# Patient Record
Sex: FEMALE | Race: BLACK OR AFRICAN AMERICAN | NOT HISPANIC OR LATINO | Employment: FULL TIME | URBAN - METROPOLITAN AREA
[De-identification: names, ages, dates, MRNs, and addresses within clinical notes are randomized per-mention and may not be internally consistent; named-entity substitution may affect disease eponyms.]

---

## 2017-02-17 ENCOUNTER — OFFICE VISIT (OUTPATIENT)
Dept: INTERNAL MEDICINE | Facility: CLINIC | Age: 57
End: 2017-02-17
Payer: COMMERCIAL

## 2017-02-17 ENCOUNTER — TELEPHONE (OUTPATIENT)
Dept: INTERNAL MEDICINE | Facility: CLINIC | Age: 57
End: 2017-02-17

## 2017-02-17 VITALS
BODY MASS INDEX: 28.87 KG/M2 | DIASTOLIC BLOOD PRESSURE: 86 MMHG | WEIGHT: 173.31 LBS | HEART RATE: 82 BPM | SYSTOLIC BLOOD PRESSURE: 132 MMHG | HEIGHT: 65 IN

## 2017-02-17 DIAGNOSIS — I10 ESSENTIAL HYPERTENSION: Primary | ICD-10-CM

## 2017-02-17 DIAGNOSIS — N95.1 MENOPAUSE SYNDROME: ICD-10-CM

## 2017-02-17 DIAGNOSIS — Z00.00 ANNUAL PHYSICAL EXAM: Primary | ICD-10-CM

## 2017-02-17 PROCEDURE — 99213 OFFICE O/P EST LOW 20 MIN: CPT | Mod: S$GLB,,, | Performed by: INTERNAL MEDICINE

## 2017-02-17 PROCEDURE — 99999 PR PBB SHADOW E&M-EST. PATIENT-LVL III: CPT | Mod: PBBFAC,,, | Performed by: INTERNAL MEDICINE

## 2017-02-17 NOTE — PROGRESS NOTES
Subjective:       Patient ID: Buffy Morin is a 56 y.o. female.    Chief Complaint: Follow-up    HPI Comments: Follow up    Blood pressure doing very well on 2.5 mg of amlodipine.  No side effects.  Denies edema.  Try to work on exercise, lifestyle modification and healthy eating habits.  Has lost a couple of pounds.    Status post hysterectomy.  Not on ERT.  Some hot flashes and night sweats.  Sister with breast cancer, concerned about this.  Discussed a GYN assessment.    Patient Active Problem List:     Anxiety     Carpal tunnel syndrome     Mild vitamin D deficiency     Mixed hyperlipidemia     Family history of breast cancer in sister     Essential hypertension     Non morbid obesity due to excess calories      Review of Systems   Constitutional: Negative for chills, fatigue and fever.   HENT: Positive for postnasal drip. Negative for congestion and ear pain.    Eyes: Negative.    Respiratory: Negative for cough, chest tightness, shortness of breath and wheezing.    Cardiovascular: Negative.    Gastrointestinal: Negative.    Genitourinary:        Menopause sx       Objective:      Physical Exam   Constitutional: She is oriented to person, place, and time. She appears well-developed and well-nourished.   HENT:   Head: Normocephalic and atraumatic.   Right Ear: External ear normal.   Left Ear: External ear normal.   Mouth/Throat: Oropharynx is clear and moist.   Eyes: Conjunctivae are normal.   Neck: Normal range of motion. Neck supple. No thyromegaly present.   Cardiovascular: Normal rate.    Pulmonary/Chest: No respiratory distress.   Abdominal: Soft.   Musculoskeletal: She exhibits no edema.   Lymphadenopathy:     She has no cervical adenopathy.   Neurological: She is alert and oriented to person, place, and time.   Skin: Skin is warm and dry. No rash noted. No erythema.       Assessment:       1. Essential hypertension    2. Menopause syndrome        Plan:         Essential hypertension: continue current  meds.  Low salt diet, exercise, 5-10-# weight loss.  Call if BP > 135/85 on a regular basis.    Menopause syndrome  -     Ambulatory consult to Gynecology    EPP 9/17

## 2017-03-21 DIAGNOSIS — I10 ESSENTIAL HYPERTENSION: ICD-10-CM

## 2017-03-21 RX ORDER — AMLODIPINE BESYLATE 2.5 MG/1
2.5 TABLET ORAL DAILY
Qty: 90 TABLET | Refills: 0 | Status: SHIPPED | OUTPATIENT
Start: 2017-03-21 | End: 2017-07-28 | Stop reason: SDUPTHER

## 2017-04-17 RX ORDER — MELOXICAM 15 MG/1
TABLET ORAL
Qty: 30 TABLET | Refills: 0 | Status: SHIPPED | OUTPATIENT
Start: 2017-04-17 | End: 2017-05-28 | Stop reason: SDUPTHER

## 2017-05-29 RX ORDER — MELOXICAM 15 MG/1
TABLET ORAL
Qty: 30 TABLET | Refills: 0 | Status: SHIPPED | OUTPATIENT
Start: 2017-05-29 | End: 2017-08-25 | Stop reason: SDUPTHER

## 2017-07-28 DIAGNOSIS — I10 ESSENTIAL HYPERTENSION: ICD-10-CM

## 2017-07-28 RX ORDER — AMLODIPINE BESYLATE 2.5 MG/1
2.5 TABLET ORAL DAILY
Qty: 90 TABLET | Refills: 0 | Status: SHIPPED | OUTPATIENT
Start: 2017-07-28 | End: 2017-09-22 | Stop reason: SDUPTHER

## 2017-08-25 RX ORDER — MELOXICAM 15 MG/1
TABLET ORAL
Qty: 30 TABLET | Refills: 0 | Status: SHIPPED | OUTPATIENT
Start: 2017-08-25 | End: 2018-03-23

## 2017-09-22 ENCOUNTER — TELEPHONE (OUTPATIENT)
Dept: INTERNAL MEDICINE | Facility: CLINIC | Age: 57
End: 2017-09-22

## 2017-09-22 ENCOUNTER — LAB VISIT (OUTPATIENT)
Dept: LAB | Facility: HOSPITAL | Age: 57
End: 2017-09-22
Attending: INTERNAL MEDICINE
Payer: COMMERCIAL

## 2017-09-22 ENCOUNTER — OFFICE VISIT (OUTPATIENT)
Dept: INTERNAL MEDICINE | Facility: CLINIC | Age: 57
End: 2017-09-22
Payer: COMMERCIAL

## 2017-09-22 ENCOUNTER — NURSE TRIAGE (OUTPATIENT)
Dept: ADMINISTRATIVE | Facility: CLINIC | Age: 57
End: 2017-09-22

## 2017-09-22 VITALS
WEIGHT: 166.25 LBS | DIASTOLIC BLOOD PRESSURE: 80 MMHG | BODY MASS INDEX: 28.38 KG/M2 | HEART RATE: 88 BPM | HEIGHT: 64 IN | OXYGEN SATURATION: 99 % | SYSTOLIC BLOOD PRESSURE: 142 MMHG

## 2017-09-22 DIAGNOSIS — Z90.710 S/P HYSTERECTOMY: ICD-10-CM

## 2017-09-22 DIAGNOSIS — R07.89 ATYPICAL CHEST PAIN: ICD-10-CM

## 2017-09-22 DIAGNOSIS — Z12.31 OTHER SCREENING MAMMOGRAM: ICD-10-CM

## 2017-09-22 DIAGNOSIS — R42 LIGHTHEADED: ICD-10-CM

## 2017-09-22 DIAGNOSIS — I10 ESSENTIAL HYPERTENSION: Primary | ICD-10-CM

## 2017-09-22 DIAGNOSIS — E55.9 MILD VITAMIN D DEFICIENCY: ICD-10-CM

## 2017-09-22 DIAGNOSIS — I10 ESSENTIAL HYPERTENSION: ICD-10-CM

## 2017-09-22 LAB
25(OH)D3+25(OH)D2 SERPL-MCNC: 26 NG/ML
ALBUMIN SERPL BCP-MCNC: 4.1 G/DL
ALP SERPL-CCNC: 111 U/L
ALT SERPL W/O P-5'-P-CCNC: 15 U/L
ANION GAP SERPL CALC-SCNC: 9 MMOL/L
AST SERPL-CCNC: 25 U/L
BASOPHILS # BLD AUTO: 0.03 K/UL
BASOPHILS NFR BLD: 0.5 %
BILIRUB SERPL-MCNC: 0.2 MG/DL
BUN SERPL-MCNC: 10 MG/DL
CALCIUM SERPL-MCNC: 10.1 MG/DL
CHLORIDE SERPL-SCNC: 106 MMOL/L
CO2 SERPL-SCNC: 25 MMOL/L
CREAT SERPL-MCNC: 0.9 MG/DL
DIFFERENTIAL METHOD: ABNORMAL
EOSINOPHIL # BLD AUTO: 0.1 K/UL
EOSINOPHIL NFR BLD: 1.8 %
ERYTHROCYTE [DISTWIDTH] IN BLOOD BY AUTOMATED COUNT: 14.7 %
EST. GFR  (AFRICAN AMERICAN): >60 ML/MIN/1.73 M^2
EST. GFR  (NON AFRICAN AMERICAN): >60 ML/MIN/1.73 M^2
GLUCOSE SERPL-MCNC: 94 MG/DL
HCT VFR BLD AUTO: 39.8 %
HGB BLD-MCNC: 13.1 G/DL
LYMPHOCYTES # BLD AUTO: 2.8 K/UL
LYMPHOCYTES NFR BLD: 47 %
MCH RBC QN AUTO: 26.4 PG
MCHC RBC AUTO-ENTMCNC: 32.9 G/DL
MCV RBC AUTO: 80 FL
MONOCYTES # BLD AUTO: 0.2 K/UL
MONOCYTES NFR BLD: 3.8 %
NEUTROPHILS # BLD AUTO: 2.8 K/UL
NEUTROPHILS NFR BLD: 46.9 %
PLATELET # BLD AUTO: 289 K/UL
PMV BLD AUTO: 10.6 FL
POTASSIUM SERPL-SCNC: 4.5 MMOL/L
PROT SERPL-MCNC: 8.6 G/DL
RBC # BLD AUTO: 4.96 M/UL
SODIUM SERPL-SCNC: 140 MMOL/L
TSH SERPL DL<=0.005 MIU/L-ACNC: 1.96 UIU/ML
VIT B12 SERPL-MCNC: 774 PG/ML
WBC # BLD AUTO: 6 K/UL

## 2017-09-22 PROCEDURE — 3008F BODY MASS INDEX DOCD: CPT | Mod: S$GLB,,, | Performed by: INTERNAL MEDICINE

## 2017-09-22 PROCEDURE — 3077F SYST BP >= 140 MM HG: CPT | Mod: S$GLB,,, | Performed by: INTERNAL MEDICINE

## 2017-09-22 PROCEDURE — 99214 OFFICE O/P EST MOD 30 MIN: CPT | Mod: S$GLB,,, | Performed by: INTERNAL MEDICINE

## 2017-09-22 PROCEDURE — 82306 VITAMIN D 25 HYDROXY: CPT

## 2017-09-22 PROCEDURE — 36415 COLL VENOUS BLD VENIPUNCTURE: CPT

## 2017-09-22 PROCEDURE — 85025 COMPLETE CBC W/AUTO DIFF WBC: CPT

## 2017-09-22 PROCEDURE — 99999 PR PBB SHADOW E&M-EST. PATIENT-LVL IV: CPT | Mod: PBBFAC,,, | Performed by: INTERNAL MEDICINE

## 2017-09-22 PROCEDURE — 83036 HEMOGLOBIN GLYCOSYLATED A1C: CPT

## 2017-09-22 PROCEDURE — 82607 VITAMIN B-12: CPT

## 2017-09-22 PROCEDURE — 3079F DIAST BP 80-89 MM HG: CPT | Mod: S$GLB,,, | Performed by: INTERNAL MEDICINE

## 2017-09-22 PROCEDURE — 84443 ASSAY THYROID STIM HORMONE: CPT

## 2017-09-22 PROCEDURE — 80053 COMPREHEN METABOLIC PANEL: CPT

## 2017-09-22 RX ORDER — AMLODIPINE BESYLATE 5 MG/1
5 TABLET ORAL DAILY
Qty: 90 TABLET | Refills: 2 | Status: SHIPPED | OUTPATIENT
Start: 2017-09-22 | End: 2018-06-16 | Stop reason: SDUPTHER

## 2017-09-22 NOTE — TELEPHONE ENCOUNTER
Spoke to tp and notified to keep apt if anyone cancel she will receive a call but as of right nor Dr. Carrasquillo is really booked  Today   Pt voiced understanding

## 2017-09-22 NOTE — TELEPHONE ENCOUNTER
"Call transferred from . Lightheaded. 163/98 HR= ?    Reason for Disposition   BP  >= 180/110    Answer Assessment - Initial Assessment Questions  1. BLOOD PRESSURE: "What is the blood pressure?" "Did you take at least two measurements 5 minutes apart?"     Lightheaded from last fri.   2. ONSET: "When did you take your blood pressure?"    11am   3. HOW: "How did you obtain the blood pressure?" (e.g., visiting nurse, automatic home BP monitor)     Home cuff   4. HISTORY: "Do you have a history of high blood pressure?"     Yes   5. MEDICATIONS: "Are you taking any medications for blood pressure?" "Have you missed any doses recently?"    Missed BP med one day last thurs   6. OTHER SYMPTOMS: "Do you have any symptoms?" (e.g., headache, chest pain, blurred vision, difficulty breathing, weakness)    appt for 4 pm. Denies stroke sx    Protocols used: ST HIGH BLOOD PRESSURE-A-AH  trying to decrease sodium intake since fri. Eating and drinking normally. Urgent message sent to office re above. pt requesting appt moved up today.  Call back with questions.     "

## 2017-09-22 NOTE — PROGRESS NOTES
Subjective:       Patient ID: Buffy Morin is a 56 y.o. female.    Chief Complaint: Follow-up    Follow up HTN    Overall ok but in the past week has felt lightheaded a couple of times.  She may have missed her BP meds once.   Last week when she missed her meds she felt lightheaded, not vertigo.   No syncope or chest pain with this; no pressure, tightness or SOB.  No headache, blurry vision, slurring or weakness.  Last week BP was 161 systolic.    She ws sitting doing nothing when this happened.    Also today felt this as well; today /98 at 10:50 am.  She had taken her meds today at 5 am.  No other sx aside from a little lightheaded. Had been sitting doing nothing.  Ate as usual.  No stress or OTC meds.    For the most part BP has been doing pretty well but she does not check often.    No intercurrent illness. No URI or GI sx.  No fever, chills, sweats or joint sx, no rash.    Recall hysterectomy years ago.    Some DJD and cannot do any high impact exercise.    Patient Active Problem List:     Anxiety     Carpal tunnel syndrome     Mild vitamin D deficiency     Mixed hyperlipidemia     Family history of breast cancer in sister     Essential hypertension                Review of Systems   Constitutional: Negative for activity change, appetite change, chills, fatigue and fever.        Trying to eat a healthy low salt diet; slow deliberate weight loss   HENT: Negative for congestion, hearing loss, sinus pressure and sore throat.    Eyes: Negative for visual disturbance.   Respiratory: Negative for cough, shortness of breath and wheezing.    Cardiovascular: Negative for chest pain, palpitations and leg swelling.        Once or twice has had some vague chest sx, only at rest, never with exertion  No jaw or arm sx  No nausea or diaphoresis  Does exercise on a bike without sx   Gastrointestinal: Negative for abdominal distention, abdominal pain, constipation, diarrhea, nausea and vomiting.   Endocrine: Negative  for cold intolerance, heat intolerance, polydipsia, polyphagia and polyuria.   Genitourinary: Negative for dysuria, frequency, hematuria and vaginal bleeding.   Musculoskeletal: Positive for arthralgias. Negative for gait problem, joint swelling and myalgias.   Skin: Negative for rash.   Neurological: Negative for weakness, light-headedness and headaches.        See above   Hematological: Negative for adenopathy. Does not bruise/bleed easily.   Psychiatric/Behavioral: Negative for confusion, hallucinations, sleep disturbance and suicidal ideas.        Denies stress       Objective:      Physical Exam   Constitutional: She is oriented to person, place, and time. She appears well-developed and well-nourished.   HENT:   Head: Normocephalic and atraumatic.   Right Ear: External ear normal.   Left Ear: External ear normal.   Nose: Nose normal.   Mouth/Throat: Oropharynx is clear and moist. No oropharyngeal exudate.   Eyes: Conjunctivae and EOM are normal. Pupils are equal, round, and reactive to light. No scleral icterus.   Neck: Normal range of motion. Neck supple. No JVD present. No thyromegaly present.   Cardiovascular: Normal rate, regular rhythm, normal heart sounds and intact distal pulses.  Exam reveals no gallop.    No murmur heard.  Pulmonary/Chest: Effort normal and breath sounds normal. No respiratory distress. She has no wheezes.   Abdominal: Soft. Bowel sounds are normal. She exhibits no distension and no mass. There is no tenderness. There is no rebound and no guarding.   Musculoskeletal: Normal range of motion. She exhibits no edema or tenderness.   Lymphadenopathy:     She has no cervical adenopathy.   Neurological: She is alert and oriented to person, place, and time. She displays normal reflexes. No cranial nerve deficit. Coordination normal.   Skin: Skin is warm. No rash noted. No erythema.   Psychiatric: She has a normal mood and affect. Her behavior is normal. Judgment and thought content normal.    Nursing note and vitals reviewed.      Assessment:       1. Essential hypertension    2. Other screening mammogram    3. Mild vitamin D deficiency    4. Lightheaded    5. Atypical chest pain        Plan:         Essential hypertension: I rechecked both arms and got 144/80 range.  Increase amlodipine to 5.  No smartphone se declines digital program, pa.  Low salt diet, exercise, 5-10-# weight loss.  Call if BP > 135/85 on a regular basis.  RTC 6 weeks for BP check and reassessment, sooner with problems  -     CBC auto differential; Future; Expected date: 09/22/2017  -     Comprehensive metabolic panel; Future; Expected date: 09/22/2017  -     Cardiac treadmill stress test; Future  -     amlodipine (NORVASC) 5 MG tablet; Take 1 tablet (5 mg total) by mouth once daily.  Dispense: 90 tablet; Refill: 2  -     Urinalysis; Future; Expected date: 09/22/2017    Other screening mammogram  -     Mammo Digital Screening Bilat with CAD; Future; Expected date: 09/22/2017    Mild vitamin D deficiency  -     Vitamin D; Future; Expected date: 09/22/2017    Lightheaded  -     TSH; Future; Expected date: 09/22/2017  -     Hemoglobin A1c; Future; Expected date: 09/22/2017  -     Vitamin B12; Future; Expected date: 09/22/2017  -     Cardiac treadmill stress test; Future    Atypical chest pain  -     Cardiac treadmill stress test; Future    Flu vaccine through work  Tdap recommended  I will review all studies and determine further tx depending on findings

## 2017-09-22 NOTE — PATIENT INSTRUCTIONS
Taking Your Blood Pressure  Blood pressure is the force of blood against the artery wall as it moves from the heart through the blood vessels. You can take your own blood pressure reading using a digital monitor. Take your readings the same each time, using the same arm. Take readings as often as your healthcare provider instructs.  About blood pressure monitors  Blood pressure monitors are designed for certain ages and cases. You can find monitors for older adults, for pregnant women, and for children. Make sure the one you choose is the right one for your age and situation.  The American Heart Association recommends an automatic cuff monitor that fits on your upper arm (bicep). The cuff should fit your arm size. A cuff thats too large or too small will not give an accurate reading. Measure around your upper arm to find your size.  Monitors that attach to your finger or wrist are not as accurate as monitors for your upper arm.  Ask your healthcare provider for help in choosing a monitor. Bring your monitor to your next provider visit if you need help in using it the correct way.  The steps below are general instructions for using an automatic digital monitor.  Step 1. Relax    · Take your blood pressure at the same time every day, such as in the morning or evening, or at the time your healthcare provider recommends.  · Wait at least a half-hour after smoking, eating, or exercising. Don't drink coffee, tea, soda, or other caffeinated beverages before checking your blood pressure.  · Sit comfortably at a table with both feet on the floor. Do not cross your legs or feet. Place the monitor near you.  · Rest for a few minutes before you begin.  Step 2. Wrap the cuff    · Place your arm on the table, palm up. Your arm should be at the level of your heart. Wrap the cuff around your upper arm, just above your elbow. Its best done on bare skin, not over clothing. Most cuffs will indicate where the brachial artery (the  blood vessel in the middle of the arm at the inner side of the elbow) should line up with the cuff. Look in your monitor's instruction booklet for an illustration. You can also bring your cuff to your healthcare provider and have them show you how to correctly place the cuff.  Step 3. Inflate the cuff    · Push the button that starts the pump.  · The cuff will tighten, then loosen.  · The numbers will change. When they stop changing, your blood pressure reading will appear.  · Take 2 or 3 readings one minute apart.  Step 4. Write down the results of each reading    · Write down your blood pressure numbers for each reading. Note the date and time. Keep your results in one place, such as a notebook. Even if your monitor has a built-in memory, keep a hard copy of the readings.  · Remove the cuff from your arm. Turn off the machine.  · Bring your blood pressure records with your healthcare providers at each visit.  · If you start a new blood pressure medicine, note the day you started the new medicine. Also note the day if you change the dose of your medicine. This information goes on your blood pressure recording sheet. This will help your healthcare provider monitor how well the medicine changes are working.  · Ask your healthcare provider what numbers should prompt you to call him or her. Also ask what numbers should prompt you to get help right away.  Date Last Reviewed: 11/1/2016  © 9770-2719 [employer]. [address] [place] [address], [place] [address]. All rights reserved. This information is not intended as a substitute for professional medical care. Always follow your healthcare professional's instructions.

## 2017-09-22 NOTE — TELEPHONE ENCOUNTER
----- Message from Barbara Dewey sent at 9/22/2017 10:54 AM CDT -----  Contact: self/[phone]  Patient called in regards needing to talk with Dr Carrasquillo about if she can be seen earlier than 4 o'clock, patient stated that her blood pressure is 163/98 she feels light headed (transfered call to a nurse on call). Please call and advise.       Thank you!!!

## 2017-09-23 PROBLEM — R42 LIGHTHEADED: Status: RESOLVED | Noted: 2017-09-22 | Resolved: 2017-09-23

## 2017-09-23 PROBLEM — Z90.710 S/P HYSTERECTOMY: Status: ACTIVE | Noted: 2017-09-23

## 2017-09-23 LAB
ESTIMATED AVG GLUCOSE: 128 MG/DL
HBA1C MFR BLD HPLC: 6.1 %

## 2017-09-25 ENCOUNTER — TELEPHONE (OUTPATIENT)
Dept: INTERNAL MEDICINE | Facility: CLINIC | Age: 57
End: 2017-09-25

## 2017-09-25 NOTE — TELEPHONE ENCOUNTER
----- Message from Cain Lang MA sent at 9/25/2017 10:39 AM CDT -----  Contact: self - [phone]  Patient is requesting to speak with Dr Carrasquillo today regarding her BP readings. Please call. Thanks!     This morning = 166/94 Pulse 86  9/23/17 142/87 Pulse 80   9/24/17 135/82 Pulse 78

## 2017-09-26 NOTE — TELEPHONE ENCOUNTER
----- Message from Jada Milian sent at 9/26/2017  9:19 AM CDT -----  Contact: Patient [phone]  Returned Call    Who left the message: Estefania Saucedo MA     When did the practice call: 09/25/2017 1:06pm    Please advise.    Thank You

## 2017-09-27 ENCOUNTER — HOSPITAL ENCOUNTER (OUTPATIENT)
Dept: RADIOLOGY | Facility: HOSPITAL | Age: 57
Discharge: HOME | End: 2017-09-27
Attending: INTERNAL MEDICINE
Payer: COMMERCIAL

## 2017-09-27 ENCOUNTER — HOSPITAL ENCOUNTER (OUTPATIENT)
Dept: CARDIOLOGY | Facility: CLINIC | Age: 57
Discharge: HOME | End: 2017-09-27
Payer: COMMERCIAL

## 2017-09-27 VITALS — BODY MASS INDEX: 28.34 KG/M2 | HEIGHT: 64 IN | WEIGHT: 166 LBS

## 2017-09-27 DIAGNOSIS — R07.89 ATYPICAL CHEST PAIN: ICD-10-CM

## 2017-09-27 DIAGNOSIS — Z12.31 OTHER SCREENING MAMMOGRAM: ICD-10-CM

## 2017-09-27 DIAGNOSIS — R42 LIGHTHEADED: ICD-10-CM

## 2017-09-27 DIAGNOSIS — I10 ESSENTIAL HYPERTENSION: ICD-10-CM

## 2017-09-27 LAB — DIASTOLIC DYSFUNCTION: NO

## 2017-09-27 PROCEDURE — 77067 SCR MAMMO BI INCL CAD: CPT | Mod: 26,,, | Performed by: RADIOLOGY

## 2017-09-27 PROCEDURE — 77067 SCR MAMMO BI INCL CAD: CPT | Mod: TC

## 2017-09-27 PROCEDURE — 93015 CV STRESS TEST SUPVJ I&R: CPT | Mod: S$GLB,,, | Performed by: INTERNAL MEDICINE

## 2017-09-30 ENCOUNTER — TELEPHONE (OUTPATIENT)
Dept: INTERNAL MEDICINE | Facility: CLINIC | Age: 57
End: 2017-09-30

## 2017-09-30 DIAGNOSIS — E55.9 MILD VITAMIN D DEFICIENCY: Primary | ICD-10-CM

## 2017-09-30 NOTE — TELEPHONE ENCOUNTER
Please let her know stress test looks good    She has low vitamin D.  She should also take vitamin D 2000 IU daily, recheck in 4 months- she should have an EPP with me in Feb 2018    She needs a follow up in 6 weeks with Janae for a BP check- please schedule both thanks

## 2017-10-03 NOTE — TELEPHONE ENCOUNTER
Spoke to pt and advised. Scheduled 6 week f/u appt for 11/14/17 with Janae. Pt placed on recall list for 2/2018 annual.

## 2017-10-24 DIAGNOSIS — I10 ESSENTIAL HYPERTENSION: ICD-10-CM

## 2017-10-24 RX ORDER — AMLODIPINE BESYLATE 2.5 MG/1
2.5 TABLET ORAL DAILY
Qty: 90 TABLET | Refills: 0 | Status: SHIPPED | OUTPATIENT
Start: 2017-10-24 | End: 2018-02-22

## 2017-11-14 ENCOUNTER — OFFICE VISIT (OUTPATIENT)
Dept: INTERNAL MEDICINE | Facility: CLINIC | Age: 57
End: 2017-11-14
Payer: COMMERCIAL

## 2017-11-14 VITALS
HEART RATE: 82 BPM | WEIGHT: 166 LBS | BODY MASS INDEX: 28.34 KG/M2 | DIASTOLIC BLOOD PRESSURE: 86 MMHG | OXYGEN SATURATION: 99 % | SYSTOLIC BLOOD PRESSURE: 138 MMHG | HEIGHT: 64 IN

## 2017-11-14 DIAGNOSIS — I10 ESSENTIAL HYPERTENSION: Primary | ICD-10-CM

## 2017-11-14 PROCEDURE — 99999 PR PBB SHADOW E&M-EST. PATIENT-LVL III: CPT | Mod: PBBFAC,,, | Performed by: HOSPITALIST

## 2017-11-14 PROCEDURE — 99213 OFFICE O/P EST LOW 20 MIN: CPT | Mod: S$GLB,,, | Performed by: HOSPITALIST

## 2017-11-14 NOTE — PROGRESS NOTES
[facility]  CLINIC NOTE    Patient Name: Buffy Morin  YOB: 1960    PRESENTING HISTORY       History of Present Illness:  Ms. Buffy Morin is a 57 y.o. female w/ significant PMHx of HTN presenting for a BP check up. Since her last visit she did not have any lightheadedness or chest pain. Denies having any headache or blurry vision. She has been compliant with her medications and denies having any side effects. Currently doing well and does not have any new complains     Review of Systems:  Constitutional: no fever or chills  Eyes: no visual changes  ENT: no nasal congestion or sore throat  Respiratory: no cough or shortness of breath  Cardiovascular: no chest pain or palpitations  Gastrointestinal: no nausea or vomiting, no abdominal pain or change in bowel habits  Genitourinary: no hematuria or dysuria  Musculoskeletal: no arthralgias or myalgias  Neurological: no seizures or tremors    PAST HISTORY:     Past Medical History:   Diagnosis Date    Abnormal Pap smear of cervix     Anxiety     Carpal tunnel syndrome     Elevated blood pressure     Family history of breast cancer in sister 10/1/2013    S/P hysterectomy 9/23/2017       Past Surgical History:   Procedure Laterality Date    BREAST BIOPSY Right     2007    HYSTERECTOMY      TUBAL LIGATION         Family History   Problem Relation Age of Onset    Cancer Mother      unsure    Hypertension Father     Cancer Sister 60     breast    Thyroid disease Sister     Breast cancer Sister        Social History     Social History    Marital status:      Spouse name: N/A    Number of children: N/A    Years of education: N/A     Social History Main Topics    Smoking status: Never Smoker    Smokeless tobacco: Never Used    Alcohol use Yes      Comment: rare    Drug use: No    Sexual activity: Not Asked     Other Topics Concern    None     Social History Narrative    None       MEDICATIONS &  "ALLERGIES:     Current Outpatient Prescriptions on File Prior to Visit   Medication Sig    amLODIPine (NORVASC) 2.5 MG tablet TAKE 1 TABLET (2.5 MG TOTAL) BY MOUTH ONCE DAILY.    amlodipine (NORVASC) 5 MG tablet Take 1 tablet (5 mg total) by mouth once daily.    cholecalciferol, vitamin D3, (VITAMIN D) 2,000 unit Cap Take 1 capsule by mouth Daily. Take vitamin D 2000 units every day.    meloxicam (MOBIC) 15 MG tablet TAKE 1 TABLET BY MOUTH EVERY DAY    mupirocin (BACTROBAN) 2 % ointment Apply topically 2 (two) times daily.    omega-3 fatty acids-vitamin E (FISH OIL) 1,000 mg Cap Take 1 capsule by mouth Three times a day.     No current facility-administered medications on file prior to visit.        Review of patient's allergies indicates:   Allergen Reactions    No known drug allergies        OBJECTIVE:   Vital Signs:  Vitals:    11/14/17 1520 11/14/17 1535   BP: (!) 134/94 138/86   Pulse: 82    SpO2: 99%    Weight: 75.3 kg (166 lb 0.1 oz)    Height: 5' 4" (1.626 m)        No results found for this or any previous visit (from the past 24 hour(s)).      Physical Exam:   General:  Well developed, well nourished, no acute distress  HEENT:  Normocephalic, atraumatic, PERRL, EOMI, clear sclera, ears normal, throat clear without erythema or exudates  CVS:  RRR, S1 and S2 normal, no murmurs, rubs, gallops  Resp:  Lungs clear to auscultation, no wheezes, rales, rhonchi, cough  GI:  Abdomen soft, non-tender, non-distended, normoactive bowel sounds, no masses  MSK:  No muscle atrophy, cyanosis, peripheral edema, full range of motion  Skin:  No rashes, ulcers, erythema  Neuro:  CNII-XII grossly intact  Psych:  Alert and oriented to person, place, and time    ASSESSMENT & PLAN:     Buffy was seen today for hypertension.    Diagnoses and all orders for this visit:    Essential hypertension  Continue current regiments for meds, diet and exercise  Continue weight loss     Follow up with Dr. Carrasquillo as scheduled     RTC PRN "             Patrick Kumari MD         Internal Medicine PGY-3                  # [phone]

## 2017-11-15 NOTE — PROGRESS NOTES
I have reviewed the notes, assessments, and/or procedures performed by Dr. Kumari, I concur with his documentation of Buffy Morin.

## 2017-11-18 ENCOUNTER — HOSPITAL ENCOUNTER (EMERGENCY)
Facility: HOSPITAL | Age: 57
Discharge: HOME | End: 2017-11-18
Attending: EMERGENCY MEDICINE
Payer: COMMERCIAL

## 2017-11-18 VITALS
RESPIRATION RATE: 20 BRPM | BODY MASS INDEX: 28.17 KG/M2 | HEIGHT: 64 IN | SYSTOLIC BLOOD PRESSURE: 132 MMHG | HEART RATE: 83 BPM | DIASTOLIC BLOOD PRESSURE: 77 MMHG | OXYGEN SATURATION: 98 % | TEMPERATURE: 99 F | WEIGHT: 165 LBS

## 2017-11-18 DIAGNOSIS — I10 ESSENTIAL HYPERTENSION: ICD-10-CM

## 2017-11-18 DIAGNOSIS — J02.0 STREP PHARYNGITIS: Primary | ICD-10-CM

## 2017-11-18 LAB — DEPRECATED S PYO AG THROAT QL EIA: NEGATIVE

## 2017-11-18 PROCEDURE — 96372 THER/PROPH/DIAG INJ SC/IM: CPT

## 2017-11-18 PROCEDURE — 87081 CULTURE SCREEN ONLY: CPT

## 2017-11-18 PROCEDURE — 63600175 PHARM REV CODE 636 W HCPCS: Performed by: EMERGENCY MEDICINE

## 2017-11-18 PROCEDURE — 99284 EMERGENCY DEPT VISIT MOD MDM: CPT | Mod: 25

## 2017-11-18 PROCEDURE — 87880 STREP A ASSAY W/OPTIC: CPT

## 2017-11-18 RX ORDER — KETOROLAC TROMETHAMINE 30 MG/ML
30 INJECTION, SOLUTION INTRAMUSCULAR; INTRAVENOUS
Status: COMPLETED | OUTPATIENT
Start: 2017-11-18 | End: 2017-11-18

## 2017-11-18 RX ORDER — LIDOCAINE HYDROCHLORIDE 20 MG/ML
SOLUTION OROPHARYNGEAL
Qty: 100 ML | Refills: 0 | Status: SHIPPED | OUTPATIENT
Start: 2017-11-18 | End: 2017-11-20

## 2017-11-18 RX ADMIN — PENICILLIN G BENZATHINE 1.2 MILLION UNITS: 1200000 INJECTION, SUSPENSION INTRAMUSCULAR at 08:11

## 2017-11-18 RX ADMIN — KETOROLAC TROMETHAMINE 30 MG: 30 INJECTION, SOLUTION INTRAMUSCULAR at 08:11

## 2017-11-19 NOTE — ED NOTES
Pt reports sore throat that began this morning, pt denies chest pain or sob, pt denies fever, denies n/v/d, pt awake alert in no acute distress, redness and swelling noted to tonsils

## 2017-11-19 NOTE — ED PROVIDER NOTES
"Encounter Date: 11/18/2017       History     Chief Complaint   Patient presents with    Sore Throat     pt to triage ambulatory and reports 1 day of sore throat and painful to swallow and saw white on back of throat; no fever reported     56 y/o F with sore throat since this am.  + pain with swallowing.  No difficulty swallowing.  No fever/chills.  Pt reports "I shined a light back there and I saw the white patches on the back of my throat.  I have had this before and I needed anti-biotics."  Pt has taken halls lozenges, chloraseptic spray, salt water gargle, and tylenol for symptom relief with no improvement.  No relieving factors.  + swollen lymph node x 1 noted by patient.  No cough, sob, wheezing.  No abd pain. No known sick contacts          Review of patient's allergies indicates:   Allergen Reactions    No known drug allergies      Past Medical History:   Diagnosis Date    Abnormal Pap smear of cervix     Anxiety     Carpal tunnel syndrome     Elevated blood pressure     Family history of breast cancer in sister 10/1/2013    Hypertension     S/P hysterectomy 9/23/2017     Past Surgical History:   Procedure Laterality Date    BREAST BIOPSY Right     2007    HYSTERECTOMY      TUBAL LIGATION       Family History   Problem Relation Age of Onset    Cancer Mother      unsure    Hypertension Father     Cancer Sister 60     breast    Thyroid disease Sister     Breast cancer Sister      Social History   Substance Use Topics    Smoking status: Never Smoker    Smokeless tobacco: Never Used    Alcohol use Yes      Comment: rare     Review of Systems   Constitutional: Positive for appetite change. Negative for chills, diaphoresis, fatigue and fever.   HENT: Positive for sore throat and voice change. Negative for congestion.    Eyes: Negative for pain, discharge and redness.   Respiratory: Negative for cough, chest tightness and shortness of breath.    Gastrointestinal: Negative for nausea and vomiting. " "  Endocrine: Negative for polydipsia and polyphagia.   Genitourinary: Negative for difficulty urinating, dysuria and flank pain.   Musculoskeletal: Negative for neck pain and neck stiffness.   Skin: Negative for pallor and rash.   Allergic/Immunologic: Negative for immunocompromised state.   Neurological: Negative for dizziness, weakness and headaches.   Hematological: Negative.    All other systems reviewed and are negative.      Physical Exam     Initial Vitals [11/18/17 1946]   BP Pulse Resp Temp SpO2   (!) 191/101 95 20 98.3 °F (36.8 °C) 95 %      MAP       131         Physical Exam    Nursing note and vitals reviewed.  Constitutional: She appears well-developed and well-nourished. She is not diaphoretic. No distress.   HENT:   Head: Normocephalic and atraumatic.   Mouth/Throat: Uvula is midline and mucous membranes are normal. Oropharyngeal exudate and posterior oropharyngeal erythema present. No posterior oropharyngeal edema.   Eyes: Conjunctivae and EOM are normal.   Neck: Normal range of motion. Neck supple.   Cardiovascular: Normal rate, regular rhythm and normal heart sounds. Exam reveals no gallop and no friction rub.    No murmur heard.  Pulmonary/Chest: Breath sounds normal. No respiratory distress. She has no wheezes. She has no rhonchi. She has no rales.   Abdominal: Soft. She exhibits no distension. There is no tenderness.   Musculoskeletal: Normal range of motion.   Lymphadenopathy:     She has cervical adenopathy.   Neurological: She is alert and oriented to person, place, and time.   Skin: Skin is warm and dry. No rash noted.   Psychiatric: She has a normal mood and affect. Her behavior is normal. Judgment normal.         ED Course   Procedures  Labs Reviewed   THROAT SCREEN, RAPID   CULTURE, STREP A,  THROAT             Medical Decision Making:   Initial Assessment:   58 y/o F with sore throat since this am.  + pain with swallowing.  No difficulty swallowing.  No fever/chills.  Pt reports "I " "shined a light back there and I saw the white patches on the back of my throat.  I have had this before and I needed anti-biotics."  Pt has taken halls lozenges, chloraseptic spray, salt water gargle, and tylenol for symptom relief with no improvement.  No relieving factors.  + swollen lymph node x 1 noted by patient.  No cough, sob, wheezing.  No abd pain. No known sick contacts          Differential Diagnosis:   DDX:  Strep pharyngitis, viral pharyngitis, URI  Clinical Tests:   Lab Tests: Ordered and Reviewed       <> Summary of Lab: Strep neg  ED Management:   Pt treated for strep with bicillin based on exam.  Pt given toradol IM for pain.  I will rx lidocaine gargle for symptomatic tx and have her f/u with pcp for re-evaluation and b/p check.      Pt counseled on their diagnosis and the importance of following up with PCP.  Pt also cautioned on when to return to ED.  Pt verbalizes understanding of discharge plan and will return to ED immediately if symptoms worsen                     ED Course      Clinical Impression:   The primary encounter diagnosis was Strep pharyngitis. A diagnosis of Essential hypertension was also pertinent to this visit.    Disposition:   Disposition: Discharged  Condition: Stable                        Katey Zuluaga MD  11/18/17 2052    "

## 2017-11-20 ENCOUNTER — OFFICE VISIT (OUTPATIENT)
Dept: OTOLARYNGOLOGY | Facility: CLINIC | Age: 57
End: 2017-11-20
Payer: COMMERCIAL

## 2017-11-20 ENCOUNTER — HOSPITAL ENCOUNTER (OUTPATIENT)
Facility: HOSPITAL | Age: 57
Discharge: HOME | End: 2017-11-21
Attending: OTOLARYNGOLOGY | Admitting: OTOLARYNGOLOGY
Payer: COMMERCIAL

## 2017-11-20 ENCOUNTER — OFFICE VISIT (OUTPATIENT)
Dept: INTERNAL MEDICINE | Facility: CLINIC | Age: 57
End: 2017-11-20
Payer: COMMERCIAL

## 2017-11-20 VITALS
WEIGHT: 165.56 LBS | HEART RATE: 116 BPM | DIASTOLIC BLOOD PRESSURE: 78 MMHG | SYSTOLIC BLOOD PRESSURE: 133 MMHG | BODY MASS INDEX: 28.42 KG/M2 | TEMPERATURE: 99 F

## 2017-11-20 VITALS
DIASTOLIC BLOOD PRESSURE: 84 MMHG | BODY MASS INDEX: 28.34 KG/M2 | OXYGEN SATURATION: 99 % | WEIGHT: 166 LBS | SYSTOLIC BLOOD PRESSURE: 134 MMHG | HEART RATE: 107 BPM | HEIGHT: 64 IN

## 2017-11-20 DIAGNOSIS — J02.9 SORE THROAT: ICD-10-CM

## 2017-11-20 DIAGNOSIS — R13.10 PAINFUL SWALLOWING: Primary | ICD-10-CM

## 2017-11-20 DIAGNOSIS — J36 PERITONSILLAR ABSCESS: ICD-10-CM

## 2017-11-20 PROCEDURE — G0379 DIRECT REFER HOSPITAL OBSERV: HCPCS

## 2017-11-20 PROCEDURE — 99999 PR PBB SHADOW E&M-EST. PATIENT-LVL IV: CPT | Mod: PBBFAC,,, | Performed by: INTERNAL MEDICINE

## 2017-11-20 PROCEDURE — 63600175 PHARM REV CODE 636 W HCPCS: Performed by: NURSE PRACTITIONER

## 2017-11-20 PROCEDURE — 99204 OFFICE O/P NEW MOD 45 MIN: CPT | Mod: 25,S$GLB,, | Performed by: OTOLARYNGOLOGY

## 2017-11-20 PROCEDURE — 25000003 PHARM REV CODE 250: Performed by: STUDENT IN AN ORGANIZED HEALTH CARE EDUCATION/TRAINING PROGRAM

## 2017-11-20 PROCEDURE — 99213 OFFICE O/P EST LOW 20 MIN: CPT | Mod: S$GLB,,, | Performed by: INTERNAL MEDICINE

## 2017-11-20 PROCEDURE — G0378 HOSPITAL OBSERVATION PER HR: HCPCS

## 2017-11-20 PROCEDURE — 99999 PR PBB SHADOW E&M-EST. PATIENT-LVL III: CPT | Mod: PBBFAC,,, | Performed by: OTOLARYNGOLOGY

## 2017-11-20 PROCEDURE — 25000003 PHARM REV CODE 250: Performed by: NURSE PRACTITIONER

## 2017-11-20 PROCEDURE — S0077 INJECTION, CLINDAMYCIN PHOSP: HCPCS | Performed by: NURSE PRACTITIONER

## 2017-11-20 PROCEDURE — 63600175 PHARM REV CODE 636 W HCPCS: Performed by: STUDENT IN AN ORGANIZED HEALTH CARE EDUCATION/TRAINING PROGRAM

## 2017-11-20 PROCEDURE — 42700 I&D ABSCESS PERITONSILLAR: CPT | Mod: S$GLB,,, | Performed by: OTOLARYNGOLOGY

## 2017-11-20 RX ORDER — SODIUM CHLORIDE 9 MG/ML
INJECTION, SOLUTION INTRAVENOUS CONTINUOUS
Status: DISCONTINUED | OUTPATIENT
Start: 2017-11-20 | End: 2017-11-21 | Stop reason: HOSPADM

## 2017-11-20 RX ORDER — DEXAMETHASONE SODIUM PHOSPHATE 100 MG/10ML
12 INJECTION INTRAMUSCULAR; INTRAVENOUS EVERY 8 HOURS
Status: CANCELLED | OUTPATIENT
Start: 2017-11-20 | End: 2017-11-21

## 2017-11-20 RX ORDER — DEXAMETHASONE SODIUM PHOSPHATE 4 MG/ML
12 INJECTION, SOLUTION INTRA-ARTICULAR; INTRALESIONAL; INTRAMUSCULAR; INTRAVENOUS; SOFT TISSUE EVERY 8 HOURS
Status: COMPLETED | OUTPATIENT
Start: 2017-11-20 | End: 2017-11-21

## 2017-11-20 RX ORDER — DEXAMETHASONE SODIUM PHOSPHATE 4 MG/ML
4 INJECTION, SOLUTION INTRA-ARTICULAR; INTRALESIONAL; INTRAMUSCULAR; INTRAVENOUS; SOFT TISSUE EVERY 6 HOURS
Status: DISCONTINUED | OUTPATIENT
Start: 2017-11-20 | End: 2017-11-20

## 2017-11-20 RX ORDER — CLINDAMYCIN PHOSPHATE 600 MG/50ML
600 INJECTION, SOLUTION INTRAVENOUS
Status: DISCONTINUED | OUTPATIENT
Start: 2017-11-20 | End: 2017-11-21 | Stop reason: HOSPADM

## 2017-11-20 RX ORDER — HYDROCODONE BITARTRATE AND ACETAMINOPHEN 7.5; 325 MG/15ML; MG/15ML
15 SOLUTION ORAL EVERY 4 HOURS PRN
Status: CANCELLED | OUTPATIENT
Start: 2017-11-20

## 2017-11-20 RX ORDER — DEXTROSE MONOHYDRATE, SODIUM CHLORIDE, AND POTASSIUM CHLORIDE 50; 1.49; 4.5 G/1000ML; G/1000ML; G/1000ML
INJECTION, SOLUTION INTRAVENOUS CONTINUOUS
Status: CANCELLED | OUTPATIENT
Start: 2017-11-20

## 2017-11-20 RX ORDER — CLINDAMYCIN PHOSPHATE 150 MG/ML
600 INJECTION, SOLUTION INTRAVENOUS
Status: DISCONTINUED | OUTPATIENT
Start: 2017-11-20 | End: 2017-11-20

## 2017-11-20 RX ORDER — OXYCODONE AND ACETAMINOPHEN 5; 325 MG/1; MG/1
1 TABLET ORAL EVERY 4 HOURS PRN
Status: DISCONTINUED | OUTPATIENT
Start: 2017-11-20 | End: 2017-11-21 | Stop reason: HOSPADM

## 2017-11-20 RX ORDER — MORPHINE SULFATE 4 MG/ML
4 INJECTION, SOLUTION INTRAMUSCULAR; INTRAVENOUS EVERY 4 HOURS PRN
Status: DISCONTINUED | OUTPATIENT
Start: 2017-11-20 | End: 2017-11-21 | Stop reason: HOSPADM

## 2017-11-20 RX ADMIN — SODIUM CHLORIDE: 0.9 INJECTION, SOLUTION INTRAVENOUS at 01:11

## 2017-11-20 RX ADMIN — MORPHINE SULFATE 4 MG: 4 INJECTION INTRAVENOUS at 01:11

## 2017-11-20 RX ADMIN — CLINDAMYCIN IN 5 PERCENT DEXTROSE 600 MG: 12 INJECTION, SOLUTION INTRAVENOUS at 09:11

## 2017-11-20 RX ADMIN — CLINDAMYCIN IN 5 PERCENT DEXTROSE 600 MG: 12 INJECTION, SOLUTION INTRAVENOUS at 01:11

## 2017-11-20 RX ADMIN — DEXAMETHASONE SODIUM PHOSPHATE 12 MG: 4 INJECTION, SOLUTION INTRAMUSCULAR; INTRAVENOUS at 01:11

## 2017-11-20 RX ADMIN — DEXAMETHASONE SODIUM PHOSPHATE 12 MG: 4 INJECTION, SOLUTION INTRAMUSCULAR; INTRAVENOUS at 09:11

## 2017-11-20 RX ADMIN — MORPHINE SULFATE 4 MG: 4 INJECTION INTRAVENOUS at 05:11

## 2017-11-20 NOTE — LETTER
November 20, 2017      Patrick Gibbs Jr., MD  [address]  [facility] [address]           [facility]  [address]  [facility] [address]  Phone: [phone]  Fax: [phone]          Patient: Buffy Morin   MR Number: [id number]   YOB: 1960   Date of Visit: 11/20/2017       Dear Dr. Patrick Gibbs Jr.:    Thank you for referring Buffy Morin to me for evaluation. Attached you will find relevant portions of my assessment and plan of care.    If you have questions, please do not hesitate to call me. I look forward to following Buffy Morin along with you.    Sincerely,    Champ Ho MD    Enclosure  CC:  No Recipients    If you would like to receive this communication electronically, please contact [email or web address] or [phone] to request more information on [employer] access.    For providers and/or their staff who would like to refer a patient to [facility], please contact us through our one-stop-shop provider referral line, [facility], at [phone].    If you feel you have received this communication in error or would no longer like to receive these types of communications, please e-mail [email or web address]

## 2017-11-20 NOTE — H&P
Chief Complaint   Patient presents with    consult/ severe sore throat, painful swallowing         HPI   57 y.o. female presents from Dr. Gibbs for L throat pain, odynophagia and dysphagia.  She reports that this problem began several days ago.  She was seen in the ED and treated with IM Bicillin with little improvement.  She denies SOB.  She finds it very difficult to drink.      Review of Systems   Constitutional: Negative for fatigue and unexpected weight change.   HENT: Per HPI.  Eyes: Negative for visual disturbance.   Respiratory: Negative for shortness of breath, hemoptysis   Cardiovascular: Negative for chest pain and palpitations.   Musculoskeletal: Negative for decreased ROM, back pain.   Skin: Negative for rash, sunburn, itching.   Neurological: Negative for dizziness and seizures.   Hematological: Negative for adenopathy. Does not bruise/bleed easily.   Endocrine: Negative for rapid weight loss/weight gain, heat/cold intolerance.      Past Medical History       Patient Active Problem List   Diagnosis    Anxiety    Carpal tunnel syndrome    Mild vitamin D deficiency    Mixed hyperlipidemia    Family history of breast cancer in sister    Essential hypertension    S/P hysterectomy    Peritonsillar abscess               Past Surgical History         Past Surgical History:   Procedure Laterality Date    BREAST BIOPSY Right       2007    HYSTERECTOMY        TUBAL LIGATION                Family History          Family History   Problem Relation Age of Onset    Cancer Mother         unsure    Hypertension Father      Cancer Sister 60       breast    Thyroid disease Sister      Breast cancer Sister                 Social History   .  Social History   Social History            Social History    Marital status:        Spouse name: N/A    Number of children: N/A    Years of education: N/A          Occupational History    Not on file.             Social History Main Topics    Smoking  status: Never Smoker    Smokeless tobacco: Never Used    Alcohol use Yes         Comment: rare    Drug use: No    Sexual activity: Not on file           Other Topics Concern    Not on file          Social History Narrative    No narrative on file               Allergies        Review of patient's allergies indicates:   Allergen Reactions    No known drug allergies                 Physical Exam          Vitals:     11/20/17 1058   BP: 133/78   Pulse: (!) 116   Temp: 99.4 °F (37.4 °C)            Body mass index is 28.42 kg/m².        General: AOx3, NAD   Respiratory:  Symmetric chest rise, normal effort  Right Ear: External Auditory Canal WNL,TM w/o masses/lesions/perforations.  Middle ear without evidence of effusion.   Left Ear: External Auditory Canal WNL,TM w/o masses/lesions/perforations.  Middle ear without evidence of effusion.   Nose: No gross nasal septal deviation. Inferior Turbinates WNL bilaterally. No septal perforation. No masses/lesions.   Oral Cavity:  Oral Tongue mobile, no lesions noted. Hard Palate WNL. No buccal or FOM lesions.  Oropharynx:  No masses/lesions of the posterior pharyngeal wall.  L tonsil medialized.  L soft palate edematous.  Pus noted on L tonsil.. Soft palate without masses. Midline uvula.   Neck: No scars.  No cervical lymphadenopathy, thyromegaly or thyroid nodules.  Normal range of motion.    Face: House Brackmann I bilaterally.      Topical and local anesthesia achieved in L tonsillar fossa.  Incision through superolateral L anterior tonsillar pillar carried out.  Abscess cavity bluntly opened with a hemostat to express scant pus.      Assessment/Plan      Problem List Items Addressed This Visit               ENT     Peritonsillar abscess     L PTA.  I and D performed.  Will admit for IVF given limited PO fluid intake, abx, pain control and steroids.

## 2017-11-20 NOTE — PROGRESS NOTES
Chief Complaint   Patient presents with    consult/ severe sore throat, painful swallowing       HPI   57 y.o. female presents from Dr. Gibbs for L throat pain, odynophagia and dysphagia.  She reports that this problem began several days ago.  She was seen in the ED and treated with IM Bicillin with little improvement.  She denies SOB.  She finds it very difficult to drink.     Review of Systems   Constitutional: Negative for fatigue and unexpected weight change.   HENT: Per HPI.  Eyes: Negative for visual disturbance.   Respiratory: Negative for shortness of breath, hemoptysis   Cardiovascular: Negative for chest pain and palpitations.   Musculoskeletal: Negative for decreased ROM, back pain.   Skin: Negative for rash, sunburn, itching.   Neurological: Negative for dizziness and seizures.   Hematological: Negative for adenopathy. Does not bruise/bleed easily.   Endocrine: Negative for rapid weight loss/weight gain, heat/cold intolerance.     Past Medical History   Patient Active Problem List   Diagnosis    Anxiety    Carpal tunnel syndrome    Mild vitamin D deficiency    Mixed hyperlipidemia    Family history of breast cancer in sister    Essential hypertension    S/P hysterectomy    Peritonsillar abscess           Past Surgical History   Past Surgical History:   Procedure Laterality Date    BREAST BIOPSY Right     2007    HYSTERECTOMY      TUBAL LIGATION           Family History   Family History   Problem Relation Age of Onset    Cancer Mother      unsure    Hypertension Father     Cancer Sister 60     breast    Thyroid disease Sister     Breast cancer Sister            Social History   .  Social History     Social History    Marital status:      Spouse name: N/A    Number of children: N/A    Years of education: N/A     Occupational History    Not on file.     Social History Main Topics    Smoking status: Never Smoker    Smokeless tobacco: Never Used    Alcohol use Yes      Comment:  rare    Drug use: No    Sexual activity: Not on file     Other Topics Concern    Not on file     Social History Narrative    No narrative on file         Allergies   Review of patient's allergies indicates:   Allergen Reactions    No known drug allergies            Physical Exam     Vitals:    11/20/17 1058   BP: 133/78   Pulse: (!) 116   Temp: 99.4 °F (37.4 °C)         Body mass index is 28.42 kg/m².      General: AOx3, NAD   Respiratory:  Symmetric chest rise, normal effort  Right Ear: External Auditory Canal WNL,TM w/o masses/lesions/perforations.  Middle ear without evidence of effusion.   Left Ear: External Auditory Canal WNL,TM w/o masses/lesions/perforations.  Middle ear without evidence of effusion.   Nose: No gross nasal septal deviation. Inferior Turbinates WNL bilaterally. No septal perforation. No masses/lesions.   Oral Cavity:  Oral Tongue mobile, no lesions noted. Hard Palate WNL. No buccal or FOM lesions.  Oropharynx:  No masses/lesions of the posterior pharyngeal wall.  L tonsil medialized.  L soft palate edematous.  Pus noted on L tonsil.. Soft palate without masses. Midline uvula.   Neck: No scars.  No cervical lymphadenopathy, thyromegaly or thyroid nodules.  Normal range of motion.    Face: House Brackmann I bilaterally.     Topical and local anesthesia achieved in L tonsillar fossa.  Incision through superolateral L anterior tonsillar pillar carried out.  Abscess cavity bluntly opened with a hemostat to express scant pus.     Assessment/Plan  Problem List Items Addressed This Visit        ENT    Peritonsillar abscess    L PTA.  I and D performed.  Will admit for IVF given limited PO fluid intake, abx, pain control and steroids.

## 2017-11-20 NOTE — PLAN OF CARE
Problem: Patient Care Overview  Goal: Plan of Care Review  Outcome: Ongoing (interventions implemented as appropriate)  Pt remained free from falls/injuries. VSS. C/o pain in throat. Right sided of neck swelling and bloody saliva noted. Relief obtained with prn Morphine. Continued IV fluids as ordered. Pt is in bed with side rails up x 3, wheels locked, bed in lowest position, call light in reach.

## 2017-11-21 VITALS
RESPIRATION RATE: 18 BRPM | SYSTOLIC BLOOD PRESSURE: 125 MMHG | HEIGHT: 64 IN | BODY MASS INDEX: 28.17 KG/M2 | TEMPERATURE: 98 F | DIASTOLIC BLOOD PRESSURE: 69 MMHG | OXYGEN SATURATION: 99 % | HEART RATE: 74 BPM | WEIGHT: 165 LBS

## 2017-11-21 LAB — BACTERIA THROAT CULT: NORMAL

## 2017-11-21 PROCEDURE — 63600175 PHARM REV CODE 636 W HCPCS: Performed by: NURSE PRACTITIONER

## 2017-11-21 PROCEDURE — S0077 INJECTION, CLINDAMYCIN PHOSP: HCPCS | Performed by: NURSE PRACTITIONER

## 2017-11-21 PROCEDURE — 25000003 PHARM REV CODE 250: Performed by: NURSE PRACTITIONER

## 2017-11-21 RX ORDER — HYDROCODONE BITARTRATE AND ACETAMINOPHEN 5; 325 MG/1; MG/1
1 TABLET ORAL EVERY 6 HOURS PRN
Qty: 20 TABLET | Refills: 0 | Status: SHIPPED | OUTPATIENT
Start: 2017-11-21 | End: 2018-02-22

## 2017-11-21 RX ORDER — METHYLPREDNISOLONE 4 MG/1
TABLET ORAL
Qty: 1 PACKAGE | Refills: 0 | Status: SHIPPED | OUTPATIENT
Start: 2017-11-21 | End: 2017-12-12

## 2017-11-21 RX ORDER — CLINDAMYCIN HYDROCHLORIDE 300 MG/1
300 CAPSULE ORAL EVERY 8 HOURS
Qty: 30 CAPSULE | Refills: 0 | Status: SHIPPED | OUTPATIENT
Start: 2017-11-21 | End: 2017-11-21

## 2017-11-21 RX ORDER — CLINDAMYCIN HYDROCHLORIDE 300 MG/1
300 CAPSULE ORAL EVERY 8 HOURS
Qty: 30 CAPSULE | Refills: 0 | Status: SHIPPED | OUTPATIENT
Start: 2017-11-21 | End: 2018-02-22

## 2017-11-21 RX ADMIN — CLINDAMYCIN IN 5 PERCENT DEXTROSE 600 MG: 12 INJECTION, SOLUTION INTRAVENOUS at 06:11

## 2017-11-21 RX ADMIN — DEXAMETHASONE SODIUM PHOSPHATE 12 MG: 4 INJECTION, SOLUTION INTRAMUSCULAR; INTRAVENOUS at 06:11

## 2017-11-21 NOTE — DISCHARGE SUMMARY
[facility]  Discharge Note  Short Stay    Admit Date: 11/20/2017    Discharge Date and Time: 11/21/17    Attending Physician: Champ Ho MD     Discharge Provider: Sammy Strong    Diagnoses:  Active Hospital Problems    Diagnosis  POA    *Peritonsillar abscess [J36]  Yes      Resolved Hospital Problems    Diagnosis Date Resolved POA   No resolved problems to display.       Discharged Condition: good    Hospital Course: Pt was admitted after I&D of left PTA in the clinic. She was started on IV clindamycin and decadron. She did well with PO intake. Her pain was improved. On 11/21/17 she was feeling well and discharged home.    Final Diagnoses: Same as principal problem.    Disposition: Home or Self Care    Follow up/Patient Instructions:    Medications:  Reconciled Home Medications:   Current Discharge Medication List      START taking these medications    Details   clindamycin (CLEOCIN) 300 MG capsule Take 1 capsule (300 mg total) by mouth every 8 (eight) hours.  Qty: 30 capsule, Refills: 0      hydrocodone-acetaminophen 5-325mg (NORCO) 5-325 mg per tablet Take 1 tablet by mouth every 6 (six) hours as needed for Pain.  Qty: 20 tablet, Refills: 0      methylPREDNISolone (MEDROL DOSEPACK) 4 mg tablet use as directed  Qty: 1 Package, Refills: 0         CONTINUE these medications which have NOT CHANGED    Details   !! amLODIPine (NORVASC) 2.5 MG tablet TAKE 1 TABLET (2.5 MG TOTAL) BY MOUTH ONCE DAILY.  Qty: 90 tablet, Refills: 0    Associated Diagnoses: Essential hypertension      !! amlodipine (NORVASC) 5 MG tablet Take 1 tablet (5 mg total) by mouth once daily.  Qty: 90 tablet, Refills: 2    Associated Diagnoses: Essential hypertension      cholecalciferol, vitamin D3, (VITAMIN D) 2,000 unit Cap Take 1 capsule by mouth Daily. Take vitamin D 2000 units every day.      meloxicam (MOBIC) 15 MG tablet TAKE 1 TABLET BY MOUTH EVERY DAY  Qty: 30 tablet, Refills: 0      mupirocin (BACTROBAN) 2 % ointment  Apply topically 2 (two) times daily.  Qty: 30 g, Refills: 0    Associated Diagnoses: Burn       !! - Potential duplicate medications found. Please discuss with provider.          Discharge Procedure Orders  Diet general     Activity as tolerated     Call MD for:  temperature >100.4     Call MD for:  persistent nausea and vomiting or diarrhea     Call MD for:  severe uncontrolled pain     Call MD for:  redness, tenderness, or signs of infection (pain, swelling, redness, odor or green/yellow discharge around incision site)     No dressing needed   Order Comments: Rinse mouth with salt water after meals       Follow-up Information     Denise Botello NP In 2 weeks.    Specialty:  Otolaryngology  Contact information:  [address] [place] [id number] [phone]                   Discharge Procedure Orders (must include Diet, Follow-up, Activity):    Discharge Procedure Orders (must include Diet, Follow-up, Activity)  Diet general     Activity as tolerated     Call MD for:  temperature >100.4     Call MD for:  persistent nausea and vomiting or diarrhea     Call MD for:  severe uncontrolled pain     Call MD for:  redness, tenderness, or signs of infection (pain, swelling, redness, odor or green/yellow discharge around incision site)     No dressing needed   Order Comments: Rinse mouth with salt water after meals

## 2017-11-21 NOTE — ASSESSMENT & PLAN NOTE
58 y/o F with left PTA s/p I&D. Doing well    -encourage PO  -switch to PO clindamycin  -anticipate discharge today

## 2017-11-21 NOTE — PROGRESS NOTES
[facility]  Otorhinolaryngology-Head & Neck Surgery  Progress Note    Subjective:     Post-Op Info:  * No surgery found *      Hospital Day: 2     Interval History: NAEON. Pain much improved. Tolerating PO. No dyspnea or dysphagia     Medications:  Continuous Infusions:   sodium chloride 0.9% 100 mL/hr at 11/20/17 1330     Scheduled Meds:   clindamycin (CLEOCIN) IVPB  600 mg Intravenous Q8H     PRN Meds:morphine, oxyCODONE-acetaminophen     Review of patient's allergies indicates:   Allergen Reactions    No known drug allergies      Objective:     Vital Signs (24h Range):  Temp:  [97.1 °F (36.2 °C)-99.7 °F (37.6 °C)] 98.1 °F (36.7 °C)  Pulse:  [] 66  Resp:  [18] 18  SpO2:  [95 %-100 %] 97 %  BP: (110-136)/(61-84) 120/61        Lines/Drains/Airways     Peripheral Intravenous Line                 Peripheral IV - Single Lumen 11/20/17 1311 Left Antecubital less than 1 day                Physical Exam   Awake, alert, oriented, NAD  Non-labored breathing, no stridor or retractions  OCOP: left anterior pillar erythema present, no purulence noted, edema much improved  Uvula midline,     Significant Labs:  None     Significant Diagnostics:  None    Assessment/Plan:     * Peritonsillar abscess    56 y/o F with left PTA s/p I&D. Doing well    -encourage PO  -switch to PO clindamycin  -anticipate discharge today             Sammy Strong MD  Otorhinolaryngology-Head & Neck Surgery  [facility]

## 2017-11-21 NOTE — SUBJECTIVE & OBJECTIVE
Interval History: NAEON. Pain much improved. Tolerating PO. No dyspnea or dysphagia     Medications:  Continuous Infusions:   sodium chloride 0.9% 100 mL/hr at 11/20/17 1330     Scheduled Meds:   clindamycin (CLEOCIN) IVPB  600 mg Intravenous Q8H     PRN Meds:morphine, oxyCODONE-acetaminophen     Review of patient's allergies indicates:   Allergen Reactions    No known drug allergies      Objective:     Vital Signs (24h Range):  Temp:  [97.1 °F (36.2 °C)-99.7 °F (37.6 °C)] 98.1 °F (36.7 °C)  Pulse:  [] 66  Resp:  [18] 18  SpO2:  [95 %-100 %] 97 %  BP: (110-136)/(61-84) 120/61        Lines/Drains/Airways     Peripheral Intravenous Line                 Peripheral IV - Single Lumen 11/20/17 1311 Left Antecubital less than 1 day                Physical Exam   Awake, alert, oriented, NAD  Non-labored breathing, no stridor or retractions  OCOP: left anterior pillar erythema present, no purulence noted, edema much improved  Uvula midline,     Significant Labs:  None     Significant Diagnostics:  None

## 2017-11-28 ENCOUNTER — TELEPHONE (OUTPATIENT)
Dept: INTERNAL MEDICINE | Facility: CLINIC | Age: 57
End: 2017-11-28

## 2017-11-29 NOTE — TELEPHONE ENCOUNTER
Please call to see how she is doing, she was in the hospital with a tonsillar abscess    OK for EPP with me 2/18 please schedule thanks

## 2017-11-29 NOTE — TELEPHONE ENCOUNTER
Spoke to pt---she advised that she is feeling better but she would give the office a call back because she was at an appt.

## 2017-11-29 NOTE — TELEPHONE ENCOUNTER
----- Message from Minoo Carrasquillo MD sent at 9/30/2017  4:33 PM CDT -----  epp 2/18 Low D  BP Janae?

## 2017-12-18 NOTE — PROGRESS NOTES
The patient usually sees Dr. Carrasquillo.  She comes to see me for painful swallowing.    She was seen recently in the Emergency Room.  She had a negative rapid strep.    She was given a shot of Bicillin and given some lidocaine.  Currently, she has   been having trouble swallowing.  She has been having some pain in the back of   her throat.    PHYSICAL EXAMINATION:  General:  Walks in the room.  She is an ill-looking 57-year-old female.  VITAL SIGNS:  Temperature is 99.1.  HEENT:  Oropharynx, she has some swelling in her left side, tenderness over her   neck is present.  CHEST:  Clear.  CARDIOVASCULAR:  S1, S2.  She is tachycardic, first is 112.  We checked, it is   107.    ASSESSMENT:  I do not know this lady, but she does look acutely ill.  Throat is   not horrible, but I do see some swelling in the tonsillar area.  We are going to   get an ENT visit today.  It does not look like just normal strep throat, now   she had recent negative strep.  We will have her follow with primary care doctor   afterwards.      ASHVIN/IN  dd: 12/18/2017 07:51:01 (CST)  td: 12/19/2017 00:32:19 (CST)  Doc ID   #[id number]  Job ID #[id number]    CC:

## 2018-02-07 ENCOUNTER — TELEPHONE (OUTPATIENT)
Dept: DERMATOLOGY | Facility: CLINIC | Age: 58
End: 2018-02-07

## 2018-02-07 NOTE — TELEPHONE ENCOUNTER
Spoke with patient and message was supposed to go to ENT department.   ----- Message from Ava Urena sent at 2/6/2018  3:25 PM CST -----  Contact: self  Pt stated that she received a call from this office regarding scheduling a follow up to her procedure.  Please return her call.    Pt can be reached at [phone]

## 2018-02-08 ENCOUNTER — OFFICE VISIT (OUTPATIENT)
Dept: OTOLARYNGOLOGY | Facility: CLINIC | Age: 58
End: 2018-02-08
Payer: COMMERCIAL

## 2018-02-08 VITALS
HEART RATE: 110 BPM | TEMPERATURE: 98 F | SYSTOLIC BLOOD PRESSURE: 135 MMHG | WEIGHT: 165.56 LBS | BODY MASS INDEX: 28.42 KG/M2 | DIASTOLIC BLOOD PRESSURE: 86 MMHG

## 2018-02-08 DIAGNOSIS — J06.9 UPPER RESPIRATORY TRACT INFECTION, UNSPECIFIED TYPE: Primary | ICD-10-CM

## 2018-02-08 DIAGNOSIS — H93.19 TINNITUS, UNSPECIFIED LATERALITY: ICD-10-CM

## 2018-02-08 PROCEDURE — 3008F BODY MASS INDEX DOCD: CPT | Mod: S$GLB,,, | Performed by: OTOLARYNGOLOGY

## 2018-02-08 PROCEDURE — 99213 OFFICE O/P EST LOW 20 MIN: CPT | Mod: S$GLB,,, | Performed by: OTOLARYNGOLOGY

## 2018-02-08 PROCEDURE — 99213 OFFICE O/P EST LOW 20 MIN: CPT | Performed by: OTOLARYNGOLOGY

## 2018-02-08 PROCEDURE — 99999 PR PBB SHADOW E&M-EST. PATIENT-LVL III: CPT | Mod: PBBFAC,,, | Performed by: OTOLARYNGOLOGY

## 2018-02-08 NOTE — PROGRESS NOTES
"Chief Complaint   Patient presents with    ringing in ears and sore throat       HPI   57 y.o. female presents with a  1 week history of sore throat and tinnitus.  She also endorses nasal congestion.  She has used OTC meds with little improvement in her symptoms.  She reports that her tinnitus sounds like "crickets" and is worse in quiet environments.  She denies hearing loss.     Review of Systems   Constitutional: Negative for fatigue and unexpected weight change.   HENT: Per HPI.  Eyes: Negative for visual disturbance.   Respiratory: Negative for shortness of breath, hemoptysis   Cardiovascular: Negative for chest pain and palpitations.   Musculoskeletal: Negative for decreased ROM, back pain.   Skin: Negative for rash, sunburn, itching.   Neurological: Negative for dizziness and seizures.   Hematological: Negative for adenopathy. Does not bruise/bleed easily.   Endocrine: Negative for rapid weight loss/weight gain, heat/cold intolerance.     Past Medical History   Patient Active Problem List   Diagnosis    Anxiety    Carpal tunnel syndrome    Mild vitamin D deficiency    Mixed hyperlipidemia    Family history of breast cancer in sister    Essential hypertension    S/P hysterectomy    Peritonsillar abscess           Past Surgical History   Past Surgical History:   Procedure Laterality Date    BREAST BIOPSY Right     2007    HYSTERECTOMY      TUBAL LIGATION           Family History   Family History   Problem Relation Age of Onset    Cancer Mother      unsure    Hypertension Father     Cancer Sister 60     breast    Thyroid disease Sister     Breast cancer Sister            Social History   .  Social History     Social History    Marital status:      Spouse name: N/A    Number of children: N/A    Years of education: N/A     Occupational History    Not on file.     Social History Main Topics    Smoking status: Never Smoker    Smokeless tobacco: Never Used    Alcohol use Yes      " Comment: rare    Drug use: No    Sexual activity: Not on file     Other Topics Concern    Not on file     Social History Narrative    No narrative on file         Allergies   Review of patient's allergies indicates:   Allergen Reactions    No known drug allergies            Physical Exam     Vitals:    02/08/18 1529   BP: 135/86   Pulse: 110   Temp: 97.6 °F (36.4 °C)         Body mass index is 28.42 kg/m².      General: AOx3, NAD   Respiratory:  Symmetric chest rise, normal effort  Right Ear: External Auditory Canal WNL,TM w/o masses/lesions/perforations.  Middle ear without evidence of effusion.   Left Ear: External Auditory Canal WNL,TM w/o masses/lesions/perforations.  Middle ear without evidence of effusion.   Nose: No gross nasal septal deviation. Inferior Turbinates WNL bilaterally. No septal perforation. No masses/lesions.   Oral Cavity:  Oral Tongue mobile, no lesions noted. Hard Palate WNL. No buccal or FOM lesions.  Oropharynx:  No masses/lesions of the posterior pharyngeal wall. Tonsillar fossa without lesions. Soft palate without masses. Midline uvula.   Neck: No scars.  No cervical lymphadenopathy, thyromegaly or thyroid nodules.  Normal range of motion.    Face: House Brackmann I bilaterally.     Assessment/Plan  Problem List Items Addressed This Visit     None      Visit Diagnoses     Upper respiratory tract infection, unspecified type    -  Primary    Tinnitus, unspecified laterality        Viral URI with tinnitus of uncertain etiology.  It seems that her tinnitus began with her URI symptoms.  I see no evidence of bacterial infection.  I offered a Medrol dose pack for symptomatic relief but she declined citing a history of agitation with steroid use.  I reassured her that expect her symptoms to resolve with time.  Should her tinnitus persist, I will arrange for her to see one of our ear specialists.

## 2018-02-19 ENCOUNTER — TELEPHONE (OUTPATIENT)
Dept: INTERNAL MEDICINE | Facility: CLINIC | Age: 58
End: 2018-02-19

## 2018-02-19 NOTE — TELEPHONE ENCOUNTER
Call pt no answer left detail message that Dr. Carrasquillo doesn't have anything today but I will place her on the wait list

## 2018-02-19 NOTE — TELEPHONE ENCOUNTER
----- Message from Sun Rome sent at 2/19/2018 12:12 PM CST -----  Contact: self  Patient states experiencing anxiety  Pt has appointment scheduled for Thursday 2/22/2018   Pt states need appointment today around 3:30pm if possible   Please call pt at [phone]

## 2018-02-20 ENCOUNTER — TELEPHONE (OUTPATIENT)
Dept: OTOLARYNGOLOGY | Facility: CLINIC | Age: 58
End: 2018-02-20

## 2018-02-20 NOTE — TELEPHONE ENCOUNTER
----- Message from Britany Rubio sent at 2/20/2018 10:32 AM CST -----  Contact: pt   Pt is calling to speak with the nurse pt said she can't wait until March to be seen pt is a new pt to ent pt is coming in for ringing in the ears can you please call pt at [phone] jc

## 2018-02-22 ENCOUNTER — OFFICE VISIT (OUTPATIENT)
Dept: INTERNAL MEDICINE | Facility: CLINIC | Age: 58
End: 2018-02-22
Payer: COMMERCIAL

## 2018-02-22 VITALS — BODY MASS INDEX: 28 KG/M2 | WEIGHT: 163.13 LBS | DIASTOLIC BLOOD PRESSURE: 82 MMHG | SYSTOLIC BLOOD PRESSURE: 138 MMHG

## 2018-02-22 DIAGNOSIS — I10 ESSENTIAL HYPERTENSION: ICD-10-CM

## 2018-02-22 DIAGNOSIS — F41.9 ANXIETY: Primary | ICD-10-CM

## 2018-02-22 DIAGNOSIS — E78.2 MIXED HYPERLIPIDEMIA: ICD-10-CM

## 2018-02-22 DIAGNOSIS — F32.9 MAJOR DEPRESSION, CHRONIC: ICD-10-CM

## 2018-02-22 DIAGNOSIS — E55.9 MILD VITAMIN D DEFICIENCY: ICD-10-CM

## 2018-02-22 PROBLEM — J36 PERITONSILLAR ABSCESS: Status: RESOLVED | Noted: 2017-11-20 | Resolved: 2018-02-22

## 2018-02-22 PROCEDURE — 99999 PR PBB SHADOW E&M-EST. PATIENT-LVL III: CPT | Mod: PBBFAC,,, | Performed by: INTERNAL MEDICINE

## 2018-02-22 PROCEDURE — 3008F BODY MASS INDEX DOCD: CPT | Mod: S$GLB,,, | Performed by: INTERNAL MEDICINE

## 2018-02-22 PROCEDURE — 99214 OFFICE O/P EST MOD 30 MIN: CPT | Mod: S$GLB,,, | Performed by: INTERNAL MEDICINE

## 2018-02-22 RX ORDER — LORAZEPAM 0.5 MG/1
0.5 TABLET ORAL DAILY PRN
Qty: 30 TABLET | Refills: 1 | Status: SHIPPED | OUTPATIENT
Start: 2018-02-22 | End: 2021-01-04 | Stop reason: SDUPTHER

## 2018-02-22 RX ORDER — ESCITALOPRAM OXALATE 10 MG/1
10 TABLET ORAL DAILY
Qty: 30 TABLET | Refills: 11 | Status: SHIPPED | OUTPATIENT
Start: 2018-02-22 | End: 2019-01-11 | Stop reason: SDUPTHER

## 2018-02-22 NOTE — PROGRESS NOTES
Subjective:       Patient ID: Buffy Morin is a 57 y.o. female.    Chief Complaint: Anxiety and Depression    Follow up- multiple issues    She is feeling anxious and overwhelmed.  She thinks it started last year when she was on antibiotic treatment, however off meds since November.  Work stress is very significant.  [employer], etc.  Works at [employer], very stressful.  Also may feel overwhelmed at home much of the time.  Sometimes cries.    She does have strong family support although many family members also have medical issues and she is called on to help them.    Some tinnitus.  Plans to see the ear doctor soon.    BP doing well for the most part.   However, she is not exercising very much.  She is hoping to work on a low-salt portion controlled eating plan and regular exercise.    Patient Active Problem List:     Anxiety     Carpal tunnel syndrome     Mild vitamin D deficiency     Mixed hyperlipidemia     Family history of breast cancer in sister     Essential hypertension     S/P hysterectomy        Review of Systems   Constitutional: Positive for fatigue. Negative for activity change, appetite change, chills and fever.   HENT: Positive for tinnitus. Negative for congestion, hearing loss and sore throat.    Eyes: Negative for visual disturbance.   Respiratory: Negative for cough, shortness of breath and wheezing.    Cardiovascular: Negative for chest pain and palpitations.   Gastrointestinal: Negative for abdominal pain, constipation and diarrhea.   Genitourinary: Negative for dysuria, frequency, hematuria and vaginal bleeding.   Musculoskeletal: Negative for gait problem, joint swelling and myalgias.   Skin: Negative for rash.   Neurological: Negative for dizziness, weakness, light-headedness and headaches.   Hematological: Negative for adenopathy. Does not bruise/bleed easily.   Psychiatric/Behavioral: Negative for confusion, hallucinations, sleep disturbance and suicidal ideas. The patient is  nervous/anxious.        Objective:      Physical Exam   Constitutional: She is oriented to person, place, and time. She appears well-developed and well-nourished.   HENT:   Head: Normocephalic and atraumatic.   Right Ear: External ear normal.   Left Ear: External ear normal.   Mouth/Throat: Oropharynx is clear and moist.   Eyes: Conjunctivae are normal.   Neck: Normal range of motion. No thyromegaly present.   Cardiovascular: Normal rate.    Pulmonary/Chest: No respiratory distress.   Abdominal: Soft. Bowel sounds are normal.   Musculoskeletal: She exhibits no edema.   Neurological: She is alert and oriented to person, place, and time.   Skin: Skin is warm and dry. No rash noted. No erythema.   Psychiatric: She has a normal mood and affect. Her behavior is normal. Judgment and thought content normal.   Anxious but appropriate, no SI or HI       Assessment:       1. Anxiety    2. Major depression, chronic    3. Mixed hyperlipidemia    4. Essential hypertension    5. Mild vitamin D deficiency        Plan:         Anxiety: Lifestyle issues reviewed at length, see below.  May use lorazepam very sparingly.  -     TSH; Future; Expected date: 02/22/2018    Major depression, chronic: Able to contract for safety.  No SI or HI.  Sleep hygiene, exercise, avoid caffeine, avoid alcohol.  Prayer and meditation reviewed.  Medications discussed, we'll do a trial of Lexapro, cautions and side effects reviewed.  May use lorazepam very sparingly.  Follow-up in one month.    Mixed hyperlipidemia  -     Lipid panel; Future; Expected date: 02/22/2018    Essential hypertension: Low salt diet, exercise, 5-10-# weight loss.  Call if BP > 135/85 on a regular basis.  -     CBC auto differential; Future; Expected date: 02/22/2018  -     Comprehensive metabolic panel; Future; Expected date: 02/22/2018    Mild vitamin D deficiency  -     Vitamin D; Future; Expected date: 02/22/2018    Other orders  -     escitalopram oxalate (LEXAPRO) 10 MG  tablet; Take 1 tablet (10 mg total) by mouth once daily.  Dispense: 30 tablet; Refill: 11  -     LORazepam (ATIVAN) 0.5 MG tablet; Take 1 tablet (0.5 mg total) by mouth daily as needed for Anxiety (as needed for anxiety no more than 2-3 x weekly).  Dispense: 30 tablet; Refill: 1    I will review all studies and determine further tx depending on findings    Patient counseled for over 50% of her 25 minute appt, all questions answered,  chart reviewed and care coordinated.

## 2018-02-22 NOTE — PATIENT INSTRUCTIONS
Understanding Anxiety Disorders  Almost everyone gets nervous now and then. Its normal to have knots in your stomach before a test, or for your heart to race on a first date. But an anxiety disorder is much more than a case of nerves. In fact, its symptoms may be overwhelming. But treatment can relieve many of these symptoms. Talking to your healthcare provider is the first step.    What are anxiety disorders?  An anxiety disorder causes intense feelings of panic and fear. These feelings may arise for no apparent reason. And they tend to recur again and again. They may prevent you from coping with life and cause you great distress. As a result, you may avoid anything that triggers your fear. In extreme cases, you may never leave the house. Anxiety disorders may cause other symptoms, such as:  · Obsessive thoughts you cant control  · Constant nightmares or painful thoughts of the past  · Nausea, sweating, and muscle tension  · Trouble sleeping or concentrating  What causes anxiety disorders?  Anxiety disorders tend to run in families. For some people, childhood abuse or neglect may play a role. For others, stressful life events or trauma may trigger anxiety disorders. Anxiety can trigger low self-esteem and poor coping skills.  Common anxiety disorders  · Panic disorder. This causes an intense fear of being in danger.  · Phobias. These are extreme fears of certain objects, places, or events.  · Obsessive-compulsive disorder. This causes you to have unwanted thoughts and urges. You also may perform certain actions over and over.  · Posttraumatic stress disorder. This occurs in people who have survived a terrible ordeal. It can cause nightmares and flashbacks about the event.  · Generalized anxiety disorder. This causes constant worry that can greatly disrupt your life.   Getting better  You may believe that nothing can help you. Or, you might fear what others may think. But most anxiety symptoms can be eased.  Having an anxiety disorder is nothing to be ashamed of. Most people do best with treatment that combines medicine and therapy. These arent cures. But they can help you live a healthier life.  Date Last Reviewed: 2/1/2017 © 2000-2017 [employer]. [address] [place] [id number]. All rights reserved. This information is not intended as a substitute for professional medical care. Always follow your healthcare professional's instructions.        Anxiety Reaction  Anxiety is the feeling we all get when we think something bad might happen. It is a normal response to stress and usually causes only a mild reaction. When anxiety becomes more severe, it can interfere with daily life. In some cases, you may not even be aware of what it is youre anxious about. There may also be a genetic link or it may be a learned behavior in the home.  Both psychological and physical triggers cause stress reaction. It's often a response to fear or emotional stress, real or imagined. This stress may come from home, family, work, or social relationships.  During an anxiety reaction, you may feel:  · Helpless  · Nervous  · Depressed  · Irritable  Your body may show signs of anxiety in many ways. You may experience:  · Dry mouth  · Shakiness  · Dizziness  · Weakness  · Trouble breathing  · Breathing fast (hyperventilating)  · Chest pressure  · Sweating  · Headache  · Nausea  · Diarrhea  · Tiredness  · Inability to sleep  · Sexual problems  Home care  · Try to locate the sources of stress in your life. They may not be obvious. These may include:  ¨ Daily hassles of life (traffic jams, missed appointments, car troubles, etc.)  ¨ Major life changes, both good (new baby, job promotion) and bad (loss of job, loss of loved one)  ¨ Overload: feeling that you have too many responsibilities and can't take care of all of them at once  ¨ Feeling helpless, feeling that your problems are beyond what youre able to solve  · Notice  how your body reacts to stress. Learn to listen to your body signals. This will help you take action before the stress becomes severe.  · When you can, do something about the source of your stress. (Avoid hassles, limit the amount of change that happens in your life at one time and take a break when you feel overloaded).  · Unfortunately, many stressful situations can't be avoided. It is necessary to learn how to better manage stress. There are many proven methods that will reduce your anxiety. These include simple things like exercise, good nutrition and adequate rest. Also, there are certain techniques that are helpful:  ¨ Relaxation  ¨ Breathing exercises  ¨ Visualization  ¨ Biofeedback  ¨ Meditation  For more information about this, consult your doctor or go to a local bookstore and review the many books and tapes available on this subject.  Follow-up care  If you feel that your anxiety is not responding to self-help measures, contact your doctor or make an appointment with a counselor. You may need short-term psychological counseling and temporary medicine to help you manage stress.  Call 911  Call your healthcare provider right away if any of these occur:  · Trouble breathing  · Confusion  · Drowsiness or trouble wakening  · Fainting or loss of consciousness  · Rapid heart rate  · Seizure  · New chest pain that becomes more severe, lasts longer, or spreads into your shoulder, arm, neck, jaw, or back  When to seek medical advice  Call your healthcare provider right away if any of these occur:  · Your symptoms get worse  · Severe headache not relieved by rest and mild pain reliever  Date Last Reviewed: 9/29/2015  © 6453-8936 "[employer] [address] [place] [address], [place] [address]. All rights reserved. This information is not intended as a substitute for professional medical care. Always follow your healthcare professional's instructions.        Your Bodys Response to Anxiety    Normal anxiety is  part of the bodys natural defense system. It's an alert to a threat that is unknown, vague, or comes from your own internal fears. While youre in this state, your feelings can range from a vague sense of worry to physical sensations such as a pounding heartbeat. These feelings make you want to react to the threat. An anxiety response is normal in many situations. But when you have an anxiety disorder, the same response can occur at the wrong times.  Anxiety can be helpful  Normal anxiety is a signal from your brain that warns you of a threat and is a normal response to help you prevent something or decrease the bad effects of something you can't control. For example, anxiety is a normal response to situations that might damage your body, separate you from a loved one, or lose your job. The symptoms of anxiety can be physical and mental.  How does it feel?  At certain times, people with anxiety may have:  · Dizziness  · Muscle tension or pain  · Restlessness  · Sleeplessness  · Trouble concentrating  · Racing heartbeat  · Fast breathing  · Shaking or trembling  · Stomachache  · Diarrhea  · Loss of energy  · Sweating  · Cold, clammy hands  · Chest pain  · Dry mouth  Anxiety can also be a problem  Anxiety can become a problem when it is hard to control, occurs for months, and interferes with important parts of your life. With an anxiety disorder, your body has the response described above, but in inappropriate ways. The response a person has depends on the anxiety disorder he or she has. With some disorders, the anxiety is way out of proportion to the threat that triggers it. With others, anxiety may occur even when there isnt a clear threat or trigger.  Who does it affect?  Some people are more prone to persistent anxiety than others. It tends to run in families, and it affects more younger people than older people, and more women than men. But no age, race, or gender is immune to anxiety problems.  Anxiety can be  "treated  The good news is that the anxiety thats disrupting your life can be treated. Check with your healthcare provider and rule out any physical problems that may be causing the anxiety symptoms. If an anxiety disorder is diagnosed seek mental healthcare. This is an illness and it can respond to treatment. Most types of anxiety disorders will respond to "talk therapy" and medicines. Working with your doctor or other healthcare provider, you can develop skills to help you cope with anxiety. You can also gain the perspective you need to overcome your fears. Note: Good sources of support or guidance can be found at your local hospital, mental health clinic, or an employee assistance program.  How to cope with anxiety  If anxiety is wearing you down, here are some things you can do to cope:  · Keep in mind that you cant control everything about a situation. Change what you can and let the rest take its course.  · Exercise--its a great way to relieve tension and help your body feel relaxed.  · Avoid caffeine and nicotine, which can make anxiety symptoms worse.  · Fight the temptation to turn to alcohol or unprescribed drugs for relief. They only make things worse in the long run.  · Educate yourself about anxiety disorders. Keep track of helpful online resources and books you can use during stressful periods.  · Try stress management techniques such as meditation.  · Consider online or in-person support groups.   Date Last Reviewed: 1/1/2017 © 2000-2017 [employer]. [address] [place] [id number]. All rights reserved. This information is not intended as a substitute for professional medical care. Always follow your healthcare professional's instructions.        Depression and the Brains Chemical Balance  Everyone feels sad from time to time. But depression is much more serious than just feeling down. Depression is a real illness, just like diabetes or heart disease. It is believed that a " combination of genetic, biological, environmental, and psychological factors cause depression.  Chemical changes in the brain may contribute to the symptoms of the disease.     In a normal message pattern, messages travel smoothly between nerve cells in the brain.       A change in chemicals in the brain can interfere with how messages are sent. This is one cause of depression.      Brain chemicals and depression  The brain is a complex organ. It controls all the workings of your body, including your emotions. It does this by using messages that travel from one nerve cell to another, and from one brain region to another. Brain messages travel with help from chemicals. These are called neurotransmitters. No one knows exactly what happens in the brain to cause depression. But we do know that neurotransmitters are involved.  Changes in the brain  Two neurotransmitters are the main ones involved in depression. These are norepinephrine and serotonin. Antidepressants and talk therapy (the main treatments for depression) both change the levels of these neurotransmitters. In many cases, this relieves depression symptoms.  Date Last Reviewed: 1/1/2017  © 9627-5032 [employer]. [address] [place] [address]. All rights reserved. This information is not intended as a substitute for professional medical care. Always follow your healthcare professional's instructions.        Treating Anxiety Disorders with Medicine  An anxiety disorder can make you feel nervous or apprehensive, even without a clear reason. In people age 65 and older, generalized anxiety disorder is one of the most commonly diagnosed anxiety disorders. Many times it occurs with depression. Certain anxiety disorders can cause intense feelings of fear or panic. You may even have physical symptoms such as a racing heartbeat, sweating, or dizziness. If you have these feelings, you dont have to suffer anymore. Treatment to help you overcome  your fears will likely include therapy (also called counseling). Medicine may also be prescribed to help control your symptoms.    Medicines  Certain medicines may be prescribed to help control your symptoms. So you may feel less anxious. You may also feel able to move forward with therapy. At first, medicines and dosages may need to be adjusted to find what works best for you. Try to be patient. Tell your healthcare provider how a medicine makes you feel. This way, you can work together to find the treatment thats best for you. Keep in mind that medicines can have side effects. Talk with your provider about any side effects that are bothering you. Changing the dose or type of medicine may help. Dont stop taking medicine on your own. That can cause symptoms to come back.  · Anti-anxiety medicine. This medicine eases symptoms and helps you relax. Your healthcare provider will explain when and how to use it. It may be prescribed for use before situations that make you anxious. You may also be told to take medicine on a regular schedule. Anti-anxiety medicine may make you feel a little sleepy or out of it. Dont drive a car or operate machinery while on this medicine, until you know how it affects you.  Caution  Never use alcohol or other drugs with anti-anxiety medicines. This could result in loss of muscular control, sedation, coma, or death. Also, use only the amount of medicine prescribed for you. If you think you may have taken too much, get emergency care right away.   · Antidepressant medicine. This kind of medicine is often used to treat anxiety, even if you arent depressed. An antidepressant helps balance out brain chemicals. This helps keep anxiety under control. This medicine is taken on a schedule. It takes a few weeks to start working. If you dont notice a change at first, you may just need more time. But if you dont notice results after the first few weeks, tell your provider.  Keep taking  medicines as prescribed  Never change your dosage, share or use another person's medicine, or stop taking your medicines without talking to your healthcare provider first. Keep the following in mind:  · Some medicines must be taken on a schedule. Make this part of your daily routine. For instance, always take your pill before brushing your teeth. A pillbox can help you remember if youve taken your medicine each day.  · Medicines are often taken for 6 to 12 months. Your healthcare provider will then evaluate whether you need to stay on them. Many people who have also had therapy may no longer need medicine to manage anxiety.  · You may need to stop taking medicine slowly to give your body time to adjust. When its time to stop, your healthcare provider will tell you more. Remember: Never stop taking your medicine without talking to your provider first.  · If symptoms return, you may need to start taking medicines again. This isnt your fault. Its just the nature of your anxiety disorder.  Special concerns  · Side effects. Medicines may cause side effects. Ask your healthcare provider or pharmacist what you can expect. They may have ideas for avoiding some side effects.  · Sexual problems. Some antidepressants can affect your desire for sex or your ability to have an orgasm. A change in dosage or medicine often solves the problem. If you have a sexual side effect that concerns you, tell your healthcare provider.  · Addiction. If youve never had a problem with drugs or alcohol, you may not have a problem with medicines used to treat anxiety disorders. But always discuss the medicines with your healthcare provider before taking them. If you have a history of addiction, you may not be able to use certain medicines used to treat anxiety disorders.  · Medicine interactions. Always check with your pharmacist before using any over-the-counter medicines, including herbal supplements.   Date Last Reviewed: 5/1/2017  ©  8966-6299 [employer]. [address] [place] [address], [place] [address]. All rights reserved. This information is not intended as a substitute for professional medical care. Always follow your healthcare professional's instructions.        Depression  Depression is one of the most common mental health problems today. It is not just a state of unhappiness or sadness. It is a true disease. The cause seems to be related to a decrease in chemicals that transmit signals in the brain. Having a family history of depression, alcoholism, or suicide increases the risk. Chronic illness, chronic pain, migraine headaches and high emotional stress also increase the risk.  Depression is something we tend to recognize in others, but may have a hard time seeing in ourselves. It can show in many physical and emotional ways:  · Loss of appetite  · Over-eating  · Not being able to sleep  · Sleeping too much  · Tiredness not related to physical exertion  · Restlessness or irritability  · Slowness of movement or speech  · Feeling depressed or withdrawn  · Loss of interest in things you once enjoyed  · Trouble concentrating, poor memory, trouble making decisions  · Thoughts of harming or killing oneself, or thoughts that life is not worth living  · Low self-esteem  The treatment for depression may include both medicine and psychotherapy. Antidepressants can reduce suffering and can improve the ability to function during the depressed period. Therapy can offer emotional support and help you understand emotional factors that may be causing the depression.  Home care  · On-going care and support helps people manage this disease.  Find a healthcare provider and therapist who meet your needs. Seek help when you feel like you may be getting ill.  · Be kind to yourself. Make it a point to do things that you enjoy (gardening, walking in nature, going to a movie, etc.). Reward yourself for small successes.  · Take care of your physical  body. Eat a balanced diet (low in saturated fat and high in fruits and vegetables). Exercise at least 3 times a week for 30 minutes. Even mild-moderate exercise (like brisk walking) can make you feel better.  · Avoid alcohol, which can make depression worse.  · Take medicine as prescribed.  · Tell each of your healthcare providers about all of the prescription drugs, over-the-counter medicines, vitamins, and supplements you take. Certain supplements interact with medicines and can result in dangerous side effects. Ask your pharmacist when you have questions about drug interactions.  · Talk with your family and trusted friends about your feelings and thoughts. Ask them to help you recognize behavior changes early so you can get help and, if needed, medicine can be adjusted.  Follow-up care  Follow up with your healthcare provider, or as advised.  Call 911  Call 911 if you:  · Have suicidal thoughts, a suicide plan, and the means to carry out the plan  · Have trouble breathing  · Are very confused  · Feel very drowsy or have trouble awakening  · Faint or lose consciousness  · Have new chest pain that becomes more severe, lasts longer, or spreads into your shoulder, arm, neck, jaw or back  When to seek medical advice  Call your healthcare provider right away if any of these occur:  · Feeling extreme depression, fear, anxiety, or anger toward yourself or others  · Feeling out of control  · Feeling that you may try to harm yourself or another  · Hearing voices that others do not hear  · Seeing things that others do not see  · Cant sleep or eat for 3 days in a row  · Friends or family express concern over your behavior and ask you to seek help  Date Last Reviewed: 9/29/2015  © 3261-1552 [employer]. [address] [place] [address], [place] [address]. All rights reserved. This information is not intended as a substitute for professional medical care. Always follow your healthcare professional's  instructions.        Depression: Tips to Help Yourself  As your healthcare providers help treat your depression, you can also help yourself. Keep in mind that your illness affects you emotionally, physically, mentally, and socially. So full recovery will take time. Take care of your body and your soul, and be patient with yourself as you get better.    Self-care  · Educate yourself. Read about treatment and medicine options. If you have the energy, attend local conferences or support groups. Keep a list of useful websites and helpful books and use them as needed. This illness is not your fault. Dont blame yourself for your depression.  · Manage early symptoms. If you notice symptoms returning, experience triggers, or identify other factors that may lead to a depressive episode, get help as soon as possible. Ask trusted friends and family to monitor your behavior and let you know if they see anything of concern.  · Work with your provider. Find a provider you can trust. Communicate honestly with that person and share information on your treatment for depression and your reaction to medicines.  · Be prepared for a crisis. Know what to do if you experience a crisis. Keep the phone number of a crisis hotline and know the location of your community's urgent care centers and the closest emergency department.  · Hold off on big decisions. Depression can cloud your judgment. So wait until you feel better before making major life decisions, such as changing jobs, moving, or getting  or .  · Be patient. Recovering from depression is a process. Dont be discouraged if it takes some time to feel better.  · Keep it simple. Depression saps your energy and concentration. So you wont be able to do all the things you used to do. Set small goals and do what you can.  · Be with others. Dont isolate yourself--youll only feel worse. Try to be with other people. And take part in fun activities when you can. Go to a  movie, ballgame, [employer] service, or social event. Talk openly with people you can trust. And accept help when its offered.  Take care of your body  People with depression often lose the desire to take care of themselves. That only makes their problems worse. During treatment and afterward, make a point to:  · Exercise. Its a great way to take care of your body. And studies have shown that exercise helps fight depression.  · Avoid drugs and alcohol. These may ease the pain in the short term. But theyll only make your problems worse in the long run.  · Get relief from stress. Ask your healthcare provider for relaxation exercises and techniques to help relieve stress.  · Eat right. A balanced and healthy diet helps keep your body healthy.  Date Last Reviewed: 1/1/2017  © 4291-9825 [employer]. [address] [place] [address], [place] [address]. All rights reserved. This information is not intended as a substitute for professional medical care. Always follow your healthcare professional's instructions.

## 2018-02-26 ENCOUNTER — OFFICE VISIT (OUTPATIENT)
Dept: OTOLARYNGOLOGY | Facility: CLINIC | Age: 58
End: 2018-02-26
Payer: COMMERCIAL

## 2018-02-26 ENCOUNTER — CLINICAL SUPPORT (OUTPATIENT)
Dept: AUDIOLOGY | Facility: CLINIC | Age: 58
End: 2018-02-26
Payer: COMMERCIAL

## 2018-02-26 ENCOUNTER — LAB VISIT (OUTPATIENT)
Dept: LAB | Facility: HOSPITAL | Age: 58
End: 2018-02-26
Attending: INTERNAL MEDICINE
Payer: COMMERCIAL

## 2018-02-26 VITALS — SYSTOLIC BLOOD PRESSURE: 137 MMHG | HEART RATE: 86 BPM | DIASTOLIC BLOOD PRESSURE: 82 MMHG

## 2018-02-26 DIAGNOSIS — E55.9 MILD VITAMIN D DEFICIENCY: ICD-10-CM

## 2018-02-26 DIAGNOSIS — H90.5 HIGH FREQUENCY SENSORINEURAL HEARING LOSS OF RIGHT EAR: ICD-10-CM

## 2018-02-26 DIAGNOSIS — Z77.122 HISTORY OF EXPOSURE TO NOISE: ICD-10-CM

## 2018-02-26 DIAGNOSIS — Z83.52: ICD-10-CM

## 2018-02-26 DIAGNOSIS — H93.13 TINNITUS, BILATERAL: Primary | ICD-10-CM

## 2018-02-26 DIAGNOSIS — E78.2 MIXED HYPERLIPIDEMIA: ICD-10-CM

## 2018-02-26 DIAGNOSIS — F41.9 ANXIETY: ICD-10-CM

## 2018-02-26 DIAGNOSIS — Z86.59 HISTORY OF ANXIETY: ICD-10-CM

## 2018-02-26 DIAGNOSIS — I10 ESSENTIAL HYPERTENSION: ICD-10-CM

## 2018-02-26 LAB
25(OH)D3+25(OH)D2 SERPL-MCNC: 43 NG/ML
ALBUMIN SERPL BCP-MCNC: 3.9 G/DL
ALP SERPL-CCNC: 111 U/L
ALT SERPL W/O P-5'-P-CCNC: 15 U/L
ANION GAP SERPL CALC-SCNC: 7 MMOL/L
AST SERPL-CCNC: 22 U/L
BASOPHILS # BLD AUTO: 0.04 K/UL
BASOPHILS NFR BLD: 0.9 %
BILIRUB SERPL-MCNC: 0.4 MG/DL
BUN SERPL-MCNC: 11 MG/DL
CALCIUM SERPL-MCNC: 9.7 MG/DL
CHLORIDE SERPL-SCNC: 105 MMOL/L
CHOLEST SERPL-MCNC: 233 MG/DL
CHOLEST/HDLC SERPL: 3.6 {RATIO}
CO2 SERPL-SCNC: 27 MMOL/L
CREAT SERPL-MCNC: 0.8 MG/DL
DIFFERENTIAL METHOD: ABNORMAL
EOSINOPHIL # BLD AUTO: 0 K/UL
EOSINOPHIL NFR BLD: 0.9 %
ERYTHROCYTE [DISTWIDTH] IN BLOOD BY AUTOMATED COUNT: 14 %
EST. GFR  (AFRICAN AMERICAN): >60 ML/MIN/1.73 M^2
EST. GFR  (NON AFRICAN AMERICAN): >60 ML/MIN/1.73 M^2
GLUCOSE SERPL-MCNC: 107 MG/DL
HCT VFR BLD AUTO: 40.2 %
HDLC SERPL-MCNC: 64 MG/DL
HDLC SERPL: 27.5 %
HGB BLD-MCNC: 12.9 G/DL
IMM GRANULOCYTES # BLD AUTO: 0 K/UL
IMM GRANULOCYTES NFR BLD AUTO: 0 %
LDLC SERPL CALC-MCNC: 152.8 MG/DL
LYMPHOCYTES # BLD AUTO: 2 K/UL
LYMPHOCYTES NFR BLD: 43.3 %
MCH RBC QN AUTO: 26.6 PG
MCHC RBC AUTO-ENTMCNC: 32.1 G/DL
MCV RBC AUTO: 83 FL
MONOCYTES # BLD AUTO: 0.3 K/UL
MONOCYTES NFR BLD: 5.5 %
NEUTROPHILS # BLD AUTO: 2.3 K/UL
NEUTROPHILS NFR BLD: 49.4 %
NONHDLC SERPL-MCNC: 169 MG/DL
NRBC BLD-RTO: 0 /100 WBC
PLATELET # BLD AUTO: 290 K/UL
PMV BLD AUTO: 10.2 FL
POTASSIUM SERPL-SCNC: 4.7 MMOL/L
PROT SERPL-MCNC: 8.2 G/DL
RBC # BLD AUTO: 4.85 M/UL
SODIUM SERPL-SCNC: 139 MMOL/L
TRIGL SERPL-MCNC: 81 MG/DL
TSH SERPL DL<=0.005 MIU/L-ACNC: 1.43 UIU/ML
WBC # BLD AUTO: 4.55 K/UL

## 2018-02-26 PROCEDURE — 92557 COMPREHENSIVE HEARING TEST: CPT | Mod: S$GLB,,, | Performed by: AUDIOLOGIST

## 2018-02-26 PROCEDURE — 80061 LIPID PANEL: CPT

## 2018-02-26 PROCEDURE — 3008F BODY MASS INDEX DOCD: CPT | Mod: S$GLB,,, | Performed by: OTOLARYNGOLOGY

## 2018-02-26 PROCEDURE — 80053 COMPREHEN METABOLIC PANEL: CPT

## 2018-02-26 PROCEDURE — 99999 PR PBB SHADOW E&M-EST. PATIENT-LVL III: CPT | Mod: PBBFAC,,, | Performed by: OTOLARYNGOLOGY

## 2018-02-26 PROCEDURE — 99213 OFFICE O/P EST LOW 20 MIN: CPT | Mod: S$GLB,,, | Performed by: OTOLARYNGOLOGY

## 2018-02-26 PROCEDURE — 85025 COMPLETE CBC W/AUTO DIFF WBC: CPT

## 2018-02-26 PROCEDURE — 36415 COLL VENOUS BLD VENIPUNCTURE: CPT

## 2018-02-26 PROCEDURE — 82306 VITAMIN D 25 HYDROXY: CPT

## 2018-02-26 PROCEDURE — 84443 ASSAY THYROID STIM HORMONE: CPT

## 2018-02-26 PROCEDURE — 92550 TYMPANOMETRY & REFLEX THRESH: CPT | Mod: S$GLB,,, | Performed by: AUDIOLOGIST

## 2018-02-26 NOTE — PATIENT INSTRUCTIONS
Audiometry reviewed  Tinnitus literature provided; Rx options briefly discussed including bedside masking and use of AD hearing aid  Lipoflavanoid use briefly discussed; avoid caffeine, ASA use  AD hearing protection at office encouraged  RTC prn

## 2018-02-26 NOTE — PROGRESS NOTES
"Subjective:       Patient ID: Buffy Morin is a 57 y.o. female.    Chief Complaint: Tinnitus    HPI: Ms. Morin is a 57-year-old -American female was followed by Dr. Minoo Carrasquillo for anxiety and depression. Ms. Morin admits to to a difficult past several weeks with life and work concerns.  Her throat feels better at this point; she recently spent the night the hospital for infection, she tells me.  I colleague Dr. Champ Ho examined her 2/8/18 for one week history of sore throat and tinnitus as well as nasal congestion symptoms.  She describes a tinnitus like "crickets" , worse in quiet environments.  She leaves the television on at night as a masking device  She was last examined by Dr. Carrasquillo 2/22/18.  She was diagnosed with major depression, chronic, anxiety, mixed hyperlipidemia, essential hypertension mild vitamin D deficiency.  Her chief complaint today is "ringing in ears".  She told the audiologist who performed audiometric testing today that she was initiating medication for anxiety( this past Thursday) ; she had  experienced sensitivity to loud sounds. She indicated bilateral tinnitus that started about 3 weeks ago.  She indicated that her sister had tinnitus and anxiety as well.   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  She has tried utilizing earplugs at work.  She tends to leave the television or at night as a masking device to suppress the tinnitus.    She denies a significant history of head trauma.  She is a right-handed individual.  She denies a history of significant ear infections or previous history of ear surgery.     PMH: High blood pressure  Past Surgical History:   Procedure Laterality Date    BREAST BIOPSY Right     2007    HYSTERECTOMY      TUBAL LIGATION      Family history: Heart disease, high blood pressure, high cholesterol, stroke, mental illness  ALLERGIES,: None " known  Habits: One cup of coffee per day    Review of Systems   Ears: Positive for ringing in ear and family history of hearing loss.    Nose:  Positive for snoring.    Mouth/Throat: Positive for pain swallowing, impaired swallowing and hoarse voice.   Cardiovascular:  Positive for history of high blood pressure.   Other:  Negative for rash.         The patient completed an audiometric study today performed by the [facility] audiology service.  The study is duplicated below the results reviewed with the patient in detail  Objective:         Blood pressure 137/82 pulse 86 height 5 feet 4 inches weight 163 pounds  Gen.: Alert and oriented lady in no acute distress  Physical Exam   Constitutional: She is oriented to person, place, and time. She appears well-developed and well-nourished.   HENT:   Head: Normocephalic.   Right Ear: Tympanic membrane and external ear normal. No drainage. No foreign bodies. No mastoid tenderness. Tympanic membrane is not perforated. No decreased hearing is noted.   Left Ear: Tympanic membrane and external ear normal. No drainage. No foreign bodies. No mastoid tenderness. Tympanic membrane is not perforated. No decreased hearing is noted.   Ears:    Nose: Nose normal. No nasal deformity, septal deviation or nasal septal hematoma. No epistaxis. Right sinus exhibits no maxillary sinus tenderness and no frontal sinus tenderness. Left sinus exhibits no maxillary sinus tenderness and no frontal sinus tenderness.   Mouth/Throat: Uvula is midline, oropharynx is clear and moist and mucous membranes are normal. No oral lesions. No trismus in the jaw. No uvula swelling. No oropharyngeal exudate or tonsillar abscesses.       Neck: Neck supple. No tracheal deviation present. No thyromegaly present.   Pulmonary/Chest: Effort normal. No stridor.   Lymphadenopathy:     She has no cervical adenopathy.   Neurological: She is alert and oriented to person, place, and time.   Skin: No rash  noted.       Assessment:       1. Tinnitus, bilateral    2. High frequency sensorineural hearing loss of right ear    3. History of anxiety    4. Family history of tinnitus    5. History of exposure to noise        Plan:     Audiometry reviewed  Tinnitus literature provided; Rx options briefly discussed including bedside masking and use of AD hearing aid  Lipoflavanoid use briefly discussed; avoid caffeine, ASA use  AD hearing protection at office encouraged  RTC prn

## 2018-02-28 ENCOUNTER — TELEPHONE (OUTPATIENT)
Dept: INTERNAL MEDICINE | Facility: CLINIC | Age: 58
End: 2018-02-28

## 2018-02-28 DIAGNOSIS — E78.2 MIXED HYPERLIPIDEMIA: Primary | ICD-10-CM

## 2018-02-28 NOTE — TELEPHONE ENCOUNTER
Please let her know that labs are acceptable other than that her cholesterol is very high.  It is recommended that she be on cholesterol medication.  I would start her on Lipitor 20 mg and recheck labs in 3 months.  We would monitor her liver very carefully.    Alternatively, we can recheck labs in about 3-4 months time and see me afterwards to discuss in more detail.  Please let me know.  Thank you

## 2018-03-01 ENCOUNTER — NURSE TRIAGE (OUTPATIENT)
Dept: ADMINISTRATIVE | Facility: CLINIC | Age: 58
End: 2018-03-01

## 2018-03-01 ENCOUNTER — OFFICE VISIT (OUTPATIENT)
Dept: INTERNAL MEDICINE | Facility: CLINIC | Age: 58
End: 2018-03-01
Payer: COMMERCIAL

## 2018-03-01 VITALS
HEART RATE: 71 BPM | HEIGHT: 64 IN | WEIGHT: 163.56 LBS | BODY MASS INDEX: 27.92 KG/M2 | DIASTOLIC BLOOD PRESSURE: 82 MMHG | SYSTOLIC BLOOD PRESSURE: 134 MMHG

## 2018-03-01 DIAGNOSIS — E78.2 MIXED HYPERLIPIDEMIA: ICD-10-CM

## 2018-03-01 DIAGNOSIS — F41.9 ANXIETY: ICD-10-CM

## 2018-03-01 DIAGNOSIS — R55 PRE-SYNCOPE: Primary | ICD-10-CM

## 2018-03-01 DIAGNOSIS — I10 ESSENTIAL HYPERTENSION: ICD-10-CM

## 2018-03-01 PROCEDURE — 3079F DIAST BP 80-89 MM HG: CPT | Mod: S$GLB,,, | Performed by: PHYSICIAN ASSISTANT

## 2018-03-01 PROCEDURE — 99214 OFFICE O/P EST MOD 30 MIN: CPT | Mod: S$GLB,,, | Performed by: PHYSICIAN ASSISTANT

## 2018-03-01 PROCEDURE — 99999 PR PBB SHADOW E&M-EST. PATIENT-LVL III: CPT | Mod: PBBFAC,,, | Performed by: PHYSICIAN ASSISTANT

## 2018-03-01 PROCEDURE — 3075F SYST BP GE 130 - 139MM HG: CPT | Mod: S$GLB,,, | Performed by: PHYSICIAN ASSISTANT

## 2018-03-01 PROCEDURE — 93005 ELECTROCARDIOGRAM TRACING: CPT | Mod: S$GLB,,, | Performed by: PHYSICIAN ASSISTANT

## 2018-03-01 PROCEDURE — 93010 ELECTROCARDIOGRAM REPORT: CPT | Mod: S$GLB,,, | Performed by: INTERNAL MEDICINE

## 2018-03-01 NOTE — PROGRESS NOTES
Subjective:       Patient ID: Buffy Morin is a 57 y.o. female.        Chief Complaint: Fatigue    Buffy Morin is an established patient of Minoo Carrasquillo MD here today for urgent care visit.    Anxiety: started Lexapro 1 week ago. Has Ativan to use prn.  She notes that upon starting the Lexapro she got really thirsty and didn't want to eat but that is improving.      HTN: taking amlodipine 5 mg daily.    This morning she got up and went to the rest room.  She was sitting on the toilet straining to have a bowel movement and felt hot, lightheaded, sweaty, pre-syncopal.  She passed the bowel movement and continued to sit down for a few minutes and felt much better. She notes she ate a [employer] spicy chicken sandwich the day before and felt very hot and sweaty when she ate it.  She got up and checked her BP, which was 102/66, which is lower than her normal pressure readings.  Her pulse was 75.  She really feels completely fine now.  She did not have any chest pain, shortness of breath, headache, blurred vision.  She notes she has not been drinking as much water or eating as regularly the past couple of days.  She had a stress test 9/2017.  She had lab work 3 days ago that was acceptable except elevated cholesterol.           Review of Systems   Constitutional: Positive for fatigue. Negative for chills, diaphoresis and fever.   HENT: Negative for congestion and sore throat.    Eyes: Negative for visual disturbance.   Respiratory: Negative for cough, chest tightness and shortness of breath.    Cardiovascular: Negative for chest pain, palpitations and leg swelling.   Gastrointestinal: Negative for abdominal pain, blood in stool, constipation, diarrhea, nausea and vomiting.   Genitourinary: Negative for dysuria, frequency, hematuria and urgency.   Musculoskeletal: Negative for arthralgias and back pain.   Skin: Negative for rash.   Neurological: Positive for syncope (pre-syncope). Negative for dizziness, weakness  and headaches.   Psychiatric/Behavioral: Negative for dysphoric mood and sleep disturbance. The patient is not nervous/anxious.        Objective:      Physical Exam   Constitutional: She is oriented to person, place, and time. She appears well-developed and well-nourished. No distress.   HENT:   Head: Normocephalic and atraumatic.   Right Ear: Tympanic membrane and external ear normal.   Left Ear: Tympanic membrane and external ear normal.   Nose: Nose normal.   Mouth/Throat: Oropharynx is clear and moist.   Eyes: Conjunctivae are normal. Pupils are equal, round, and reactive to light.   Cardiovascular: Normal rate, regular rhythm and normal heart sounds.  Exam reveals no gallop.    No murmur heard.  Pulmonary/Chest: Effort normal and breath sounds normal. No respiratory distress.   Abdominal: Soft. Normal appearance. There is no tenderness. There is no rebound, no guarding and no CVA tenderness.   Musculoskeletal: She exhibits no edema.   Neurological: She is alert and oriented to person, place, and time. She has normal strength. No cranial nerve deficit or sensory deficit. She displays a negative Romberg sign.   Normal finger to thumb opposition, rapid hand movements, finger to nose   Skin: Skin is warm and dry. She is not diaphoretic.   Psychiatric: She has a normal mood and affect.   Nursing note and vitals reviewed.      Assessment:       1. Pre-syncope    2. Anxiety    3. Essential hypertension    4. Mixed hyperlipidemia        Plan:       Buffy was seen today for fatigue.    Diagnoses and all orders for this visit:    Pre-syncope  -     EKG 12-lead  -     ED precautions discussed.  She feels completely fine at this time.  Discussed that this was most likely a vasovagal reaction.  Importance of drinking plenty of fluids and eating regular meals discussed.  She should rest today.      Anxiety        -     She is tolerating Lexapro so far.  She has had some mild nausea and a bit of fatigue but feels this is  "already improving.  She takes the medication at night now, which has helped.    Essential hypertension        -     Well controlled.    Hyperlipidemia        -     Discussed recommendation for statin therapy.  At this time, she declines any cholesterol lowering medication.  She has recently started Lexapro and does not want to start another new medication at this time.  Low cholesterol diet discussed today.  She agrees to repeat levels in 3-4 months with PCP and proceed from there.    Pt has been given instructions populated from [employer] database and has verbalized understanding of the after visit summary and information contained wherein.    Follow up with a primary care provider. May go to ER for acute shortness of breath, lightheadedness, fever, or any other emergent complaints or changes in condition.    "This note will be shared with the patient"    No future appointments.            "

## 2018-03-01 NOTE — TELEPHONE ENCOUNTER
----- Message from Tatianna Nguyen sent at 3/1/2018  8:18 AM CST -----  Contact: Pt [phone]  Pt would like a call back from the nurse regarding something that have happened. Pt would like to speak in detail with the nurse.

## 2018-03-01 NOTE — TELEPHONE ENCOUNTER
Reason for Disposition   Taking a medicine that could cause weakness (e.g., blood pressure medications, diuretics)    Protocols used: ST WEAKNESS (GENERALIZED) AND FATIGUE-A-OH    Pt states earlier this AM she was in restroom while having a BM and started feeling hot, sweating, weak and feeling bad. Pt states she feels much better at this time. Care advice given, appt made.

## 2018-03-01 NOTE — PATIENT INSTRUCTIONS
"  Near-Fainting: Vagal Reaction  Fainting (syncope) is a temporary loss of consciousness (passing out). It is associated with a loss of postural tone. Postural tone is the constant contraction of the muscles in your body to help keep your body upright. It also helps blood return towards the heart and brain. Syncope occurs when there is reduced blood flow to the brain due to this common vagal reaction. A vagal reaction is a reflex response that causes a sudden drop in your blood pressure, and your pulse to slow down. If the pulse is low enough, the blood pressure falls and causes fainting or near-fainting. Lying down usually stops the reaction very quickly.  These are symptoms of near-fainting:  · Feeling lightheaded or like you are going to faint  · Weak pulse  · Nausea  · Sweating  · Blurred vision or feeling like your vision is "blacking out"  · Palpitations  · Chest pain  · Trouble breathing  · Cool and clammy skin  Causes for near-fainting include:  · Sudden emotional stress like fear, pain, panic, sight of blood  · Straining or overexertion, straining while using the toilet, coughing, sneezing  · Standing up too quickly, or standing up for too long a time  · Pregnancy  Home care  The following will help you care for yourself at home:  · Rest today and go back to your normal activities as soon as you are feeling back to normal.  · If you become light-headed or dizzy, lie down right away or sit with your head lowered between your knees.  · Stay hydrated and do not skip meals.  · Avoid prolonged standing and hot places  · Do what you can to prevent constipation. If you bear down excessively when trying to have a bowel movement, this can trigger a vagal response  There may be other causes for a vagal response and near-syncope. For example, this can happen after open-heart surgery when the heart muscle is inflamed and irritated.  Check with your doctor to see if there is testing you need such as a tilt-table test, " heart rhythm monitoring, or blood tests. Review the medicines you take with your healthcare provider and pharmacist to be sure the symptoms you have are not a side effect of a medicine.  Follow-up care  Follow up with your healthcare provider, or as advised.   If you are having frequent episodes of near-syncope or vagal reactions, be cautious about activities such as driving that could harm yourself or others if you were to faint. Do not drive or operate heavy machinery if you are feeling like you may faint.  Call 911  Call 911 if any of these occur:  · Another fainting spell occurs, and it is not explained by the common causes listed above  · Fainting or loss of consciousness  · Chest, arm, neck, jaw, back, or abdominal pain  · Shortness of breath  · Weakness, tingling, or numbness in one side of the face, one arm or leg  · Slurred speech, confusion, trouble walking or seeing  · Seizure  · Blood in vomit or stools (black or red color)  When to seek medical advice  Call your healthcare provider right away if you have occasional mild lightheadedness, especially when standing up.  Date Last Reviewed: 6/1/2016  © 1915-1184 [employer]. [address] [place] [address], [place] [address]. All rights reserved. This information is not intended as a substitute for professional medical care. Always follow your healthcare professional's instructions.

## 2018-03-01 NOTE — TELEPHONE ENCOUNTER
See note from Deneen lo for cholesterol    Repeat labs in 4 months- orders in please schedule and let me know thanks

## 2018-03-01 NOTE — TELEPHONE ENCOUNTER
----- Message from Sunil Kay sent at 3/1/2018  8:39 AM CST -----  Contact: self/[phone]  Pt is returning a call from someone in the office. Please advise.      Thank You

## 2018-03-02 ENCOUNTER — TELEPHONE (OUTPATIENT)
Dept: INTERNAL MEDICINE | Facility: CLINIC | Age: 58
End: 2018-03-02

## 2018-03-02 NOTE — TELEPHONE ENCOUNTER
Declines meds for cholesterol     Repeat labs in 4 months- orders in please schedule and let me know thanks

## 2018-03-05 ENCOUNTER — TELEPHONE (OUTPATIENT)
Dept: INTERNAL MEDICINE | Facility: CLINIC | Age: 58
End: 2018-03-05

## 2018-03-05 DIAGNOSIS — R94.31 ABNORMAL EKG: ICD-10-CM

## 2018-03-05 DIAGNOSIS — R55 PRE-SYNCOPE: Primary | ICD-10-CM

## 2018-03-05 NOTE — TELEPHONE ENCOUNTER
Called and spoke with patient.  Discussed EKG.  She felt completely fine over the weekend.  Will refer to cardiology.  She can do appointments after 2:30 in the afternoon as she starts work at 6 AM.      Only able to schedule a 1 PM appointment.  Called and discussed with patient.  She can go to appointment with Dr. Connell tomorrow, 3/6 at 1 PM.

## 2018-03-06 ENCOUNTER — OFFICE VISIT (OUTPATIENT)
Dept: CARDIOLOGY | Facility: CLINIC | Age: 58
End: 2018-03-06
Payer: COMMERCIAL

## 2018-03-06 VITALS
SYSTOLIC BLOOD PRESSURE: 148 MMHG | HEIGHT: 64 IN | WEIGHT: 162.5 LBS | BODY MASS INDEX: 27.74 KG/M2 | HEART RATE: 89 BPM | DIASTOLIC BLOOD PRESSURE: 69 MMHG

## 2018-03-06 DIAGNOSIS — I10 ESSENTIAL HYPERTENSION: Primary | ICD-10-CM

## 2018-03-06 DIAGNOSIS — E78.2 MIXED HYPERLIPIDEMIA: ICD-10-CM

## 2018-03-06 DIAGNOSIS — R94.31 ABNORMAL ECG: ICD-10-CM

## 2018-03-06 PROCEDURE — 99243 OFF/OP CNSLTJ NEW/EST LOW 30: CPT | Mod: S$GLB,,, | Performed by: INTERNAL MEDICINE

## 2018-03-06 PROCEDURE — 99999 PR PBB SHADOW E&M-EST. PATIENT-LVL III: CPT | Mod: PBBFAC,,, | Performed by: INTERNAL MEDICINE

## 2018-03-06 NOTE — PATIENT INSTRUCTIONS
Eating Heart-Healthy Food: Using the DASH Plan    Eating for your heart doesnt have to be hard or boring. You just need to know how to make healthier choices. The DASH eating plan has been developed to help you do just that. DASH stands for Dietary Approaches to Stop Hypertension. It is a plan that has been proven to be healthier for your heart and to lower your risk for high blood pressure. It can also help lower your risk for cancer, heart disease, osteoporosis, and diabetes.  Choosing from each food group  Choose foods from each of the food groups below each day. Try to get the recommended number of servings for each food group. The serving numbers are based on a diet of 2,000 calories a day. Talk to your doctor if youre unsure about your calorie needs. Along with getting the correct servings, the DASH plan also recommends a sodium intake less than 2,300 mg per day.        Grains  Servings: 6 to 8 a day  A serving is:  · 1 slice bread  · 1 ounce dry cereal  · Half a cup cooked rice, pasta or cereal  Best choices: Whole grains and any grains high in fiber. Vegetables  Servings: 4 to 5 a day  A serving is:  · 1 cup raw leafy vegetable  · Half a cup cut-up raw or cooked vegetable  · Half a cup vegetable juice  Best choices: Fresh or frozen vegetables prepared without added salt or fat.   Fruits  Servings: 4 to 5 a day  A serving is:  · 1 medium fruit  · One-quarter cup dried fruit  · Half a cup fresh, frozen, or canned fruit  · Half a cup of 100% fruit juices  Best choices: A variety of fresh fruits of different colors. Whole fruits are a better choice than fruit juices. Low-fat or fat-free dairy  Servings: 2 to 3 a day  A serving is:  · 1 cup milk  · 1 cup yogurt  · One and a half ounces cheese  Best choices: Skim or 1% milk, low-fat or fat-free yogurt or buttermilk, and low-fat cheeses.         Lean meats, poultry, fish  Servings: 6 or fewer a day  A serving is:  · 1 ounce cooked meats, poultry, or fish  · 1  egg  Best choices: Lean poultry and fish. Trim away visible fat. Broil, grill, roast, or boil instead of frying. Remove skin from poultry before eating. Limit how much red meat you eat.  Nuts, seeds, beans  Servings: 4 to 5 a week  A serving is:  · One-third cup nuts (one and a half ounces)  · 2 tablespoons nut butter or seeds  · Half a cup cooked dry beans or legumes  Best choices: Dry roasted nuts with no salt added, lentils, kidney beans, garbanzo beans, and whole cuenca beans.   Fats and oils  Servings: 2 to 3 a day  A serving is:  · 1 teaspoon vegetable oil  · 1 teaspoon soft margarine  · 1 tablespoon mayonnaise  · 2 tablespoons salad dressing  Best choices: Nut and vegetable oils (nontropical vegetable oils), such as olive and canola oil. Sweets  Servings: 5 a week or fewer  A serving is:  · 1 tablespoon sugar, maple syrup, or honey  · 1 tablespoon jam or jelly  · 1 half-ounce jelly beans (about 15)  · 1 cup lemonade  Best choices: Dried fruit can be a satisfying sweet. Choose low-fat sweets. And watch your serving sizes!      For more on the DASH eating plan, visit:  www.nhlbi.nih.gov/health/health-topics/topics/dash   Date Last Reviewed: 6/1/2016  © 3513-2629 [employer]. [address] [place] [address], [place] [address]. All rights reserved. This information is not intended as a substitute for professional medical care. Always follow your healthcare professional's instructions.      We discussed that 150 minutes a week of moderate intensity exercise is recommended to improve cardiovascular health.

## 2018-03-06 NOTE — PROGRESS NOTES
"Subjective:   Patient ID:  Buffy Morin is a 57 y.o. female who presents for evaluation of Pre Syncope and Abnormal ECG      HPI: She is referred by Ms Rodrigues for "abnormal ECG." She was seen last week for a presyncopal event after having a bowel movement. An ECG was done which showed NSR with some non-specific ST abnormalities in the anterior leads. This was unchanged from a prior ECG done in 2017 when she had a normal exercise treadmill test. Buffy Morin denies any chest pain, shortness of breath, PND, orthopnea, palpitations or leg edema. She has not had any more pre-syncopal events. She rides her bike three times a week and would like to change her diet to lower her cholesterol.      Past Medical History:   Diagnosis Date    Abnormal Pap smear of cervix     Anxiety     Carpal tunnel syndrome     Elevated blood pressure     Family history of breast cancer in sister 10/1/2013    Hypertension     S/P hysterectomy 9/23/2017       Past Surgical History:   Procedure Laterality Date    BREAST BIOPSY Right     2007    HYSTERECTOMY      TUBAL LIGATION         Social History     Social History    Marital status:      Spouse name: N/A    Number of children: N/A    Years of education: N/A     Social History Main Topics    Smoking status: Never Smoker    Smokeless tobacco: Never Used    Alcohol use Yes      Comment: rare    Drug use: No    Sexual activity: Not Asked     Other Topics Concern    None     Social History Narrative    None       Family History   Problem Relation Age of Onset    Cancer Mother      unsure    Hypertension Mother     Hypertension Father     Cancer Sister 60     breast    Thyroid disease Sister     Breast cancer Sister     Depression Sister        Patient's Medications   New Prescriptions    No medications on file   Previous Medications    AMLODIPINE (NORVASC) 5 MG TABLET    Take 1 tablet (5 mg total) by mouth once daily.    CHOLECALCIFEROL, VITAMIN D3, " (VITAMIN D) 2,000 UNIT CAP    Take 1 capsule by mouth Daily. Take vitamin D 2000 units every day.    ESCITALOPRAM OXALATE (LEXAPRO) 10 MG TABLET    Take 1 tablet (10 mg total) by mouth once daily.    LORAZEPAM (ATIVAN) 0.5 MG TABLET    Take 1 tablet (0.5 mg total) by mouth daily as needed for Anxiety (as needed for anxiety no more than 2-3 x weekly).    MELOXICAM (MOBIC) 15 MG TABLET    TAKE 1 TABLET BY MOUTH EVERY DAY   Modified Medications    No medications on file   Discontinued Medications    MUPIROCIN (BACTROBAN) 2 % OINTMENT    Apply topically 2 (two) times daily.       Review of Systems   Constitution: Negative for weakness, malaise/fatigue and weight gain.   HENT: Negative for hearing loss.    Eyes: Negative for visual disturbance.   Cardiovascular: Negative for chest pain, claudication, dyspnea on exertion, leg swelling, near-syncope, orthopnea, palpitations, paroxysmal nocturnal dyspnea and syncope.   Respiratory: Negative for cough, shortness of breath, sleep disturbances due to breathing, snoring and wheezing.    Endocrine: Negative for cold intolerance, heat intolerance, polydipsia, polyphagia and polyuria.   Hematologic/Lymphatic: Negative for bleeding problem. Does not bruise/bleed easily.   Skin: Negative for rash and suspicious lesions.   Musculoskeletal: Negative for arthritis, falls, joint pain, muscle weakness and myalgias.   Gastrointestinal: Negative for abdominal pain, change in bowel habit, constipation, diarrhea, heartburn, hematochezia, melena and nausea.   Genitourinary: Negative for hematuria and nocturia.   Neurological: Negative for excessive daytime sleepiness, dizziness, headaches, light-headedness and loss of balance.   Psychiatric/Behavioral: Negative for depression. The patient is not nervous/anxious.    Allergic/Immunologic: Negative for environmental allergies.       BP (!) 148/69 (BP Location: Left arm, Patient Position: Sitting, BP Method: Large (Automatic))   Pulse 89   Ht  "5' 4" (1.626 m)   Wt 73.7 kg (162 lb 7.7 oz)   BMI 27.89 kg/m²     Objective:   Physical Exam   Constitutional: She is oriented to person, place, and time. She appears well-developed and well-nourished.        HENT:   Head: Normocephalic and atraumatic.   Mouth/Throat: Oropharynx is clear and moist.   Eyes: Conjunctivae and EOM are normal. Pupils are equal, round, and reactive to light. No scleral icterus.   Neck: Normal range of motion. Neck supple. No hepatojugular reflux and no JVD present. No tracheal deviation present. No thyromegaly present.   Cardiovascular: Normal rate, regular rhythm, normal heart sounds and intact distal pulses.  PMI is not displaced.    Pulses:       Carotid pulses are 2+ on the right side, and 2+ on the left side.       Radial pulses are 2+ on the right side, and 2+ on the left side.        Dorsalis pedis pulses are 2+ on the right side, and 2+ on the left side.        Posterior tibial pulses are 2+ on the right side, and 2+ on the left side.   Pulmonary/Chest: Effort normal and breath sounds normal.   Abdominal: Soft. Bowel sounds are normal. She exhibits no distension and no mass. There is no hepatosplenomegaly. There is no tenderness.   Musculoskeletal: She exhibits no edema or tenderness.   Lymphadenopathy:     She has no cervical adenopathy.   Neurological: She is alert and oriented to person, place, and time.   Skin: Skin is warm and dry. No rash noted. No cyanosis or erythema. Nails show no clubbing.   Psychiatric: She has a normal mood and affect. Her speech is normal and behavior is normal.       Lab Results   Component Value Date     02/26/2018    K 4.7 02/26/2018     02/26/2018    CO2 27 02/26/2018    BUN 11 02/26/2018    CREATININE 0.8 02/26/2018     02/26/2018    HGBA1C 6.1 (H) 09/22/2017    AST 22 02/26/2018    ALT 15 02/26/2018    ALBUMIN 3.9 02/26/2018    PROT 8.2 02/26/2018    BILITOT 0.4 02/26/2018    WBC 4.55 02/26/2018    HGB 12.9 02/26/2018    " HCT 40.2 02/26/2018    MCV 83 02/26/2018     02/26/2018    TSH 1.425 02/26/2018    CHOL 233 (H) 02/26/2018    HDL 64 02/26/2018    LDLCALC 152.8 02/26/2018    TRIG 81 02/26/2018       Assessment:     1. Essential hypertension : Her blood pressure was elevated today. We discussed limiting sodium intake to < 1500mg daily and following the DASH diet plan.   2. Mixed hyperlipidemia : Ten year CV risk is 10.1%. She would like to avoid taking a statin. Information given on the Mediterranean Diet. We discussed that 150 minutes a week of moderate intensity exercise is recommended to improve cardiovascular health.   3. Abnormal ECG : She has non-specific findings which are unchanged from prior ECG. Normal ETT last summer.       Plan:     Buffy was seen today for pre syncope and abnormal ecg.    Diagnoses and all orders for this visit:    Essential hypertension    Mixed hyperlipidemia    Abnormal ECG        Thank you for allowing me to participate in this patient's care. Please do not hesitate to contact me with any questions or concerns.

## 2018-03-23 ENCOUNTER — OFFICE VISIT (OUTPATIENT)
Dept: ORTHOPEDICS | Facility: CLINIC | Age: 58
End: 2018-03-23
Payer: COMMERCIAL

## 2018-03-23 ENCOUNTER — TELEPHONE (OUTPATIENT)
Dept: INTERNAL MEDICINE | Facility: CLINIC | Age: 58
End: 2018-03-23

## 2018-03-23 ENCOUNTER — HOSPITAL ENCOUNTER (OUTPATIENT)
Dept: RADIOLOGY | Facility: HOSPITAL | Age: 58
Discharge: HOME | End: 2018-03-23
Attending: PHYSICIAN ASSISTANT
Payer: COMMERCIAL

## 2018-03-23 VITALS — BODY MASS INDEX: 27.74 KG/M2 | WEIGHT: 162.5 LBS | HEIGHT: 64 IN

## 2018-03-23 DIAGNOSIS — M17.0 PRIMARY OSTEOARTHRITIS OF BOTH KNEES: Primary | ICD-10-CM

## 2018-03-23 DIAGNOSIS — M25.562 LEFT KNEE PAIN, UNSPECIFIED CHRONICITY: ICD-10-CM

## 2018-03-23 DIAGNOSIS — M25.562 LEFT KNEE PAIN, UNSPECIFIED CHRONICITY: Primary | ICD-10-CM

## 2018-03-23 PROCEDURE — 73562 X-RAY EXAM OF KNEE 3: CPT | Mod: 26,LT,, | Performed by: RADIOLOGY

## 2018-03-23 PROCEDURE — 99999 PR PBB SHADOW E&M-EST. PATIENT-LVL II: CPT | Mod: PBBFAC,,, | Performed by: PHYSICIAN ASSISTANT

## 2018-03-23 PROCEDURE — 73560 X-RAY EXAM OF KNEE 1 OR 2: CPT | Mod: TC,RT

## 2018-03-23 PROCEDURE — 73560 X-RAY EXAM OF KNEE 1 OR 2: CPT | Mod: 26,RT,, | Performed by: RADIOLOGY

## 2018-03-23 PROCEDURE — 99213 OFFICE O/P EST LOW 20 MIN: CPT | Mod: S$GLB,,, | Performed by: PHYSICIAN ASSISTANT

## 2018-03-23 RX ORDER — HYALURONATE SODIUM 20 MG/2 ML
40 SYRINGE (ML) INTRAARTICULAR WEEKLY
Status: COMPLETED | OUTPATIENT
Start: 2018-03-23 | End: 2018-04-11

## 2018-03-23 RX ORDER — MELOXICAM 15 MG/1
15 TABLET ORAL DAILY
Qty: 30 TABLET | Refills: 1 | Status: SHIPPED | OUTPATIENT
Start: 2018-03-23 | End: 2018-07-14 | Stop reason: SDUPTHER

## 2018-03-23 NOTE — TELEPHONE ENCOUNTER
I spoke to her, she is doing well on the Lexapro.    Mood is stable.  She did have an episode of presyncope but that has resolved and no further issues.  I recommended a follow-up with me in the next 3-4 months, sooner with problems in the interim.

## 2018-03-23 NOTE — PROGRESS NOTES
SUBJECTIVE:     Chief Complaint & History of Present Illness:  Buffy Morin is a Established patient 57 y.o. female who is seen here today with a complaint of  bilateral knee pain .  She's here today for continuing care and treatment of pain in bilateral knees left more so than right.  By her primary care proximal me year ago and has been treating conservatively.  With over-the-counter NSAIDs rest and activity modifications.  Beginning to have a progressively worsening pain in the left knee more so than right with intermittent episodes of swelling associated with periods of prolonged standing as well as activity  On a scale of 1-10, with 10 being worst pain imaginable, he rates this pain as 2 on good days and 6 on bad days.  she describes the pain as sore and aching.    Review of patient's allergies indicates:   Allergen Reactions    No known drug allergies          Current Outpatient Prescriptions   Medication Sig Dispense Refill    amlodipine (NORVASC) 5 MG tablet Take 1 tablet (5 mg total) by mouth once daily. 90 tablet 2    cholecalciferol, vitamin D3, (VITAMIN D) 2,000 unit Cap Take 1 capsule by mouth Daily. Take vitamin D 2000 units every day.      escitalopram oxalate (LEXAPRO) 10 MG tablet Take 1 tablet (10 mg total) by mouth once daily. 30 tablet 11    LORazepam (ATIVAN) 0.5 MG tablet Take 1 tablet (0.5 mg total) by mouth daily as needed for Anxiety (as needed for anxiety no more than 2-3 x weekly). 30 tablet 1    meloxicam (MOBIC) 15 MG tablet Take 1 tablet (15 mg total) by mouth once daily. 30 tablet 1     Current Facility-Administered Medications   Medication Dose Route Frequency Provider Last Rate Last Dose    EUFLEXXA 10 mg/mL(mw 2.4 -3.6 million) injection Syrg 40 mg  40 mg Intra-articular Weekly Rodrigo Sampson PA-C           Past Medical History:   Diagnosis Date    Abnormal Pap smear of cervix     Anxiety     Carpal tunnel syndrome     Elevated blood pressure     Family history  "of breast cancer in sister 10/1/2013    Hypertension     S/P hysterectomy 9/23/2017       Past Surgical History:   Procedure Laterality Date    BREAST BIOPSY Right     2007    HYSTERECTOMY      TUBAL LIGATION         Vital Signs (Most Recent)  There were no vitals filed for this visit.        Review of Systems:  ROS:  Constitutional: no fever or chills  Eyes: no visual changes  ENT: no nasal congestion or sore throat  Respiratory: no cough or shortness of breath  Cardiovascular: no chest pain or palpitations  Gastrointestinal: no nausea or vomiting, tolerating diet  Genitourinary: no hematuria or dysuria  Integument/Breast: no rash or pruritis  Hematologic/Lymphatic: no easy bruising or lymphadenopathy  Musculoskeletal: no arthralgias or myalgias  Neurological: no seizures or tremors  Behavioral/Psych: no auditory or visual hallucinations, Positive for anxiety  Endocrine: no heat or cold intolerance                OBJECTIVE:     PHYSICAL EXAM:  Height: 5' 4" (162.6 cm) Weight: 73.7 kg (162 lb 7.7 oz), General Appearance: Well nourished, well developed, in no acute distress.  Neurological: Mood & affect are normal.  left  Knee Exam:  Knee Range of Motion:0-120 degrees flexion   Effusion: Mild  Condition of skin:intact  Location of tenderness:Medial joint line   Strength:5 of 5  Stability:  Lachman: stable, LCL: stable, MCL: stable, PCL: stable and posteromedial (dial): stable  Varus /Valgus stress:  normal  Vida:   negative/negative    right  Knee Exam:  Knee Range of Motion:0-120 degrees flexion   Effusion:none  Condition of skin:intact  Location of tenderness:Medial joint line   Strength:5 of 5  Stability:  Lachman: stable, LCL: stable, MCL: stable, PCL: stable and posteromedial (dial): stable  Varus /Valgus stress:  normal  Vida:   negative/negative      Hip Examination:  full painless range of motion, without tenderness    RADIOGRAPHS:  X-rays taken today films viewed by me demonstrate no evidence of " fracture dislocation in her about the knees mild osteoarthritis noted bilaterally left more so than right with medial joint space narrowing.  Mild sclerotic changes the medial compartments no evidence of osteophytic spurring    ASSESSMENT/PLAN:     Plan: We discussed with the patient at length all the different treatment options available for  the knee including anti-inflammatories, acetaminophen, rest, ice, knee strengthening exercise, occasional cortisone injections for temporary relief, Viscosupplimentation injections, arthroscopic debridement osteotomy, and finally knee arthroplasty.   Meloxicam 15 mg daily with food for pain control  Euflexxa for bilateral knees  Follow up  1 week to begin injections

## 2018-03-28 ENCOUNTER — OFFICE VISIT (OUTPATIENT)
Dept: ORTHOPEDICS | Facility: CLINIC | Age: 58
End: 2018-03-28
Payer: COMMERCIAL

## 2018-03-28 VITALS
WEIGHT: 162.5 LBS | HEIGHT: 64 IN | SYSTOLIC BLOOD PRESSURE: 143 MMHG | BODY MASS INDEX: 27.74 KG/M2 | DIASTOLIC BLOOD PRESSURE: 86 MMHG | HEART RATE: 86 BPM

## 2018-03-28 DIAGNOSIS — M17.0 PRIMARY OSTEOARTHRITIS OF BOTH KNEES: ICD-10-CM

## 2018-03-28 PROCEDURE — 20610 DRAIN/INJ JOINT/BURSA W/O US: CPT | Mod: 50,S$GLB,, | Performed by: NURSE PRACTITIONER

## 2018-03-28 PROCEDURE — 99499 UNLISTED E&M SERVICE: CPT | Mod: S$GLB,,, | Performed by: NURSE PRACTITIONER

## 2018-03-28 PROCEDURE — 99999 PR PBB SHADOW E&M-EST. PATIENT-LVL III: CPT | Mod: PBBFAC,,, | Performed by: NURSE PRACTITIONER

## 2018-03-28 RX ADMIN — Medication 40 MG: at 03:03

## 2018-03-28 NOTE — LETTER
March 31, 2018      Rodrigo Sampson PA-C  [address]  [place] [address]           [facility]  [address]  [place] [address]  Phone: [phone]          Patient: Buffy Morin   MR Number: [id number]   YOB: 1960   Date of Visit: 3/28/2018       Dear Rodrigo Sampson:    Thank you for referring Buffy Morin to me for evaluation. Attached you will find relevant portions of my assessment and plan of care.    If you have questions, please do not hesitate to call me. I look forward to following Buffy Morin along with you.    Sincerely,    Skye Boss NP    Enclosure  CC:  No Recipients    If you would like to receive this communication electronically, please contact [email or web address] or [phone] to request more information on [facility] access.    For providers and/or their staff who would like to refer a patient to [facility], please contact us through our one-stop-shop provider referral line, Atul Sweet, at [phone].    If you feel you have received this communication in error or would no longer like to receive these types of communications, please e-mail [email or web address]

## 2018-04-01 NOTE — PROGRESS NOTES
Buffy Morin presents to clinic today for the first bilateral knee Euflexxa injection.    Exam demonstrates the no effusion in the  bilateral knee, and the skin is intact.    Diagnosis: osteoarthritis knee    After time out was performed and patient ID, side, and site were verified, the  bilateral  knee was sterilly prepped in the standard fashion.  A 22-gauge needle was introduced into bilateral knee joint from an juan-lateral site without complication and knee was then injected with 2 ml of Euflexxa.  Sterile dressing was applied.  The patient was instructed to resume activities as tolerated and to call with any problems.     We will see Buffy Morin back next week for the second injection.

## 2018-04-04 ENCOUNTER — OFFICE VISIT (OUTPATIENT)
Dept: ORTHOPEDICS | Facility: CLINIC | Age: 58
End: 2018-04-04
Payer: COMMERCIAL

## 2018-04-04 VITALS — WEIGHT: 158.19 LBS | BODY MASS INDEX: 27.01 KG/M2 | HEIGHT: 64 IN

## 2018-04-04 DIAGNOSIS — M17.0 PRIMARY OSTEOARTHRITIS OF BOTH KNEES: Primary | ICD-10-CM

## 2018-04-04 PROCEDURE — 20610 DRAIN/INJ JOINT/BURSA W/O US: CPT | Mod: 50,S$GLB,, | Performed by: PHYSICIAN ASSISTANT

## 2018-04-04 PROCEDURE — 99499 UNLISTED E&M SERVICE: CPT | Mod: S$GLB,,, | Performed by: PHYSICIAN ASSISTANT

## 2018-04-04 PROCEDURE — 99999 PR PBB SHADOW E&M-EST. PATIENT-LVL III: CPT | Mod: PBBFAC,,, | Performed by: PHYSICIAN ASSISTANT

## 2018-04-04 RX ADMIN — Medication 40 MG: at 03:04

## 2018-04-04 NOTE — PROGRESS NOTES
Buffy Morin is a 57 y.o. year old her here today for her 2nd Euflexxa injection for degenerative changes of her bilateral knee . she was last seen and treated in the clinic on 3/23/2018. There has been no significant change in her medical status since her last visit. No Fever, chills, malaise, or unexplained weight change.      Allergies, Medications, past medical and surgical history were reviewed .    Examination of the knee demonstrates  No evidence of edema, erythema , echymosis strength and range of motion are unchanged from previous visit.    The injection site was identified and the skin was prepared with a betadine solution. The  bilateral knee  joint was injected with 2 ml of Euflexxa solution under sterile technique. Buffy Morin tolerated the procedure well, she was advised to rest the knee today, ice and elevation. I may take 3 -6 weeks following the last injection to get the full benefit of the medication.  I will see her back in 1 week. Sooner if he has any problems or concerns.           .

## 2018-04-05 ENCOUNTER — TELEPHONE (OUTPATIENT)
Dept: AUDIOLOGY | Facility: CLINIC | Age: 58
End: 2018-04-05

## 2018-04-11 ENCOUNTER — OFFICE VISIT (OUTPATIENT)
Dept: ORTHOPEDICS | Facility: CLINIC | Age: 58
End: 2018-04-11
Payer: COMMERCIAL

## 2018-04-11 VITALS
DIASTOLIC BLOOD PRESSURE: 86 MMHG | HEIGHT: 64 IN | HEART RATE: 86 BPM | SYSTOLIC BLOOD PRESSURE: 137 MMHG | WEIGHT: 158.06 LBS | BODY MASS INDEX: 26.98 KG/M2

## 2018-04-11 DIAGNOSIS — M17.0 PRIMARY OSTEOARTHRITIS OF BOTH KNEES: Primary | ICD-10-CM

## 2018-04-11 PROCEDURE — 99499 UNLISTED E&M SERVICE: CPT | Mod: S$GLB,,, | Performed by: PHYSICIAN ASSISTANT

## 2018-04-11 PROCEDURE — 20610 DRAIN/INJ JOINT/BURSA W/O US: CPT | Mod: 50,S$GLB,, | Performed by: PHYSICIAN ASSISTANT

## 2018-04-11 PROCEDURE — 99999 PR PBB SHADOW E&M-EST. PATIENT-LVL III: CPT | Mod: PBBFAC,,, | Performed by: PHYSICIAN ASSISTANT

## 2018-04-11 RX ADMIN — Medication 40 MG: at 03:04

## 2018-04-11 NOTE — PROGRESS NOTES
Buffy Morin is a 57 y.o. year old her here today for her 3rd Euflexxa injection for degenerative changes of her bilateral knee . she was last seen and treated in the clinic on 4/4/2018. There has been no significant change in her medical status since her last visit. No Fever, chills, malaise, or unexplained weight change.      Allergies, Medications, past medical and surgical history were reviewed .    Examination of the knee demonstrates  No evidence of edema, erythema , echymosis strength and range of motion are unchanged from previous visit.    The injection site was identified and the skin was prepared with a betadine solution. The  bilateral knee  joint was injected with 2 ml of Euflexxa solution under sterile technique. Buffy Morin tolerated the procedure well, she was advised to rest the knee today, ice and elevation. I may take 3 -6 weeks following the last injection to get the full benefit of the medication.  I will see her back in 4-6 months. Sooner if he has any problems or concerns.           .

## 2018-05-15 ENCOUNTER — NUTRITION (OUTPATIENT)
Dept: NUTRITION | Facility: CLINIC | Age: 58
End: 2018-05-15
Payer: COMMERCIAL

## 2018-05-15 DIAGNOSIS — Z00.00 WELLNESS EXAMINATION: ICD-10-CM

## 2018-05-15 DIAGNOSIS — Z71.3 DIETARY COUNSELING AND SURVEILLANCE: Primary | ICD-10-CM

## 2018-05-15 PROCEDURE — 97802 MEDICAL NUTRITION INDIV IN: CPT | Mod: S$GLB,,, | Performed by: DIETITIAN, REGISTERED

## 2018-05-15 NOTE — PATIENT INSTRUCTIONS
? Aim for 7-8 hours sleep min nightly  ? Exercise 60 minutes most days  ? Eat breakfast within 1-2 hours of waking up  ? Omit juice, try crystal light pure or other alternatives on our grocery list   ? Try not to skip any meals or snacks, not going more than 3-4 hours without eating.   ? At each meal and snack, try to include a source of fiber + lean protein + healthy fat.   ? For lowering cholesterol:   - Minimize intake of animal based saturated fats (high fats milks, cheese, butter, high fat meats like sausage, ribs, ribeyes, etc). Leaner options like reduced fat cheeses, 0-1% milk, eggs, Greek yogurt, lean proteins are all great alternatives + options.   - Incorporate fiber into plan; think plants like fruits, veggies, beans, nuts, etc.      Breakfast    Srinivas 100% whole wheat English muffin + protein  o Egg (2 whole eggs or 1 whole egg + extra whites)  o Peanut butter  o Smoked salmon, whipped cream cheese or ¼ avocado   Oatmeal: 1/3 cup oats + unsweetened almond milk + vanilla extract + stevia + cinnamon + ½ cup fruit + 2 Tbsp nuts/seeds     2 eggs muffins (can add fruit or yogurt)   Yogurt Bowl  o Fruit (1 cup)  o 6-8 ounces plain (Fage 2%) Greek yogurt  o splash of vanilla extract, stevia extract, madhava agave 5 as needed   o 2 tbsp nuts and/or seeds    Any snack option (think fruit + protein)       Snacks   Any breakfast option   Fruit of choice (1 piece or size of a tennis ball, i.e. orange, pear, peach, etc or 1 cup melon/berries) + protein:  o nuts (2 tablespoons or 100 calorie pack)   o nut butter (1 tablespoon)  o cheese (reduced fat, light, part-skim, 2% cheese options)  o beef jerky (see grocery list for recommended brands)  o hard boiled egg/s (1 yolk)    Flavored Greek Yogurt   o Chobani simply 100  o Dannon triple zero  o Chobani simply 100 crunch   10-12 Beanitos + salsa or laughing cow wedge or 100 calorie guacamole/avocado cup       lunch // dinner    Think lean protein + unlimited  non starchy veggie     Click hyperlinks for sample recipes     Sheet Pan Pork Chops with Roasted Sweets & Beets    Fish Tacos + slaw   Shrimp + asparagus stir hernandez   Grilled [place] Kabobs   Bell pepper nachos    Tuna Avocado Salad    Grilled Shrimp Salad   Shrimp Taco Salad   Sheet Pan [place] with Asparagus    Wheeler Chicken lettuce cups   Chicken club lettuce cups   Slow cooker Tiffany   Pulled Pork tenderloin       Evening snack:   Dole Dippers (individually wrapped frozen chocolate dipped fruit)

## 2018-05-15 NOTE — PROGRESS NOTES
Nutrition Assessment for Medical Nutrition Therapy    Referring professional: self     Reason for MNT visit: Pt in for education and nutrition counseling regarding eating right, lowering cholesterol, and desire to lose weight.      Medical History:      Anxiety   Carpal tunnel syndrome   Mild vitamin D deficiency   Mixed hyperlipidemia   Family history of breast cancer in sister   Essential hypertension   S/P hysterectomy   Abnormal ECG         Medications:   Current Outpatient Prescriptions:     amlodipine (NORVASC) 5 MG tablet, Take 1 tablet (5 mg total) by mouth once daily., Disp: 90 tablet, Rfl: 2    cholecalciferol, vitamin D3, (VITAMIN D) 2,000 unit Cap, Take 1 capsule by mouth Daily. Take vitamin D 2000 units every day., Disp: , Rfl:     escitalopram oxalate (LEXAPRO) 10 MG tablet, Take 1 tablet (10 mg total) by mouth once daily., Disp: 30 tablet, Rfl: 11    LORazepam (ATIVAN) 0.5 MG tablet, Take 1 tablet (0.5 mg total) by mouth daily as needed for Anxiety (as needed for anxiety no more than 2-3 x weekly)., Disp: 30 tablet, Rfl: 1    Labs:   Go365 blood work patient stated cholesterol has come down to 215.  Lipids (Up to 3 Results) (Last 3 results in 3 years)      Chol HDL LDL CHOLc) TG   02/26/18 0831 233 64 152.8 81     Diabetes (Up to 3 Results) (Last 3 results in 3 years)      HgbA1C Glucose Fasting   09/22/17 1737 6.1 --     Thyroids (Up to 3 Results) (Last 3 results in 3 years)      TSH T3 T4 FT4 TBG   02/26/18 0831 1.425 -- -- -- --   09/22/17 1737 1.957 -- -- -- --         Wt: 152 lbs patient stated down from 168 lbs.     BMI: 27  Exercise: walking more + riding bike (4 days per week) & walk around the building in the am and at lunch       Nutrition History       Food allergies  intolerances: nka    Dining out: tries to get a salad when eating out    Beverages:   Water  Every now and then sprite (just a sip, literally)  OJ + apple juice     Typical day recall:  Banana in am     2 hours later:  oatmeal + blueberries, black berries + cranberries    Lunch: salad    Home: chicken strips or fish or turkey  + salad or mixed veggies or broccoli     Recommendations/Interventions:  ?  Aim for 7-8 hours sleep min nightly  ? Exercise 60 minutes most days  ? Eat breakfast within 1-2 hours of waking up  ? Try not to skip any meals or snacks, not going more than 3-4 hours without eating.   ? At each meal and snack, try to include a source of fiber + lean protein + healthy fat.   ? Omit juice, try crystal light pure or other alternatives on our grocery list   ? For lowering cholesterol:   - Minimize intake of animal based saturated fats (high fats milks, cheese, butter, high fat meats like sausage, ribs, ribeyes, etc). Leaner options like reduced fat cheeses, 0-1% milk, eggs, Greek yogurt, lean proteins are all great alternatives + options.   - Incorporate fiber into plan; think plants like fruits, veggies, beans, nuts, etc.      Breakfast    Srinivas 100% whole wheat English muffin + protein  o Egg (2 whole eggs or 1 whole egg + extra whites)  o Peanut butter  o Smoked salmon, whipped cream cheese or ¼ avocado   Oatmeal: 1/3 cup oats + unsweetened almond milk + vanilla extract + stevia + cinnamon + ½ cup fruit + 2 Tbsp nuts/seeds     2 eggs muffins (can add fruit or yogurt)   Yogurt Bowl  o Fruit (1 cup)  o 6-8 ounces plain (Fage 2%) Greek yogurt  o splash of vanilla extract, stevia extract, madhava agave 5 as needed   o 2 tbsp nuts and/or seeds    Any snack option (think fruit + protein)       Snacks   Any breakfast option   Fruit of choice (1 piece or size of a tennis ball, i.e. orange, pear, peach, etc or 1 cup melon/berries) + protein:  o nuts (2 tablespoons or 100 calorie pack)   o nut butter (1 tablespoon)  o cheese (reduced fat, light, part-skim, 2% cheese options)  o beef jerky (see grocery list for recommended brands)  o hard boiled egg/s (1 yolk)    Flavored Greek Yogurt   o Chobani simply 100  o Dannon  triple zero  o Chobani simply 100 crunch   10-12 Beanitos + salsa or laughing cow wedge or 100 calorie guacamole/avocado cup       lunch // dinner    Think lean protein + unlimited non starchy veggie     Click hyperlinks for sample recipes     Sheet Pan Pork Chops with Roasted Sweets & Beets    Fish Tacos + slaw   Shrimp + asparagus stir hernandez   Grilled Fannin Kabobs   Bell pepper nachos    Tuna Avocado Salad    Grilled Shrimp Salad   Shrimp Taco Salad   Sheet Pan Fannin with Asparagus    [place] Chicken lettuce cups   Chicken club lettuce cups   Slow cooker Fajitas   Pulled Pork tenderloin       Evening snack:  Dole Dippers (individually wrapped frozen chocolate dipped fruit)     Written Materials Provided  These resources are intended to assist the patient in making it easier to choose recommended options when eating out & to identify better-for-you brands at the grocery store:     Customized meal plan   Eat Fit pat card as a reminder to download the pat to ones smart phone which provides: current Eat Fit partners, approved menu options, food labels for carb counting, & recipes    Fast Food Lite Guide   Eat Fit Grocery Product list    RD contact information      Comprehension: good     Motivation to change: high      Follow-up: 1 year     Counseling time: 1 hour

## 2018-06-16 DIAGNOSIS — I10 ESSENTIAL HYPERTENSION: ICD-10-CM

## 2018-06-18 RX ORDER — AMLODIPINE BESYLATE 5 MG/1
5 TABLET ORAL DAILY
Qty: 90 TABLET | Refills: 2 | Status: SHIPPED | OUTPATIENT
Start: 2018-06-18 | End: 2019-03-27 | Stop reason: SDUPTHER

## 2018-07-05 ENCOUNTER — TELEPHONE (OUTPATIENT)
Dept: ORTHOPEDICS | Facility: CLINIC | Age: 58
End: 2018-07-05

## 2018-07-05 ENCOUNTER — LAB VISIT (OUTPATIENT)
Dept: LAB | Facility: HOSPITAL | Age: 58
End: 2018-07-05
Attending: INTERNAL MEDICINE
Payer: COMMERCIAL

## 2018-07-05 ENCOUNTER — TELEPHONE (OUTPATIENT)
Dept: INTERNAL MEDICINE | Facility: CLINIC | Age: 58
End: 2018-07-05

## 2018-07-05 DIAGNOSIS — E78.2 MIXED HYPERLIPIDEMIA: Primary | ICD-10-CM

## 2018-07-05 DIAGNOSIS — E78.2 MIXED HYPERLIPIDEMIA: ICD-10-CM

## 2018-07-05 LAB
CHOLEST SERPL-MCNC: 235 MG/DL
CHOLEST/HDLC SERPL: 3.3 {RATIO}
HDLC SERPL-MCNC: 71 MG/DL
HDLC SERPL: 30.2 %
LDLC SERPL CALC-MCNC: 155 MG/DL
NONHDLC SERPL-MCNC: 164 MG/DL
TRIGL SERPL-MCNC: 45 MG/DL

## 2018-07-05 PROCEDURE — 36415 COLL VENOUS BLD VENIPUNCTURE: CPT

## 2018-07-05 PROCEDURE — 80061 LIPID PANEL: CPT

## 2018-07-05 RX ORDER — MELOXICAM 15 MG/1
TABLET ORAL
COMMUNITY
Start: 2018-06-18 | End: 2018-07-16

## 2018-07-05 RX ORDER — ATORVASTATIN CALCIUM 20 MG/1
20 TABLET, FILM COATED ORAL DAILY
Qty: 90 TABLET | Refills: 3 | Status: SHIPPED | OUTPATIENT
Start: 2018-07-05 | End: 2019-06-24 | Stop reason: SDUPTHER

## 2018-07-05 NOTE — TELEPHONE ENCOUNTER
Cholesterol is actually higher than previously and I recommend medication for cholesterol again as we discussed in her recent visit.      I would recommend Lipitor or Crestor which are usually well tolerated; we would monitor lab work every 3 months.  Side effects can sometimes include muscle pain which is not usually severe but can be treated with CoQ 10.  We check the liver closely as well with frequent labs.    If she is willing, I will send a medication to the pharmacy and arrange for follow-up lab work.

## 2018-07-05 NOTE — TELEPHONE ENCOUNTER
----- Message from Barbara Dewey sent at 7/5/2018  2:50 PM CDT -----  Contact: self/[phone]  Patient called in regards needing to talk with Dr Carrasquillo nurse (patient stated that she was talking to the nurse and got disconected, did not specify what she was talking about). Please call and advise. Thank you!!!

## 2018-07-05 NOTE — TELEPHONE ENCOUNTER
----- Message from Rodrigo Sampson PA-C sent at 7/5/2018 10:00 AM CDT -----  Contact: Pt  No.  One 15mg Mobic is a full prescription dose for a day. any more will not relieve her symptoms. It will just over work her kidneys and liver.    ----- Message -----  From: Shreya Arevalo MA  Sent: 7/5/2018   9:13 AM  To: Rodrigo Sampson PA-C        ----- Message -----  From: Chen Borden  Sent: 7/5/2018   8:55 AM  To: Adrián Wong Staff    Pt says her shoulder and hands are bothering her and need to know if she can take 2 pills that was prescribed to her    Pt can be reached at [phone]    Thanks

## 2018-07-05 NOTE — TELEPHONE ENCOUNTER
I spoke to the pt letting her know that she needs to take her medication as prescribed. Also, that taking any additional pills will not improve her symptoms, but add additional strain on her liver and kidneys.

## 2018-07-12 ENCOUNTER — OFFICE VISIT (OUTPATIENT)
Dept: ORTHOPEDICS | Facility: CLINIC | Age: 58
End: 2018-07-12
Payer: COMMERCIAL

## 2018-07-12 ENCOUNTER — HOSPITAL ENCOUNTER (OUTPATIENT)
Dept: RADIOLOGY | Facility: HOSPITAL | Age: 58
Discharge: HOME | End: 2018-07-12
Attending: PHYSICIAN ASSISTANT
Payer: COMMERCIAL

## 2018-07-12 DIAGNOSIS — M25.511 ACUTE PAIN OF RIGHT SHOULDER: ICD-10-CM

## 2018-07-12 DIAGNOSIS — M25.511 ACUTE PAIN OF RIGHT SHOULDER: Primary | ICD-10-CM

## 2018-07-12 PROCEDURE — 99213 OFFICE O/P EST LOW 20 MIN: CPT | Mod: 25,S$GLB,, | Performed by: PHYSICIAN ASSISTANT

## 2018-07-12 PROCEDURE — 73030 X-RAY EXAM OF SHOULDER: CPT | Mod: 26,RT,, | Performed by: RADIOLOGY

## 2018-07-12 PROCEDURE — 20610 DRAIN/INJ JOINT/BURSA W/O US: CPT | Mod: RT,S$GLB,, | Performed by: PHYSICIAN ASSISTANT

## 2018-07-12 PROCEDURE — 73030 X-RAY EXAM OF SHOULDER: CPT | Mod: TC,RT

## 2018-07-12 PROCEDURE — 99999 PR PBB SHADOW E&M-EST. PATIENT-LVL III: CPT | Mod: PBBFAC,,, | Performed by: PHYSICIAN ASSISTANT

## 2018-07-12 RX ORDER — BETAMETHASONE SODIUM PHOSPHATE AND BETAMETHASONE ACETATE 3; 3 MG/ML; MG/ML
6 INJECTION, SUSPENSION INTRA-ARTICULAR; INTRALESIONAL; INTRAMUSCULAR; SOFT TISSUE
Status: COMPLETED | OUTPATIENT
Start: 2018-07-12 | End: 2018-07-12

## 2018-07-12 RX ADMIN — BETAMETHASONE SODIUM PHOSPHATE AND BETAMETHASONE ACETATE 6 MG: 3; 3 INJECTION, SUSPENSION INTRA-ARTICULAR; INTRALESIONAL; INTRAMUSCULAR; SOFT TISSUE at 11:07

## 2018-07-12 NOTE — PROGRESS NOTES
SUBJECTIVE:     Chief Complaint & History of Present Illness:  Buffy Morin is a  Established  patient 57 y.o. female who is seen here today with a complaint of  Right shoulder pain .  She is patient well known to me was last seen treated in the clinic [facility] for/11/2018 at which time she completed a course of the Visco supplementation injections for her knees from which she is doing very well Patient's here today for evaluation treatment of progressively worsening pain in the anterior and lateral aspects of her right shoulder for the past several weeks.  There's been no specific trauma or injury to the shoulder but she does do a significant amount of lifting and carrying and some overhead types of work.  The pain bothers her much at night and awakes her from sleep she has tried over-the-counter NSAIDs and rest with poor results  On a scale of 1-10, with 10 being worst pain imaginable, he rates this pain as 5 on good days and 8 on bad days.  she describes the pain as tender and sore.    Review of patient's allergies indicates:   Allergen Reactions    No known drug allergies          Current Outpatient Prescriptions   Medication Sig Dispense Refill    amLODIPine (NORVASC) 5 MG tablet TAKE 1 TABLET (5 MG TOTAL) BY MOUTH ONCE DAILY. 90 tablet 2    atorvastatin (LIPITOR) 20 MG tablet Take 1 tablet (20 mg total) by mouth once daily. 90 tablet 3    cholecalciferol, vitamin D3, (VITAMIN D) 2,000 unit Cap Take 1 capsule by mouth Daily. Take vitamin D 2000 units every day.      escitalopram oxalate (LEXAPRO) 10 MG tablet Take 1 tablet (10 mg total) by mouth once daily. 30 tablet 11    LORazepam (ATIVAN) 0.5 MG tablet Take 1 tablet (0.5 mg total) by mouth daily as needed for Anxiety (as needed for anxiety no more than 2-3 x weekly). 30 tablet 1    meloxicam (MOBIC) 15 MG tablet        No current facility-administered medications for this visit.        Past Medical History:   Diagnosis Date    Abnormal Pap smear of  cervix     Anxiety     Carpal tunnel syndrome     Elevated blood pressure     Family history of breast cancer in sister 10/1/2013    Hypertension     S/P hysterectomy 9/23/2017       Past Surgical History:   Procedure Laterality Date    BREAST BIOPSY Right     2007    HYSTERECTOMY      TUBAL LIGATION         Vital Signs (Most Recent)  There were no vitals filed for this visit.    Review of Systems:  ROS:  Constitutional: no fever or chills  Eyes: no visual changes  ENT: no nasal congestion or sore throat  Respiratory: no cough or shortness of breath  Cardiovascular: no chest pain or palpitations  Gastrointestinal: no nausea or vomiting, tolerating diet  Genitourinary: no hematuria or dysuria  Integument/Breast: no rash or pruritis  Hematologic/Lymphatic: no easy bruising or lymphadenopathy  Musculoskeletal: no arthralgias or myalgias  Neurological: no seizures or tremors  Behavioral/Psych: no auditory or visual hallucinations, Positive for anxiety  Endocrine: no heat or cold intolerance        OBJECTIVE:     PHYSICAL EXAM:     , General Appearance: Well nourished, well developed, in no acute distress.  Neurological: Mood & affect are normal.  Shoulder exam: right  Tenderness: AC joint, biceps tendon  ROM: forward flexion 180/180, extension 45/45, full abduction 180/180, abduction-glenohumeral 90/90, external rotation 50/50, pain at the extremes of mobility  Shoulder Strength: biceps 5/5, triceps 5/5, abduction 5/5, adduction 5/5, external rotation 5/5 with shoulder at side, flexion 5/5, and extension 5/5  negative for tenderness about the glenohumeral joint, positive for tenderness over the acromioclavicular joint and positive for impingement sign  Stability tests: anterior apprehension test positive for pain only and posterior apprehension test negative  Special Tests:Cross-chest abduction: negative                     RADIOGRAPHS:  X-rays taken today films viewed by me demonstrate mild arthritic changes  throughout the shoulder with some glenohumeral joint space narrowing    ASSESSMENT/PLAN:     Plan: We discussed with the patient at length all the different treatment options available for her rightshoulder including anti-inflammatories, acetaminophen, rest, ice, Physical therapy to include strengthening exercise, occasional cortisone injections for temporary relief, arthroscopic surgical repair, and finally shoulder arthroplasty.   We'll proceed with therapeutic diagnostic cortisone injection of the right shoulder followed by course of physical therapy      The injection site was identified and the skin was prepared with an ETOH solution. The    right  shoulder was injected with 1 ml of Celestone and 5 ml Lidocaine under sterile technique. Buffy Morin tolerated the procedure well, she was advised to rest the  shoulder  today, ice and support. she did receive immediate relief of the pain in and about her  shoulder  she was told this would be short lived and is secondary to the lidocaine. she may have an increase in her discomfort tonight followed by steady improvement over the next several days. It may take 1-3 weeks following the injection to get the full benefit of the medication.  I will see her back in 4 weeks. Sooner if she has any problems or concerns.

## 2018-07-16 ENCOUNTER — CLINICAL SUPPORT (OUTPATIENT)
Dept: REHABILITATION | Facility: HOSPITAL | Age: 58
End: 2018-07-16
Payer: COMMERCIAL

## 2018-07-16 DIAGNOSIS — M79.601 PAIN OF RIGHT UPPER EXTREMITY: ICD-10-CM

## 2018-07-16 DIAGNOSIS — M25.60 STIFFNESS IN JOINT: ICD-10-CM

## 2018-07-16 DIAGNOSIS — R53.1 WEAKNESS: ICD-10-CM

## 2018-07-16 PROCEDURE — 97110 THERAPEUTIC EXERCISES: CPT | Mod: PN

## 2018-07-16 PROCEDURE — 97165 OT EVAL LOW COMPLEX 30 MIN: CPT | Mod: PN

## 2018-07-16 RX ORDER — MELOXICAM 15 MG/1
15 TABLET ORAL DAILY
Qty: 30 TABLET | Refills: 1 | Status: SHIPPED | OUTPATIENT
Start: 2018-07-16 | End: 2018-09-17 | Stop reason: SDUPTHER

## 2018-07-16 NOTE — PLAN OF CARE
TIME RECORD    Date: 2018    Start Time:  400  Stop Time:  445    PROCEDURES:    TIMED  Procedure Min.   TE 10             UNTIMED  Procedure Min.   IE 35         Total Timed Minutes:  10  Total Timed Units:  1  Total Untimed Units:  1  Charges Billed/# of units:  LCE1    Visit:1 FOTO @ 5    OCCUPATIONAL THERAPY INITIAL EVALUATION & PLAN OF TREATMENT    Patient Name: Buffy Morin  Physician Name:  Adrián Rodrigo/Haja  Primary Diagnosis:  R shoulder pain  Treatment Diagnosis:  Pain in limb, weakness, stiffness in joint  Onset Date:  3 weeks ago  Eval Date:  2018  Certification Period:  2018 to 2018  Past Medical History:   Past Medical History:   Diagnosis Date    Abnormal Pap smear of cervix     Anxiety     Carpal tunnel syndrome     Elevated blood pressure     Family history of breast cancer in sister 10/1/2013    Hypertension     S/P hysterectomy 2017   Precautions:  universal  Prior Therapy:  None for shoulder  Signs of Abuse: no  Medications: Buffy Morin has a current medication list which includes the following prescription(s): amlodipine, atorvastatin, cholecalciferol (vitamin d3), escitalopram oxalate, lorazepam, and meloxicam.  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 Presents to clinic today with decreased ROM, weakness, and pain all of which limit her functionally.   Patient Therapy Goals:  To decrease pain and increase functional use RUE  Hand dominance: Right  X-Rays/Tests: xrays reveal no abnormalities; no MRI    Subjective:  Pain:  During no work: 10  While workin/10  Sleepin/10  Location of pain: anterior shoulder, biceps  region    Objective:  Sensation Test: Patient denies any numbness/tingling    Observation/Inspection:rounded shoulders    Range of Motion:   Shoulder  Right   Left  Pain/Dysfunction with Movement    AROM PROM MMT AROM PROM MMT    flexion 140 150 4/5 WNL WNL WNL    extension 60 WNL 5/5 WNL WNL WNL    abduction 120 140 4/5 WNL WNL WNL    adduction 15 NT 4/5 WNL WNL WNL    Internal rotation S2 60 4/5 WNL WNL WNL    ER at 90° abd 85 WNL 4/5 WNL WNL WNL    ER at 0° abd 80 WNL 4/5 WNL WNL WNL      ROM Comments: Pain at end range    Painful Arc: Patient demonstrates no painful arc in shoulder flexion or abduction    Special Tests:  Positive: Int. Rot. and ADD. Impingement and Wilbert Test  Negative: Empty can test    Palpation: (for pain)     Positive: Anterior Subacromial Space and Bicipital Groove, upper trap    Limitations of Functional Status:   Self Care: inability or pain with donning bra, washing body  Work: compensates by taking rest breaks or lifting smaller batches of paper and files  Leisure: works through the hurt, mostly Independent with leisure tasks    Test: Patient scored 50% on FOTO shoulder survey demonstrating Pt's functional ability with upper extremity.     Treatment included: OT evaluation, the following exercises (HEP) were instructed and Buffy was able to demonstrate them prior to the end of the session. HEP are as follows: see attached in media     Assessment  This 57 y.o. female referred to Outpatient Occupational Therapy with diagnosis of   Encounter Diagnoses   Name Primary?    Pain of right upper extremity     Weakness     Stiffness in joint     presents with limitations as described in problem list. Patient can benefit from Occupational Therapy services for moist heat, PROM, AAROM, AROM, Theraputic exercises, joint mobs, home exercise program provied with written instructions, ice and strengthening. The following goals were discussed with the patient and she is in agreement with them as to  be addressed in the treatment plan.     History Examination Decision Making Complexity Score   Occupational Profile: Did an expanded chart review        Medical and Therapy History:     Comorbidities that may affect POC include: HTN          Low   Performance Deficits   Dominant UE affected    Physical  Decreased function of Right UE, Decreased ROM, Increased pain, Decreased strength, Scapular winging, Hypomobility, Muscular atrophy, Inability to perform work/tasks, Difficulty sleeping, Inability to perform leisure activiites and Inability to perform self care tasks    Cognitive  N/A      Psychosocial:    Pt unable to perform the following inability or pain with donning bra, washing body, compensates by taking rest breaks or lifting smaller batches of paper and files, works through the hurt, mostly Independent with leisure tasks all of which were a part of pt's habits, roles, routines, interpersonal interactions prior to injury    Moderate Foto score:50%    Pt has several treatment options including ASTYM, IASTM, cupping, soft tissue mobs, joint mobs, therex, therapeutic activity, education, endurance training, modalities etc.      Discussed goals and Pt in agreement with goals.    Issued HEP at [facility] and pt able to perform all with minimal verbal and tactile cues provided by OT. Pt able to complete all without c/o and demonstrating good technique    Low Low     Rehab Potential: good    Goals to be met in 4 weeks: (8/16/2018)  1) Initiate Hep   2) Pt will increase R shoulder AROM by 10 degrees grossly for improved performance with overhead ADL's  3) Pt will report 5/10 pain in (R)shoulder at worst  4) Pt will demonstrate increased MMT to 4-/5 grossly R shoulder  5) Patient will be able to achieve less than or equal to 35% on FOTO shoulder survey demonstrating overall improved functional ability with upper extremity.     Goals to be met by discharge:  1) Independent with HEP  2) Pt will demonstrate (R) shoulder AROM  WNL grossly for [place] with ADL's  3) Pt will demonstrate (R) shoulder MMT WNL grossly for [place] with functional activities  4) Independent and pain free with ADL's and IADL's  5) Patient will be able to achieve less than or equal to 15% on FOTO shoulder survey demonstrating overall improved functional ability with upper extremity.     Plan  Recommended Treatment Plan Pt wanting to trial HEP on her own at this time if she should have any difficulty plan to see her (1-2 times per week for 8 weeks): Therapeutic Exercise, Functional Activities, Patient Education, Home Exercise Program, Ultrasound/Phonophoresis, Edema Control, Electrical Stimulation/TENS/Interferential, Moist Heat/Ice, Fluidotherapy and Manual Therapy  Other Recommendations:  GRIFFIN/ASTYM PRN    Therapist's Name: Maria M COSTA   Date: 07/16/2018    I CERTIFY THE NEED FOR THESE SERVICES FURNISHED UNDER THIS PLAN OF TREATMENT AND WHILE UNDER MY CARE    Physician's comments: ________________________________________________________________________________________________________________________________________________      Physician's Name: ___________________________________

## 2018-09-17 RX ORDER — MELOXICAM 15 MG/1
15 TABLET ORAL DAILY
Qty: 30 TABLET | Refills: 1 | Status: SHIPPED | OUTPATIENT
Start: 2018-09-17 | End: 2019-08-02 | Stop reason: SDUPTHER

## 2018-10-12 ENCOUNTER — LAB VISIT (OUTPATIENT)
Dept: LAB | Facility: HOSPITAL | Age: 58
End: 2018-10-12
Attending: INTERNAL MEDICINE
Payer: COMMERCIAL

## 2018-10-12 ENCOUNTER — TELEPHONE (OUTPATIENT)
Dept: INTERNAL MEDICINE | Facility: CLINIC | Age: 58
End: 2018-10-12

## 2018-10-12 DIAGNOSIS — E78.2 MIXED HYPERLIPIDEMIA: ICD-10-CM

## 2018-10-12 LAB
AST SERPL-CCNC: 24 U/L
CHOLEST SERPL-MCNC: 156 MG/DL
CHOLEST/HDLC SERPL: 2.3 {RATIO}
HDLC SERPL-MCNC: 68 MG/DL
HDLC SERPL: 43.6 %
LDLC SERPL CALC-MCNC: 79.8 MG/DL
NONHDLC SERPL-MCNC: 88 MG/DL
TRIGL SERPL-MCNC: 41 MG/DL

## 2018-10-12 PROCEDURE — 80061 LIPID PANEL: CPT

## 2018-10-12 PROCEDURE — 36415 COLL VENOUS BLD VENIPUNCTURE: CPT | Mod: PO

## 2018-10-12 PROCEDURE — 84450 TRANSFERASE (AST) (SGOT): CPT

## 2018-10-12 NOTE — TELEPHONE ENCOUNTER
----- Message from Maria Del Rosario Frazier sent at 10/12/2018 12:27 PM CDT -----  Contact: [phone]  Patient is requesting results of her cholesterol test.    Please advise, thank you

## 2018-10-12 NOTE — TELEPHONE ENCOUNTER
Pt informed results are still pending and md will call her once complete.   [facility] [phone]  or the nursing supervisor at [phone] after hours.    · In case of emergency, please go to the nearest emergency room for care.

## 2018-10-13 ENCOUNTER — TELEPHONE (OUTPATIENT)
Dept: INTERNAL MEDICINE | Facility: CLINIC | Age: 58
End: 2018-10-13

## 2018-10-13 NOTE — TELEPHONE ENCOUNTER
Please let her know labs look great, continue same regimen  Follow up for EPP with labs 6 months (hold list)thanks

## 2018-10-25 ENCOUNTER — TELEPHONE (OUTPATIENT)
Dept: INTERNAL MEDICINE | Facility: CLINIC | Age: 58
End: 2018-10-25

## 2018-10-25 NOTE — TELEPHONE ENCOUNTER
----- Message from Dee Montana sent at 10/25/2018 12:35 PM CDT -----  Contact: self/[phone]  Pt called in regards to having question about some test results she has been looking for       Please advise

## 2018-10-25 NOTE — TELEPHONE ENCOUNTER
----- Message from Abby Jarquin sent at 10/25/2018  2:16 PM CDT -----  Contact: selff [phone]  Patient needs to ask one more question, she just talk to the practice, please call her back.    Thank you

## 2019-01-11 ENCOUNTER — TELEPHONE (OUTPATIENT)
Dept: INTERNAL MEDICINE | Facility: CLINIC | Age: 59
End: 2019-01-11

## 2019-01-11 RX ORDER — ESCITALOPRAM OXALATE 10 MG/1
10 TABLET ORAL DAILY
Qty: 90 TABLET | Refills: 3 | Status: SHIPPED | OUTPATIENT
Start: 2019-01-11 | End: 2019-12-30

## 2019-01-11 NOTE — TELEPHONE ENCOUNTER
Aidee, left detailed message for pt to call back to schedule appt with you in 1 -2 months and also to schedule lab appt. I will try to call again later.

## 2019-01-11 NOTE — TELEPHONE ENCOUNTER
Meds ok'd; patient needs EPP appt in next 1-2 months, please pend labs and call patient to schedule; please let me know thanks

## 2019-01-28 ENCOUNTER — TELEPHONE (OUTPATIENT)
Dept: INTERNAL MEDICINE | Facility: CLINIC | Age: 59
End: 2019-01-28

## 2019-01-28 DIAGNOSIS — Z12.31 VISIT FOR SCREENING MAMMOGRAM: Primary | ICD-10-CM

## 2019-01-28 NOTE — TELEPHONE ENCOUNTER
----- Message from Herminio Gonzalez sent at 1/28/2019  9:09 AM CST -----  Contact: pt   Pt would like a call back regarding having orders for her dell put in and scheduled for [facility]       Pt can be reached at [phone]

## 2019-01-29 NOTE — TELEPHONE ENCOUNTER
Call pt and left detail message that orders are in but there is no oprion for Mammogram at [facility]

## 2019-02-06 ENCOUNTER — TELEPHONE (OUTPATIENT)
Dept: INTERNAL MEDICINE | Facility: CLINIC | Age: 59
End: 2019-02-06

## 2019-02-06 ENCOUNTER — HOSPITAL ENCOUNTER (OUTPATIENT)
Dept: RADIOLOGY | Facility: HOSPITAL | Age: 59
Discharge: HOME | End: 2019-02-06
Attending: INTERNAL MEDICINE
Payer: COMMERCIAL

## 2019-02-06 VITALS — WEIGHT: 158 LBS | BODY MASS INDEX: 27.12 KG/M2

## 2019-02-06 DIAGNOSIS — Z91.89 AT HIGH RISK FOR BREAST CANCER: ICD-10-CM

## 2019-02-06 DIAGNOSIS — Z12.31 VISIT FOR SCREENING MAMMOGRAM: ICD-10-CM

## 2019-02-06 PROCEDURE — 77067 SCR MAMMO BI INCL CAD: CPT | Mod: TC

## 2019-02-06 PROCEDURE — 77063 BREAST TOMOSYNTHESIS BI: CPT | Mod: 26,,, | Performed by: RADIOLOGY

## 2019-02-06 PROCEDURE — 77067 SCR MAMMO BI INCL CAD: CPT | Mod: 26,,, | Performed by: RADIOLOGY

## 2019-02-06 PROCEDURE — 77067 MAMMO DIGITAL SCREENING BILAT WITH TOMOSYNTHESIS_CAD: ICD-10-PCS | Mod: 26,,, | Performed by: RADIOLOGY

## 2019-02-06 PROCEDURE — 77063 MAMMO DIGITAL SCREENING BILAT WITH TOMOSYNTHESIS_CAD: ICD-10-PCS | Mod: 26,,, | Performed by: RADIOLOGY

## 2019-02-06 NOTE — TELEPHONE ENCOUNTER
Please let her know that her mammogram was acceptable.    However, there is a new risk calculator that we are using to try to identify individuals who may be at slightly higher risk for breast cancer.  She is now falling into that category with a a risk of 22%.    For these individuals, I am recommending a consultation in the breast Clinic as an office visit with one of our breast specialists to see if additional testing such as MRI may be beneficial.  I have placed a referral.  Please assist with this appointment.  Thank you

## 2019-02-06 NOTE — TELEPHONE ENCOUNTER
Spoke to pt and advised. She will give the office a call back with her decision. She did mention that her sister had breast cancer.

## 2019-03-26 ENCOUNTER — PATIENT OUTREACH (OUTPATIENT)
Dept: ADMINISTRATIVE | Facility: HOSPITAL | Age: 59
End: 2019-03-26

## 2019-03-27 DIAGNOSIS — I10 ESSENTIAL HYPERTENSION: ICD-10-CM

## 2019-03-27 RX ORDER — AMLODIPINE BESYLATE 5 MG/1
5 TABLET ORAL DAILY
Qty: 90 TABLET | Refills: 2 | Status: SHIPPED | OUTPATIENT
Start: 2019-03-27 | End: 2019-12-18 | Stop reason: SDUPTHER

## 2019-04-02 ENCOUNTER — LAB VISIT (OUTPATIENT)
Dept: LAB | Facility: HOSPITAL | Age: 59
End: 2019-04-02
Attending: INTERNAL MEDICINE
Payer: COMMERCIAL

## 2019-04-02 DIAGNOSIS — E78.2 MIXED HYPERLIPIDEMIA: ICD-10-CM

## 2019-04-02 DIAGNOSIS — Z00.00 ANNUAL PHYSICAL EXAM: ICD-10-CM

## 2019-04-02 LAB
25(OH)D3+25(OH)D2 SERPL-MCNC: 43 NG/ML (ref 30–96)
ALBUMIN SERPL BCP-MCNC: 4.1 G/DL (ref 3.5–5.2)
ALP SERPL-CCNC: 109 U/L (ref 55–135)
ALT SERPL W/O P-5'-P-CCNC: 20 U/L (ref 10–44)
ANION GAP SERPL CALC-SCNC: 6 MMOL/L (ref 8–16)
AST SERPL-CCNC: 27 U/L (ref 10–40)
BASOPHILS # BLD AUTO: 0.04 K/UL (ref 0–0.2)
BASOPHILS NFR BLD: 1 % (ref 0–1.9)
BILIRUB SERPL-MCNC: 0.4 MG/DL (ref 0.1–1)
BUN SERPL-MCNC: 10 MG/DL (ref 6–20)
CALCIUM SERPL-MCNC: 9.9 MG/DL (ref 8.7–10.5)
CHLORIDE SERPL-SCNC: 106 MMOL/L (ref 95–110)
CHOLEST SERPL-MCNC: 153 MG/DL (ref 120–199)
CHOLEST SERPL-MCNC: 153 MG/DL (ref 120–199)
CHOLEST/HDLC SERPL: 2.4 {RATIO} (ref 2–5)
CHOLEST/HDLC SERPL: 2.4 {RATIO} (ref 2–5)
CO2 SERPL-SCNC: 29 MMOL/L (ref 23–29)
CREAT SERPL-MCNC: 0.8 MG/DL (ref 0.5–1.4)
DIFFERENTIAL METHOD: ABNORMAL
EOSINOPHIL # BLD AUTO: 0.1 K/UL (ref 0–0.5)
EOSINOPHIL NFR BLD: 2.5 % (ref 0–8)
ERYTHROCYTE [DISTWIDTH] IN BLOOD BY AUTOMATED COUNT: 14 % (ref 11.5–14.5)
EST. GFR  (AFRICAN AMERICAN): >60 ML/MIN/1.73 M^2
EST. GFR  (NON AFRICAN AMERICAN): >60 ML/MIN/1.73 M^2
ESTIMATED AVG GLUCOSE: 131 MG/DL (ref 68–131)
GLUCOSE SERPL-MCNC: 99 MG/DL (ref 70–110)
HBA1C MFR BLD HPLC: 6.2 % (ref 4–5.6)
HCT VFR BLD AUTO: 41.4 % (ref 37–48.5)
HDLC SERPL-MCNC: 63 MG/DL (ref 40–75)
HDLC SERPL-MCNC: 63 MG/DL (ref 40–75)
HDLC SERPL: 41.2 % (ref 20–50)
HDLC SERPL: 41.2 % (ref 20–50)
HGB BLD-MCNC: 12.9 G/DL (ref 12–16)
IMM GRANULOCYTES # BLD AUTO: 0.01 K/UL (ref 0–0.04)
IMM GRANULOCYTES NFR BLD AUTO: 0.2 % (ref 0–0.5)
LDLC SERPL CALC-MCNC: 80.6 MG/DL (ref 63–159)
LDLC SERPL CALC-MCNC: 80.6 MG/DL (ref 63–159)
LYMPHOCYTES # BLD AUTO: 1.8 K/UL (ref 1–4.8)
LYMPHOCYTES NFR BLD: 44.4 % (ref 18–48)
MCH RBC QN AUTO: 26.8 PG (ref 27–31)
MCHC RBC AUTO-ENTMCNC: 31.2 G/DL (ref 32–36)
MCV RBC AUTO: 86 FL (ref 82–98)
MONOCYTES # BLD AUTO: 0.3 K/UL (ref 0.3–1)
MONOCYTES NFR BLD: 7.7 % (ref 4–15)
NEUTROPHILS # BLD AUTO: 1.8 K/UL (ref 1.8–7.7)
NEUTROPHILS NFR BLD: 44.2 % (ref 38–73)
NONHDLC SERPL-MCNC: 90 MG/DL
NONHDLC SERPL-MCNC: 90 MG/DL
NRBC BLD-RTO: 0 /100 WBC
PLATELET # BLD AUTO: 243 K/UL (ref 150–350)
PMV BLD AUTO: 11 FL (ref 9.2–12.9)
POTASSIUM SERPL-SCNC: 4.6 MMOL/L (ref 3.5–5.1)
PROT SERPL-MCNC: 7.8 G/DL (ref 6–8.4)
RBC # BLD AUTO: 4.81 M/UL (ref 4–5.4)
SODIUM SERPL-SCNC: 141 MMOL/L (ref 136–145)
TRIGL SERPL-MCNC: 47 MG/DL (ref 30–150)
TRIGL SERPL-MCNC: 47 MG/DL (ref 30–150)
WBC # BLD AUTO: 4.03 K/UL (ref 3.9–12.7)

## 2019-04-02 PROCEDURE — 80053 COMPREHEN METABOLIC PANEL: CPT

## 2019-04-02 PROCEDURE — 83036 HEMOGLOBIN GLYCOSYLATED A1C: CPT

## 2019-04-02 PROCEDURE — 80061 LIPID PANEL: CPT

## 2019-04-02 PROCEDURE — 36415 COLL VENOUS BLD VENIPUNCTURE: CPT | Mod: PO

## 2019-04-02 PROCEDURE — 82306 VITAMIN D 25 HYDROXY: CPT

## 2019-04-02 PROCEDURE — 85025 COMPLETE CBC W/AUTO DIFF WBC: CPT

## 2019-05-10 ENCOUNTER — TELEPHONE (OUTPATIENT)
Dept: INTERNAL MEDICINE | Facility: CLINIC | Age: 59
End: 2019-05-10

## 2019-05-10 NOTE — TELEPHONE ENCOUNTER
Labs are all acceptable, she should continue on her same medical regimen.  She needs to work on reducing sugar and carbohydrates since she is in the prediabetic range.  I was planning to review her results at her appointment in April but it looks like she canceled and rescheduled for June.  Thank you

## 2019-05-10 NOTE — TELEPHONE ENCOUNTER
----- Message from Jada Milian sent at 5/10/2019  9:36 AM CDT -----  Contact: Patient [phone]  Test Results Request:    Test Performed: Labs    When & Where: 04/02/2019 [facility]    Please call and advise.    Thank You

## 2019-06-14 ENCOUNTER — TELEPHONE (OUTPATIENT)
Dept: INTERNAL MEDICINE | Facility: CLINIC | Age: 59
End: 2019-06-14

## 2019-06-14 NOTE — TELEPHONE ENCOUNTER
Pt stated that she is having severe back apin and would like a RX to be send to her pharmacy   Please advise

## 2019-06-14 NOTE — TELEPHONE ENCOUNTER
Please let her know that she has not been seen for over a year and if the pain is severe, she really needs an office visit to evaluate what is going on.  Any urgent care provider or facility is reasonable.  Thank you

## 2019-06-14 NOTE — TELEPHONE ENCOUNTER
----- Message from Vicente Moe sent at 6/14/2019  9:53 AM CDT -----  Contact: Self [phone]  Patient would like to get medical advice.  Symptoms (please be specific):  Lower back pain  How long has patient had these symptoms:  month  Pharmacy name and phone # (copy/paste from chart):  [id number] - [place], [place] - [address] AT [facility] [phone] (Phone)  [phone] (Fax)  Any drug allergies (copy/paste from chart): None  Would the patient rather a call back or a response via [facility]?:  Call back  Comments:

## 2019-06-24 RX ORDER — ATORVASTATIN CALCIUM 20 MG/1
TABLET, FILM COATED ORAL
Qty: 90 TABLET | Refills: 3 | Status: SHIPPED | OUTPATIENT
Start: 2019-06-24 | End: 2020-06-25

## 2019-06-25 ENCOUNTER — OFFICE VISIT (OUTPATIENT)
Dept: INTERNAL MEDICINE | Facility: CLINIC | Age: 59
End: 2019-06-25
Payer: COMMERCIAL

## 2019-06-25 ENCOUNTER — IMMUNIZATION (OUTPATIENT)
Dept: PHARMACY | Facility: CLINIC | Age: 59
End: 2019-06-25
Payer: COMMERCIAL

## 2019-06-25 ENCOUNTER — LAB VISIT (OUTPATIENT)
Dept: LAB | Facility: HOSPITAL | Age: 59
End: 2019-06-25
Attending: INTERNAL MEDICINE
Payer: COMMERCIAL

## 2019-06-25 ENCOUNTER — TELEPHONE (OUTPATIENT)
Dept: SURGERY | Facility: CLINIC | Age: 59
End: 2019-06-25

## 2019-06-25 VITALS
DIASTOLIC BLOOD PRESSURE: 80 MMHG | BODY MASS INDEX: 26.95 KG/M2 | WEIGHT: 152.13 LBS | SYSTOLIC BLOOD PRESSURE: 125 MMHG | HEIGHT: 63 IN

## 2019-06-25 DIAGNOSIS — Z91.89 AT HIGH RISK FOR BREAST CANCER: ICD-10-CM

## 2019-06-25 DIAGNOSIS — R10.9 ABDOMINAL PAIN, UNSPECIFIED ABDOMINAL LOCATION: ICD-10-CM

## 2019-06-25 DIAGNOSIS — I10 ESSENTIAL HYPERTENSION: ICD-10-CM

## 2019-06-25 DIAGNOSIS — E78.2 MIXED HYPERLIPIDEMIA: ICD-10-CM

## 2019-06-25 DIAGNOSIS — Z80.3 FAMILY HISTORY OF BREAST CANCER IN SISTER: ICD-10-CM

## 2019-06-25 DIAGNOSIS — Z12.11 COLON CANCER SCREENING: ICD-10-CM

## 2019-06-25 DIAGNOSIS — Z00.00 ANNUAL PHYSICAL EXAM: Primary | ICD-10-CM

## 2019-06-25 PROBLEM — R53.1 WEAKNESS: Status: RESOLVED | Noted: 2018-07-16 | Resolved: 2019-06-25

## 2019-06-25 PROBLEM — R94.31 ABNORMAL ECG: Status: RESOLVED | Noted: 2018-03-06 | Resolved: 2019-06-25

## 2019-06-25 PROBLEM — M25.60 STIFFNESS IN JOINT: Status: RESOLVED | Noted: 2018-07-16 | Resolved: 2019-06-25

## 2019-06-25 PROBLEM — M79.601 PAIN OF RIGHT UPPER EXTREMITY: Status: RESOLVED | Noted: 2018-07-16 | Resolved: 2019-06-25

## 2019-06-25 LAB
BILIRUB UR QL STRIP: NEGATIVE
CLARITY UR REFRACT.AUTO: CLEAR
COLOR UR AUTO: NORMAL
CREAT SERPL-MCNC: 0.8 MG/DL (ref 0.5–1.4)
EST. GFR  (AFRICAN AMERICAN): >60 ML/MIN/1.73 M^2
EST. GFR  (NON AFRICAN AMERICAN): >60 ML/MIN/1.73 M^2
GLUCOSE UR QL STRIP: NEGATIVE
HGB UR QL STRIP: NEGATIVE
KETONES UR QL STRIP: NEGATIVE
LEUKOCYTE ESTERASE UR QL STRIP: NEGATIVE
NITRITE UR QL STRIP: NEGATIVE
PH UR STRIP: 7 [PH] (ref 5–8)
PROT UR QL STRIP: NEGATIVE
SP GR UR STRIP: 1.01 (ref 1–1.03)
URN SPEC COLLECT METH UR: NORMAL

## 2019-06-25 PROCEDURE — 3074F PR MOST RECENT SYSTOLIC BLOOD PRESSURE < 130 MM HG: ICD-10-PCS | Mod: CPTII,S$GLB,, | Performed by: INTERNAL MEDICINE

## 2019-06-25 PROCEDURE — 36415 COLL VENOUS BLD VENIPUNCTURE: CPT

## 2019-06-25 PROCEDURE — 99999 PR PBB SHADOW E&M-EST. PATIENT-LVL IV: CPT | Mod: PBBFAC,,, | Performed by: INTERNAL MEDICINE

## 2019-06-25 PROCEDURE — 82565 ASSAY OF CREATININE: CPT

## 2019-06-25 PROCEDURE — 99999 PR PBB SHADOW E&M-EST. PATIENT-LVL IV: ICD-10-PCS | Mod: PBBFAC,,, | Performed by: INTERNAL MEDICINE

## 2019-06-25 PROCEDURE — 3079F PR MOST RECENT DIASTOLIC BLOOD PRESSURE 80-89 MM HG: ICD-10-PCS | Mod: CPTII,S$GLB,, | Performed by: INTERNAL MEDICINE

## 2019-06-25 PROCEDURE — 99396 PREV VISIT EST AGE 40-64: CPT | Mod: S$GLB,,, | Performed by: INTERNAL MEDICINE

## 2019-06-25 PROCEDURE — 3074F SYST BP LT 130 MM HG: CPT | Mod: CPTII,S$GLB,, | Performed by: INTERNAL MEDICINE

## 2019-06-25 PROCEDURE — 99396 PR PREVENTIVE VISIT,EST,40-64: ICD-10-PCS | Mod: S$GLB,,, | Performed by: INTERNAL MEDICINE

## 2019-06-25 PROCEDURE — 81003 URINALYSIS AUTO W/O SCOPE: CPT

## 2019-06-25 PROCEDURE — 3079F DIAST BP 80-89 MM HG: CPT | Mod: CPTII,S$GLB,, | Performed by: INTERNAL MEDICINE

## 2019-06-25 NOTE — PATIENT INSTRUCTIONS
Back Exercises: Hip Rotator Stretch    To start, lie on your back with your knees bent and feet flat on the floor. Dont press your neck or lower back to the floor. Breathe deeply. You should feel comfortable and relaxed in this position.  · Rest your right ankle on your left knee.  · Place a towel behind your left thigh, and use it to pull the knee toward your chest. Feel the stretch in your buttocks.  · Hold for 30 to 60 seconds. Release.  · Repeat 2 times.  · Switch legs.   · If there is any pain other than stretch in the knee or buttock, stop and contact your healthcare provider.  For your safety, check with your healthcare provider before starting an exercise program.   Date Last Reviewed: 8/16/2015  © 3904-9074 [employer]. [address] [place] [address]. All rights reserved. This information is not intended as a substitute for professional medical care. Always follow your healthcare professional's instructions.        Back Exercises: Lower Back Stretch    To start, sit in a chair with your feet flat on the floor. Shift your weight slightly forward. Relax, and keep your ears, shoulders, and hips aligned.  · Sit with your feet well apart.  · Bend forward and touch the floor with the backs of your hands. Relax and let your body drop.  · Hold for 20 seconds. Return to starting position.  · Repeat 2 times.   Date Last Reviewed: 8/16/2015  © 2011-6183 [employer]. [address] [place] [address]. All rights reserved. This information is not intended as a substitute for professional medical care. Always follow your healthcare professional's instructions.        Back Exercises: Side Stretch      To start, sit in a chair with your feet flat on the floor. Shift your weight slightly forward to avoid rounding your back. Relax. Keep your ears, shoulders, and hips aligned:  · Stretch your right arm overhead.  · Slowly bend to the left. Dont twist your torso. Stay within  your pain limits.  · Hold for 20 seconds. Return to starting position.  · Repeat 2 to 5 times. Then, switch to the other side.  Date Last Reviewed: 10/13/2015  © 2166-6797 [employer]. [address] [place] [address]. All rights reserved. This information is not intended as a substitute for professional medical care. Always follow your healthcare professional's instructions.        Lumbar Stretch (Flexibility)    1. Lie on your back on the floor, with your knees bent and your feet flat on the floor. Dont press your neck or lower back to the floor.  2. Pull one knee up toward your chest. Clasp your hands under your thigh to help pull.  3. Hold for 30 to 60 seconds. Lower your leg back down to the floor.  4. Repeat 2 times, or as instructed.  5. Switch legs and repeat.  Date Last Reviewed: 3/10/2016  © 4486-4442 [employer]. [address] [place] [address]. All rights reserved. This information is not intended as a substitute for professional medical care. Always follow your healthcare professional's instructions.

## 2019-06-25 NOTE — TELEPHONE ENCOUNTER
Returned the patient call regarding the message below.  The patient is scheduled to be seen on Tuesday 7/2/19 at 11 am with Ryan Rhoades NP.  The patient voiced understanding of appointment date, time, and location.  Reminder letter mailed to the patient.

## 2019-06-25 NOTE — TELEPHONE ENCOUNTER
----- Message from Ita Gonzalez sent at 6/25/2019 10:53 AM CDT -----  Contact: pt  Needs Advice    Reason for call: pt calling to speak with someone in regards to scheduling an appt. Pt has a family history of breast cancer.         Communication Preference: [phone]     Additional Information:

## 2019-06-25 NOTE — PROGRESS NOTES
Subjective:       Patient ID: Buffy Morin is a 58 y.o. female.    Chief Complaint: Annual Exam    Annual exam    Meds helping, sleep OK, stress better.    BP doing well.    Walking and stationery bike.    Still with R shoulder pain despite injection and PT, she thinks she needs to return to Ortho.    Some back pain/muscle spasm for a few weeks.  Not daily    Due for colonoscopy as well.    Deliberate weight loss.    Patient Active Problem List:     Anxiety     Carpal tunnel syndrome     Mild vitamin D deficiency     Mixed hyperlipidemia     Family history of breast cancer in sister     Essential hypertension     S/P hysterectomy     At high risk for breast cancer: TC 7 > 20% 2019      Review of Systems   Constitutional: Negative for activity change, appetite change, chills, fatigue and fever.   HENT: Negative for congestion, hearing loss, sinus pressure and sore throat.    Eyes: Negative for visual disturbance.   Respiratory: Negative for apnea, cough, shortness of breath and wheezing.    Cardiovascular: Negative for chest pain, palpitations and leg swelling.   Gastrointestinal: Negative for abdominal distention, abdominal pain, constipation, diarrhea, nausea and vomiting.        No change in bowels  No constipation  No diarrhea or vomiting    Occasional vague abdominal discomfort, not related to eating.    No blood in the stool or black tarry stools.     Genitourinary: Negative for difficulty urinating, dysuria, frequency, hematuria and vaginal bleeding.   Musculoskeletal: Positive for back pain. Negative for gait problem, joint swelling and myalgias.        Stiff and spasm of back in the morning on occasion  Better as the day goes on-  Never wakes her up from sleep  Started sleeping in other room-and that is better; she thinks it may be mattress     Skin: Negative for rash.   Neurological: Negative for dizziness, weakness, light-headedness, numbness and headaches.   Hematological: Negative for adenopathy. Does  not bruise/bleed easily.   Psychiatric/Behavioral: Negative for confusion, hallucinations, sleep disturbance and suicidal ideas.       Objective:      Physical Exam   Constitutional: She is oriented to person, place, and time. She appears well-developed and well-nourished.   HENT:   Head: Normocephalic and atraumatic.   Right Ear: External ear normal.   Left Ear: External ear normal.   Nose: Nose normal.   Mouth/Throat: Oropharynx is clear and moist. No oropharyngeal exudate.   Eyes: Conjunctivae and EOM are normal. No scleral icterus.   Neck: Normal range of motion. Neck supple. No JVD present. No thyromegaly present.   Cardiovascular: Normal rate, regular rhythm, normal heart sounds and intact distal pulses. Exam reveals no gallop.   No murmur heard.  Pulmonary/Chest: Effort normal and breath sounds normal. No respiratory distress. She has no wheezes.   Abdominal: Soft. Bowel sounds are normal. She exhibits no distension and no mass. There is no tenderness. There is no rebound and no guarding.   Musculoskeletal: Normal range of motion. She exhibits no edema or tenderness.   No CVA pain.  Negative SLR.  Strength UE and LE wnl.  No pain over spine   Lymphadenopathy:     She has no cervical adenopathy.   Neurological: She is alert and oriented to person, place, and time. She displays normal reflexes. No cranial nerve deficit. Coordination normal.   Skin: Skin is warm and dry. No rash noted. No erythema.   Psychiatric: She has a normal mood and affect. Her behavior is normal. Judgment and thought content normal.   Nursing note and vitals reviewed.      Assessment:       1. Annual physical exam    2. Essential hypertension    3. Mixed hyperlipidemia    4. Colon cancer screening    5. At high risk for breast cancer: TC 7 > 20% 2019    6. Family history of breast cancer in sister    7. Abdominal pain, unspecified abdominal location        Plan:         Buffy was seen today for annual exam.    Diagnoses and all orders  for this visit:    Annual physical exam  -     Urinalysis    Essential hypertension; continue regimen    Mixed hyperlipidemia:  Continue regimen    Colon cancer screening  -     Case request GI: COLONOSCOPY    At high risk for breast cancer: TC 7 > 20% 2019  -     Ambulatory Referral to Breast Surgery    Family history of breast cancer in sister  -     Ambulatory Referral to Breast Surgery    Abdominal pain, unspecified abdominal location  -     CT Abdomen Pelvis With Contrast; Future  -     Creatinine, serum; Future     Natural history of back pain reviewed   Avoid prolonged sitting   Ice, Tylenol, follow-up will results, symptoms seem to be better since switching to a different bed    I will review all studies and determine further tx depending on findings

## 2019-06-28 ENCOUNTER — HOSPITAL ENCOUNTER (OUTPATIENT)
Dept: RADIOLOGY | Facility: HOSPITAL | Age: 59
Discharge: HOME | End: 2019-06-28
Attending: INTERNAL MEDICINE
Payer: COMMERCIAL

## 2019-06-28 DIAGNOSIS — R10.9 ABDOMINAL PAIN, UNSPECIFIED ABDOMINAL LOCATION: ICD-10-CM

## 2019-06-28 PROCEDURE — 25500020 PHARM REV CODE 255: Performed by: INTERNAL MEDICINE

## 2019-06-28 PROCEDURE — 74177 CT ABDOMEN PELVIS WITH CONTRAST: ICD-10-PCS | Mod: 26,,, | Performed by: RADIOLOGY

## 2019-06-28 PROCEDURE — 74177 CT ABD & PELVIS W/CONTRAST: CPT | Mod: TC

## 2019-06-28 PROCEDURE — 74177 CT ABD & PELVIS W/CONTRAST: CPT | Mod: 26,,, | Performed by: RADIOLOGY

## 2019-06-28 RX ADMIN — IOHEXOL 15 ML: 350 INJECTION, SOLUTION INTRAVENOUS at 03:06

## 2019-06-28 RX ADMIN — IOHEXOL 75 ML: 350 INJECTION, SOLUTION INTRAVENOUS at 04:06

## 2019-06-28 RX ADMIN — IOHEXOL 15 ML: 350 INJECTION, SOLUTION INTRAVENOUS at 02:06

## 2019-06-29 ENCOUNTER — TELEPHONE (OUTPATIENT)
Dept: INTERNAL MEDICINE | Facility: CLINIC | Age: 59
End: 2019-06-29

## 2019-07-02 ENCOUNTER — OFFICE VISIT (OUTPATIENT)
Dept: SURGERY | Facility: CLINIC | Age: 59
End: 2019-07-02
Payer: COMMERCIAL

## 2019-07-02 VITALS
DIASTOLIC BLOOD PRESSURE: 74 MMHG | TEMPERATURE: 99 F | BODY MASS INDEX: 29.71 KG/M2 | SYSTOLIC BLOOD PRESSURE: 127 MMHG | HEIGHT: 64 IN | HEART RATE: 87 BPM | WEIGHT: 174 LBS

## 2019-07-02 DIAGNOSIS — Z80.3 FAMILY HISTORY OF BREAST CANCER IN FIRST DEGREE RELATIVE: ICD-10-CM

## 2019-07-02 DIAGNOSIS — Z12.39 BREAST CANCER SCREENING: Primary | ICD-10-CM

## 2019-07-02 PROCEDURE — 3074F SYST BP LT 130 MM HG: CPT | Mod: CPTII,S$GLB,, | Performed by: NURSE PRACTITIONER

## 2019-07-02 PROCEDURE — 99999 PR PBB SHADOW E&M-EST. PATIENT-LVL III: ICD-10-PCS | Mod: PBBFAC,,, | Performed by: NURSE PRACTITIONER

## 2019-07-02 PROCEDURE — 99202 PR OFFICE/OUTPT VISIT, NEW, LEVL II, 15-29 MIN: ICD-10-PCS | Mod: S$GLB,,, | Performed by: NURSE PRACTITIONER

## 2019-07-02 PROCEDURE — 3008F PR BODY MASS INDEX (BMI) DOCUMENTED: ICD-10-PCS | Mod: CPTII,S$GLB,, | Performed by: NURSE PRACTITIONER

## 2019-07-02 PROCEDURE — 3078F DIAST BP <80 MM HG: CPT | Mod: CPTII,S$GLB,, | Performed by: NURSE PRACTITIONER

## 2019-07-02 PROCEDURE — 3078F PR MOST RECENT DIASTOLIC BLOOD PRESSURE < 80 MM HG: ICD-10-PCS | Mod: CPTII,S$GLB,, | Performed by: NURSE PRACTITIONER

## 2019-07-02 PROCEDURE — 3008F BODY MASS INDEX DOCD: CPT | Mod: CPTII,S$GLB,, | Performed by: NURSE PRACTITIONER

## 2019-07-02 PROCEDURE — 99999 PR PBB SHADOW E&M-EST. PATIENT-LVL III: CPT | Mod: PBBFAC,,, | Performed by: NURSE PRACTITIONER

## 2019-07-02 PROCEDURE — 99202 OFFICE O/P NEW SF 15 MIN: CPT | Mod: S$GLB,,, | Performed by: NURSE PRACTITIONER

## 2019-07-02 PROCEDURE — 3074F PR MOST RECENT SYSTOLIC BLOOD PRESSURE < 130 MM HG: ICD-10-PCS | Mod: CPTII,S$GLB,, | Performed by: NURSE PRACTITIONER

## 2019-07-02 NOTE — LETTER
July 2, 2019      Minoo Carrasquillo MD  [address]  [facility] [address]           [facility]  [address]  [facility] [address]  Phone: [phone]  Fax: [phone]          Patient: Buffy Morin   MR Number: [id number]   YOB: 1960   Date of Visit: 7/2/2019       Dear Dr. Minoo Carrasquillo:    Thank you for referring Buffy Morin to me for evaluation. Attached you will find relevant portions of my assessment and plan of care.    If you have questions, please do not hesitate to call me. I look forward to following Buffy Morin along with you.    Sincerely,    Ryan Rhoades, DNP    Enclosure  CC:  No Recipients    If you would like to receive this communication electronically, please contact [email or web address] or [phone] to request more information on [facility] access.    For providers and/or their staff who would like to refer a patient to [facility], please contact us through our one-stop-shop provider referral line, [facility], at [phone].    If you feel you have received this communication in error or would no longer like to receive these types of communications, please e-mail [email or web address]

## 2019-07-09 DIAGNOSIS — Z12.11 SPECIAL SCREENING FOR MALIGNANT NEOPLASMS, COLON: Primary | ICD-10-CM

## 2019-07-09 RX ORDER — POLYETHYLENE GLYCOL 3350, SODIUM SULFATE ANHYDROUS, SODIUM BICARBONATE, SODIUM CHLORIDE, POTASSIUM CHLORIDE 236; 22.74; 6.74; 5.86; 2.97 G/4L; G/4L; G/4L; G/4L; G/4L
4 POWDER, FOR SOLUTION ORAL ONCE
Qty: 4000 ML | Refills: 0 | Status: SHIPPED | OUTPATIENT
Start: 2019-07-09 | End: 2019-07-09

## 2019-08-02 RX ORDER — MELOXICAM 15 MG/1
15 TABLET ORAL DAILY
Qty: 30 TABLET | Refills: 1 | Status: SHIPPED | OUTPATIENT
Start: 2019-08-02 | End: 2019-08-27 | Stop reason: SDUPTHER

## 2019-08-05 ENCOUNTER — TELEPHONE (OUTPATIENT)
Dept: INTERNAL MEDICINE | Facility: CLINIC | Age: 59
End: 2019-08-05

## 2019-08-05 ENCOUNTER — OFFICE VISIT (OUTPATIENT)
Dept: ORTHOPEDICS | Facility: CLINIC | Age: 59
End: 2019-08-05
Payer: COMMERCIAL

## 2019-08-05 ENCOUNTER — HOSPITAL ENCOUNTER (OUTPATIENT)
Dept: RADIOLOGY | Facility: HOSPITAL | Age: 59
Discharge: HOME | End: 2019-08-05
Attending: PHYSICIAN ASSISTANT
Payer: COMMERCIAL

## 2019-08-05 ENCOUNTER — TELEPHONE (OUTPATIENT)
Dept: ORTHOPEDICS | Facility: CLINIC | Age: 59
End: 2019-08-05

## 2019-08-05 VITALS
BODY MASS INDEX: 31.56 KG/M2 | SYSTOLIC BLOOD PRESSURE: 139 MMHG | DIASTOLIC BLOOD PRESSURE: 80 MMHG | HEART RATE: 74 BPM | WEIGHT: 178.13 LBS | HEIGHT: 63 IN

## 2019-08-05 DIAGNOSIS — M54.2 NECK PAIN: Primary | ICD-10-CM

## 2019-08-05 DIAGNOSIS — M54.2 NECK PAIN: ICD-10-CM

## 2019-08-05 DIAGNOSIS — M51.36 DDD (DEGENERATIVE DISC DISEASE), LUMBAR: Primary | ICD-10-CM

## 2019-08-05 DIAGNOSIS — M47.22 OSTEOARTHRITIS OF SPINE WITH RADICULOPATHY, CERVICAL REGION: ICD-10-CM

## 2019-08-05 PROCEDURE — 99213 PR OFFICE/OUTPT VISIT, EST, LEVL III, 20-29 MIN: ICD-10-PCS | Mod: S$GLB,,, | Performed by: PHYSICIAN ASSISTANT

## 2019-08-05 PROCEDURE — 99999 PR PBB SHADOW E&M-EST. PATIENT-LVL III: ICD-10-PCS | Mod: PBBFAC,,, | Performed by: PHYSICIAN ASSISTANT

## 2019-08-05 PROCEDURE — 3079F PR MOST RECENT DIASTOLIC BLOOD PRESSURE 80-89 MM HG: ICD-10-PCS | Mod: CPTII,S$GLB,, | Performed by: PHYSICIAN ASSISTANT

## 2019-08-05 PROCEDURE — 3008F BODY MASS INDEX DOCD: CPT | Mod: CPTII,S$GLB,, | Performed by: PHYSICIAN ASSISTANT

## 2019-08-05 PROCEDURE — 72050 X-RAY EXAM NECK SPINE 4/5VWS: CPT | Mod: 26,,, | Performed by: RADIOLOGY

## 2019-08-05 PROCEDURE — 99999 PR PBB SHADOW E&M-EST. PATIENT-LVL III: CPT | Mod: PBBFAC,,, | Performed by: PHYSICIAN ASSISTANT

## 2019-08-05 PROCEDURE — 3075F PR MOST RECENT SYSTOLIC BLOOD PRESS GE 130-139MM HG: ICD-10-PCS | Mod: CPTII,S$GLB,, | Performed by: PHYSICIAN ASSISTANT

## 2019-08-05 PROCEDURE — 3008F PR BODY MASS INDEX (BMI) DOCUMENTED: ICD-10-PCS | Mod: CPTII,S$GLB,, | Performed by: PHYSICIAN ASSISTANT

## 2019-08-05 PROCEDURE — 3079F DIAST BP 80-89 MM HG: CPT | Mod: CPTII,S$GLB,, | Performed by: PHYSICIAN ASSISTANT

## 2019-08-05 PROCEDURE — 72050 XR CERVICAL SPINE COMPLETE 5 VIEW: ICD-10-PCS | Mod: 26,,, | Performed by: RADIOLOGY

## 2019-08-05 PROCEDURE — 99213 OFFICE O/P EST LOW 20 MIN: CPT | Mod: S$GLB,,, | Performed by: PHYSICIAN ASSISTANT

## 2019-08-05 PROCEDURE — 72050 X-RAY EXAM NECK SPINE 4/5VWS: CPT | Mod: TC

## 2019-08-05 PROCEDURE — 3075F SYST BP GE 130 - 139MM HG: CPT | Mod: CPTII,S$GLB,, | Performed by: PHYSICIAN ASSISTANT

## 2019-08-05 RX ORDER — METHYLPREDNISOLONE 4 MG/1
TABLET ORAL
Qty: 1 PACKAGE | Refills: 0 | Status: SHIPPED | OUTPATIENT
Start: 2019-08-05 | End: 2019-11-18

## 2019-08-05 NOTE — TELEPHONE ENCOUNTER
----- Message from Dee Montana sent at 8/5/2019  8:03 AM CDT -----  Contact: self/[phone]  Pt called in regards to getting her results from her Ct scan and lab that was done 6-28-19.      Please advise

## 2019-08-06 ENCOUNTER — HOSPITAL ENCOUNTER (OUTPATIENT)
Dept: RADIOLOGY | Facility: HOSPITAL | Age: 59
Discharge: HOME | End: 2019-08-06
Attending: PHYSICIAN ASSISTANT
Payer: COMMERCIAL

## 2019-08-06 ENCOUNTER — OFFICE VISIT (OUTPATIENT)
Dept: ORTHOPEDICS | Facility: CLINIC | Age: 59
End: 2019-08-06
Payer: COMMERCIAL

## 2019-08-06 VITALS — HEIGHT: 63 IN | WEIGHT: 178 LBS | BODY MASS INDEX: 31.54 KG/M2

## 2019-08-06 DIAGNOSIS — M43.16 SPONDYLOLISTHESIS OF LUMBAR REGION: Primary | ICD-10-CM

## 2019-08-06 DIAGNOSIS — M51.36 DDD (DEGENERATIVE DISC DISEASE), LUMBAR: ICD-10-CM

## 2019-08-06 DIAGNOSIS — M54.50 ACUTE BILATERAL LOW BACK PAIN WITHOUT SCIATICA: ICD-10-CM

## 2019-08-06 DIAGNOSIS — M54.12 CERVICAL RADICULOPATHY: ICD-10-CM

## 2019-08-06 DIAGNOSIS — M25.512 CHRONIC PAIN OF BOTH SHOULDERS: ICD-10-CM

## 2019-08-06 DIAGNOSIS — M25.511 CHRONIC PAIN OF BOTH SHOULDERS: ICD-10-CM

## 2019-08-06 DIAGNOSIS — G89.29 CHRONIC PAIN OF BOTH SHOULDERS: ICD-10-CM

## 2019-08-06 PROCEDURE — 3008F BODY MASS INDEX DOCD: CPT | Mod: CPTII,S$GLB,, | Performed by: PHYSICIAN ASSISTANT

## 2019-08-06 PROCEDURE — 72120 X-RAY BEND ONLY L-S SPINE: CPT | Mod: 26,,, | Performed by: RADIOLOGY

## 2019-08-06 PROCEDURE — 99999 PR PBB SHADOW E&M-EST. PATIENT-LVL III: CPT | Mod: PBBFAC,,, | Performed by: PHYSICIAN ASSISTANT

## 2019-08-06 PROCEDURE — 72100 XR LUMBAR SPINE AP AND LAT WITH FLEX/EXT: ICD-10-PCS | Mod: 26,,, | Performed by: RADIOLOGY

## 2019-08-06 PROCEDURE — 99214 PR OFFICE/OUTPT VISIT, EST, LEVL IV, 30-39 MIN: ICD-10-PCS | Mod: S$GLB,,, | Performed by: PHYSICIAN ASSISTANT

## 2019-08-06 PROCEDURE — 72120 XR LUMBAR SPINE AP AND LAT WITH FLEX/EXT: ICD-10-PCS | Mod: 26,,, | Performed by: RADIOLOGY

## 2019-08-06 PROCEDURE — 99999 PR PBB SHADOW E&M-EST. PATIENT-LVL III: ICD-10-PCS | Mod: PBBFAC,,, | Performed by: PHYSICIAN ASSISTANT

## 2019-08-06 PROCEDURE — 72100 X-RAY EXAM L-S SPINE 2/3 VWS: CPT | Mod: 26,,, | Performed by: RADIOLOGY

## 2019-08-06 PROCEDURE — 3008F PR BODY MASS INDEX (BMI) DOCUMENTED: ICD-10-PCS | Mod: CPTII,S$GLB,, | Performed by: PHYSICIAN ASSISTANT

## 2019-08-06 PROCEDURE — 99214 OFFICE O/P EST MOD 30 MIN: CPT | Mod: S$GLB,,, | Performed by: PHYSICIAN ASSISTANT

## 2019-08-06 PROCEDURE — 72120 X-RAY BEND ONLY L-S SPINE: CPT | Mod: TC

## 2019-08-06 RX ORDER — METHOCARBAMOL 750 MG/1
750 TABLET, FILM COATED ORAL 3 TIMES DAILY PRN
Qty: 60 TABLET | Refills: 0 | Status: SHIPPED | OUTPATIENT
Start: 2019-08-06 | End: 2019-08-22 | Stop reason: SDUPTHER

## 2019-08-06 NOTE — TELEPHONE ENCOUNTER
----- Message from Dee Verma sent at 8/6/2019 10:26 AM CDT -----  Contact: patient [phone]  Patient would like to get test results  Name of test  Labs/ ct scan  Date of test:  06/25 and 6/28/19  Ordering provider:   Where was the test performed:  [facility]/ imaging center  Would the patient rather a call back or a response via [facility]?:  Call   Comments:

## 2019-08-06 NOTE — PROGRESS NOTES
SUBJECTIVE:     Chief Complaint & History of Present Illness:  Buffy Morin is a  Established  patient 58 y.o. female who is seen here today with a complaint of  bilateral shoulder pain .  She is a patient well-known to me was last seen treated by me in the clinic for this condition 07/12/2018 at which time we had seen her for right shoulder pain.  She had undergone a cortisone injection followed by a course of physical therapy but relates she really got very little relief from these modalities.  She returns today complaining of pain in bilateral shoulders radiating down the arms and forearms.  She cannot relate this to any specific activity or movements states that it bothers her quite often at night causes disruption of sleep.   On a scale of 1-10, with 10 being worst pain imaginable, he rates this pain as 3 on good days and 8 on bad days.  she describes the pain as tender and sore.    Review of patient's allergies indicates:   Allergen Reactions    No known drug allergies          Current Outpatient Medications   Medication Sig Dispense Refill    amLODIPine (NORVASC) 5 MG tablet TAKE 1 TABLET (5 MG TOTAL) BY MOUTH ONCE DAILY. 90 tablet 2    atorvastatin (LIPITOR) 20 MG tablet TAKE 1 TABLET BY MOUTH EVERY DAY 90 tablet 3    cholecalciferol, vitamin D3, (VITAMIN D) 2,000 unit Cap Take 1 capsule by mouth Daily. Take vitamin D 2000 units every day.      escitalopram oxalate (LEXAPRO) 10 MG tablet Take 1 tablet (10 mg total) by mouth once daily. 90 tablet 3    meloxicam (MOBIC) 15 MG tablet TAKE 1 TABLET (15 MG TOTAL) BY MOUTH ONCE DAILY. 30 tablet 1    LORazepam (ATIVAN) 0.5 MG tablet Take 1 tablet (0.5 mg total) by mouth daily as needed for Anxiety (as needed for anxiety no more than 2-3 x weekly). 30 tablet 1    methocarbamol (ROBAXIN) 750 MG Tab Take 1 tablet (750 mg total) by mouth 3 (three) times daily as needed. As needed for muscle spasms 60 tablet 0    methylPREDNISolone (MEDROL DOSEPACK) 4 mg  "tablet use as directed 1 Package 0     No current facility-administered medications for this visit.        Past Medical History:   Diagnosis Date    Abnormal Pap smear of cervix     Anxiety     At high risk for breast cancer: TC 7 > 20% 2019 2/6/2019    Carpal tunnel syndrome     Elevated blood pressure     Family history of breast cancer in sister 10/1/2013    Hypertension     S/P hysterectomy 9/23/2017       Past Surgical History:   Procedure Laterality Date    BREAST BIOPSY Right     2007    HYSTERECTOMY      Total    TUBAL LIGATION         Vital Signs (Most Recent)  Vitals:    08/05/19 1611   BP: 139/80   Pulse: 74       Review of Systems:  ROS:  Constitutional: no fever or chills  Eyes: no visual changes  ENT: no nasal congestion or sore throat  Respiratory: no cough or shortness of breath  Cardiovascular: no chest pain or palpitations  Gastrointestinal: no nausea or vomiting, tolerating diet  Genitourinary: no hematuria or dysuria  Integument/Breast: no rash or pruritis  Hematologic/Lymphatic: no easy bruising or lymphadenopathy  Musculoskeletal: no arthralgias or myalgias  Neurological: no seizures or tremors  Behavioral/Psych: no auditory or visual hallucinations, Positive for anxiety  Endocrine: no heat or cold intolerance         OBJECTIVE:     PHYSICAL EXAM:  Height: 5' 3" (160 cm) Weight: 80.8 kg (178 lb 2.1 oz), General Appearance: Well nourished, well developed, in no acute distress.  Neurological: Mood & affect are normal.  Shoulder exam: bilateral  Tenderness: none, global soreness over bilateral shoulders radiating down to the forearms without point tenderness  ROM: forward flexion 180/180, extension 45/45, full abduction 180/180, abduction-glenohumeral 90/90, external rotation 50/50  Shoulder Strength: biceps 5/5, triceps 5/5, abduction 5/5, adduction 5/5, external rotation 5/5 with shoulder at side, flexion 5/5, and extension 5/5  negative for tenderness about the glenohumeral joint, " negative for tenderness over the acromioclavicular joint and negative for impingement sign  Stability tests: anterior apprehension test negative and posterior apprehension test negative  Special Tests:Cross-chest abduction: diffuse pain                     RADIOGRAPHS:  X-rays of the shoulders from previous visit demonstrate mild evidence of calcific tendinitis in the right shoulder joint spaces are well maintained  X-rays of the cervical spine taken today films reviewed by me demonstrate moderate degenerative disc disease and joint disease throughout the cervical spine with significant narrowing between C3 and C7    ASSESSMENT/PLAN:       ICD-10-CM ICD-9-CM   1. Neck pain M54.2 723.1   2. Osteoarthritis of spine with radiculopathy, cervical region M47.22 721.0       Plan: We discussed with the patient at length all the different treatment options available for her bilateralshoulder including anti-inflammatories, acetaminophen, rest, ice, Physical therapy to include strengthening exercise, occasional cortisone injections for temporary relief, arthroscopic surgical repair, and finally shoulder arthroplasty.   Based on patient's excellent function and distribution of symptoms he appears to be emanating from her neck.  She is scheduled to see Spine service tomorrow for evaluation of lumbar spine.  We will a head cervical spine evaluation to this visit.  Patient started on a Medrol Dosepak today to alleviate her immediate issues.

## 2019-08-06 NOTE — PROGRESS NOTES
DATE: 8/6/2019  PATIENT: Buffy Morin    Supervising Physician: Eric Dodd M.D.    CHIEF COMPLAINT: back pain    HISTORY:  Buffy Morin is a 58 y.o. female [facility] employee here for initial evaluation of low back pain (Back - 6). The pain has been present for 1.5 months. The patient describes the pain as nagging and spasms.  The pain is worse with lying down and in the morning and improved by ibuprofen and pain patches. There is no associated numbness and tingling. There is no subjective weakness. Prior treatments have included ibuprofen and pain patches, but no physical therapy, ESIs or surgery.  She also reports bilateral arm pain, no neck pain.  She saw Rodrigo for this yesterday.  He prescribed a steroid pack.  She denies any numbness or tingling.  No weakness.  She has had physical therapy and injections in the shoulder with little relief.     The patient denies myelopathic symptoms such as handwriting changes or difficulty with buttons/coins/keys. Denies perineal paresthesias, bowel/bladder dysfunction.    PAST MEDICAL/SURGICAL HISTORY:  Past Medical History:   Diagnosis Date    Abnormal Pap smear of cervix     Anxiety     At high risk for breast cancer: TC 7 > 20% 2019 2/6/2019    Carpal tunnel syndrome     Elevated blood pressure     Family history of breast cancer in sister 10/1/2013    Hypertension     S/P hysterectomy 9/23/2017     Past Surgical History:   Procedure Laterality Date    BREAST BIOPSY Right     2007    HYSTERECTOMY      Total    TUBAL LIGATION         Current Medications:   Current Outpatient Medications:     amLODIPine (NORVASC) 5 MG tablet, TAKE 1 TABLET (5 MG TOTAL) BY MOUTH ONCE DAILY., Disp: 90 tablet, Rfl: 2    atorvastatin (LIPITOR) 20 MG tablet, TAKE 1 TABLET BY MOUTH EVERY DAY, Disp: 90 tablet, Rfl: 3    cholecalciferol, vitamin D3, (VITAMIN D) 2,000 unit Cap, Take 1 capsule by mouth Daily. Take vitamin D 2000 units every day., Disp: , Rfl:     escitalopram  oxalate (LEXAPRO) 10 MG tablet, Take 1 tablet (10 mg total) by mouth once daily., Disp: 90 tablet, Rfl: 3    meloxicam (MOBIC) 15 MG tablet, TAKE 1 TABLET (15 MG TOTAL) BY MOUTH ONCE DAILY., Disp: 30 tablet, Rfl: 1    methylPREDNISolone (MEDROL DOSEPACK) 4 mg tablet, use as directed, Disp: 1 Package, Rfl: 0    LORazepam (ATIVAN) 0.5 MG tablet, Take 1 tablet (0.5 mg total) by mouth daily as needed for Anxiety (as needed for anxiety no more than 2-3 x weekly)., Disp: 30 tablet, Rfl: 1    methocarbamol (ROBAXIN) 750 MG Tab, Take 1 tablet (750 mg total) by mouth 3 (three) times daily as needed. As needed for muscle spasms, Disp: 60 tablet, Rfl: 0    Social History:   Social History     Socioeconomic History    Marital status:      Spouse name: Not on file    Number of children: 2    Years of education: Not on file    Highest education level: Not on file   Occupational History    Not on file   Social Needs    Financial resource strain: Not on file    Food insecurity:     Worry: Not on file     Inability: Not on file    Transportation needs:     Medical: Not on file     Non-medical: Not on file   Tobacco Use    Smoking status: Never Smoker    Smokeless tobacco: Never Used   Substance and Sexual Activity    Alcohol use: Yes     Comment: rare    Drug use: No    Sexual activity: Yes   Lifestyle    Physical activity:     Days per week: Not on file     Minutes per session: Not on file    Stress: Only a little   Relationships    Social connections:     Talks on phone: Not on file     Gets together: Not on file     Attends [employer] service: Not on file     Active member of club or organization: Not on file     Attends meetings of clubs or organizations: Not on file     Relationship status: Not on file   Other Topics Concern    Not on file   Social History Narrative    Not on file       REVIEW OF SYSTEMS:  Constitution: Negative. Negative for chills, fever and night sweats.   Cardiovascular:  "Negative for chest pain and syncope.   Respiratory: Negative for cough and shortness of breath.   Gastrointestinal: See HPI. Negative for nausea/vomiting. Negative for abdominal pain.  Genitourinary: See HPI. Negative for discoloration or dysuria.  Skin: Negative for dry skin, itching and rash.   Hematologic/Lymphatic: Negative for bleeding problem. Does not bruise/bleed easily.   Musculoskeletal: Negative for falls and muscle weakness.   Neurological: See HPI. No seizures.   Endocrine: Negative for polydipsia, polyphagia and polyuria.   Allergic/Immunologic: Negative for hives and persistent infections.    PHYSICAL EXAMINATION:    Ht 5' 3" (1.6 m)   Wt 80.7 kg (178 lb)   BMI 31.53 kg/m²     General: The patient is a very pleasant 58 y.o. female in no apparent distress, the patient is oriented to person, place and time.   Psych: Normal mood and affect  HEENT: Vision grossly intact, hearing intact to the spoken word.  Lungs: Respirations unlabored.  Gait: Normal station and gait, no difficulty with toe or heel walk.   Skin: Dorsal lumbar skin negative for rashes, lesions, hairy patches and surgical scars.  Range of motion: Lumbar range of motion is acceptable. There is mild lumbar tenderness to palpation.  There is no cervical tenderness to palpation.   Spinal Balance: Global saggital and coronal spinal balance acceptable, no significant for scoliosis and kyphosis.  Musculoskeletal: No pain with the range of motion of the bilateral hips. No trochanteric tenderness to palpation.  Vascular: Bilateral lower extremities warm and well perfused, Dorsalis pedis pulses 2+ bilaterally.  Neurological: Normal strength and tone in all major motor groups in the bilateral upper and lower extremities. Normal sensation to light touch in the C5-T1 and L2-S1 dermatomes bilaterally.  Deep tendon reflexes symmetric 2+ in the bilateral upper and lower extremities.  Negative Babinski bilaterally.  Straight leg raise negative " bilaterally. Negative inverted radial reflex and constantino's bilaterally.     IMAGING:   Today I personally reviewed AP, Lat and Flex/Ex upright L-spine films that demonstrate grade I anterolisthesis of L4/5.    Cervical films show moderate C4/5 disc degeneration.      Body mass index is 31.53 kg/m².    Hemoglobin A1C   Date Value Ref Range Status   04/02/2019 6.2 (H) 4.0 - 5.6 % Final     Comment:     ADA Screening Guidelines:  5.7-6.4%  Consistent with prediabetes  >or=6.5%  Consistent with diabetes  High levels of fetal hemoglobin interfere with the HbA1C  assay. Heterozygous hemoglobin variants (HbS, HgC, etc)do  not significantly interfere with this assay.   However, presence of multiple variants may affect accuracy.     09/22/2017 6.1 (H) 4.0 - 5.6 % Final     Comment:     According to ADA guidelines, hemoglobin A1c <7.0% represents  optimal control in non-pregnant diabetic patients. Different  metrics may apply to specific patient populations.   Standards of Medical Care in Diabetes-2016.  For the purpose of screening for the presence of diabetes:  <5.7%     Consistent with the absence of diabetes  5.7-6.4%  Consistent with increasing risk for diabetes   (prediabetes)  >or=6.5%  Consistent with diabetes  Currently, no consensus exists for use of hemoglobin A1c  for diagnosis of diabetes for children.  This Hemoglobin A1c assay has significant interference with fetal   hemoglobin   (HbF). The results are invalid for patients with abnormal amounts of   HbF,   including those with known Hereditary Persistence   of Fetal Hemoglobin. Heterozygous hemoglobin variants (HbAS, HbAC,   HbAD, HbAE, HbA2) do not significantly interfere with this assay;   however, presence of multiple variants in a sample may impact the %   interference.           ASSESSMENT/PLAN:    Buffy was seen today for low-back pain.    Diagnoses and all orders for this visit:    Spondylolisthesis of lumbar region    Acute bilateral low back pain  without sciatica    Chronic pain of both shoulders    Cervical radiculopathy    Other orders  -     methocarbamol (ROBAXIN) 750 MG Tab; Take 1 tablet (750 mg total) by mouth 3 (three) times daily as needed. As needed for muscle spasms      Today we discussed at length all of the different treatment options including anti-inflammatories, acetaminophen, rest, ice, heat, physical therapy including strengthening and stretching exercises, home exercises, ROM, aerobic conditioning, aqua therapy, other modalities including ultrasound, massage, and dry needling, epidural steroid injections and finally surgical intervention.        The patient saw Rodrigo yesterday and was given a steroid pack and mobic.  She will complete the steroid pack before taking mobic.  She will follow up with me in 2-3 weeks if symptoms persist.  We may consider an MRI at that time depending on her symptoms.       Follow up if symptoms worsen or fail to improve.

## 2019-08-07 ENCOUNTER — TELEPHONE (OUTPATIENT)
Dept: INTERNAL MEDICINE | Facility: CLINIC | Age: 59
End: 2019-08-07

## 2019-08-07 NOTE — TELEPHONE ENCOUNTER
----- Message from Jeferson Sam sent at 8/7/2019  1:14 PM CDT -----  Contact: [phone]  Patient stated she been leaving message since Monday to received her test result . Please call and advise, Thanks

## 2019-08-16 ENCOUNTER — TELEPHONE (OUTPATIENT)
Dept: ENDOSCOPY | Facility: HOSPITAL | Age: 59
End: 2019-08-16

## 2019-08-19 ENCOUNTER — HOSPITAL ENCOUNTER (OUTPATIENT)
Facility: HOSPITAL | Age: 59
Discharge: HOME | End: 2019-08-19
Attending: COLON & RECTAL SURGERY | Admitting: COLON & RECTAL SURGERY
Payer: COMMERCIAL

## 2019-08-19 ENCOUNTER — ANESTHESIA (OUTPATIENT)
Dept: ENDOSCOPY | Facility: HOSPITAL | Age: 59
End: 2019-08-19
Payer: COMMERCIAL

## 2019-08-19 ENCOUNTER — ANESTHESIA EVENT (OUTPATIENT)
Dept: ENDOSCOPY | Facility: HOSPITAL | Age: 59
End: 2019-08-19
Payer: COMMERCIAL

## 2019-08-19 VITALS
RESPIRATION RATE: 18 BRPM | BODY MASS INDEX: 29.88 KG/M2 | OXYGEN SATURATION: 100 % | HEIGHT: 64 IN | WEIGHT: 175 LBS | SYSTOLIC BLOOD PRESSURE: 121 MMHG | HEART RATE: 60 BPM | TEMPERATURE: 98 F | DIASTOLIC BLOOD PRESSURE: 76 MMHG

## 2019-08-19 DIAGNOSIS — Z12.11 ENCOUNTER FOR SCREENING COLONOSCOPY: Primary | ICD-10-CM

## 2019-08-19 PROCEDURE — 63600175 PHARM REV CODE 636 W HCPCS: Performed by: NURSE ANESTHETIST, CERTIFIED REGISTERED

## 2019-08-19 PROCEDURE — G0121 COLON CA SCRN NOT HI RSK IND: HCPCS | Performed by: COLON & RECTAL SURGERY

## 2019-08-19 PROCEDURE — E9220 PRA ENDO ANESTHESIA: HCPCS | Mod: ,,, | Performed by: NURSE ANESTHETIST, CERTIFIED REGISTERED

## 2019-08-19 PROCEDURE — G0121 COLON CA SCRN NOT HI RSK IND: ICD-10-PCS | Mod: ,,, | Performed by: COLON & RECTAL SURGERY

## 2019-08-19 PROCEDURE — G0121 COLON CA SCRN NOT HI RSK IND: HCPCS | Mod: ,,, | Performed by: COLON & RECTAL SURGERY

## 2019-08-19 PROCEDURE — 37000008 HC ANESTHESIA 1ST 15 MINUTES: Performed by: COLON & RECTAL SURGERY

## 2019-08-19 PROCEDURE — E9220 PRA ENDO ANESTHESIA: ICD-10-PCS | Mod: ,,, | Performed by: NURSE ANESTHETIST, CERTIFIED REGISTERED

## 2019-08-19 PROCEDURE — 63600175 PHARM REV CODE 636 W HCPCS: Performed by: COLON & RECTAL SURGERY

## 2019-08-19 PROCEDURE — 37000009 HC ANESTHESIA EA ADD 15 MINS: Performed by: COLON & RECTAL SURGERY

## 2019-08-19 RX ORDER — LIDOCAINE HCL/PF 100 MG/5ML
SYRINGE (ML) INTRAVENOUS
Status: DISCONTINUED | OUTPATIENT
Start: 2019-08-19 | End: 2019-08-19

## 2019-08-19 RX ORDER — PROPOFOL 10 MG/ML
VIAL (ML) INTRAVENOUS
Status: DISCONTINUED | OUTPATIENT
Start: 2019-08-19 | End: 2019-08-19

## 2019-08-19 RX ORDER — PROPOFOL 10 MG/ML
VIAL (ML) INTRAVENOUS CONTINUOUS PRN
Status: DISCONTINUED | OUTPATIENT
Start: 2019-08-19 | End: 2019-08-19

## 2019-08-19 RX ORDER — SODIUM CHLORIDE 9 MG/ML
INJECTION, SOLUTION INTRAVENOUS CONTINUOUS
Status: DISCONTINUED | OUTPATIENT
Start: 2019-08-19 | End: 2019-08-19 | Stop reason: HOSPADM

## 2019-08-19 RX ADMIN — LIDOCAINE HYDROCHLORIDE 100 MG: 20 INJECTION, SOLUTION INTRAVENOUS at 08:08

## 2019-08-19 RX ADMIN — SODIUM CHLORIDE: 0.9 INJECTION, SOLUTION INTRAVENOUS at 08:08

## 2019-08-19 RX ADMIN — PROPOFOL 100 MG: 10 INJECTION, EMULSION INTRAVENOUS at 08:08

## 2019-08-19 RX ADMIN — PROPOFOL 150 MCG/KG/MIN: 10 INJECTION, EMULSION INTRAVENOUS at 08:08

## 2019-08-19 NOTE — H&P
COLONOSCOPY HISTORY AND PHYSICAL EXAM    Procedure : Colonoscopy      INDICATIONS: asymptomatic screening exam    Family Hx of CRC: none    Last Colonoscopy:  2009  Findings: per patient was normal without polyps       Past Medical History:   Diagnosis Date    Abnormal Pap smear of cervix     Anxiety     At high risk for breast cancer: TC 7 > 20% 2019 2/6/2019    Carpal tunnel syndrome     Elevated blood pressure     Family history of breast cancer in sister 10/1/2013    Hypertension     S/P hysterectomy 9/23/2017     Sedation Problems: NO  Family History   Problem Relation Age of Onset    Cancer Mother         unknown origin    Hypertension Mother     Hypertension Father     Thyroid disease Sister     Breast cancer Sister         in her mid-60s, unilateral    Depression Sister     No Known Problems Son     No Known Problems Son     Ovarian cancer Neg Hx      Fam Hx of Sedation Problems: NO  Social History     Socioeconomic History    Marital status:      Spouse name: Not on file    Number of children: 2    Years of education: Not on file    Highest education level: Not on file   Occupational History    Not on file   Social Needs    Financial resource strain: Not on file    Food insecurity:     Worry: Not on file     Inability: Not on file    Transportation needs:     Medical: Not on file     Non-medical: Not on file   Tobacco Use    Smoking status: Never Smoker    Smokeless tobacco: Never Used   Substance and Sexual Activity    Alcohol use: Yes     Comment: rare    Drug use: No    Sexual activity: Yes   Lifestyle    Physical activity:     Days per week: Not on file     Minutes per session: Not on file    Stress: Only a little   Relationships    Social connections:     Talks on phone: Not on file     Gets together: Not on file     Attends [employer] service: Not on file     Active member of club or organization: Not on file     Attends meetings of clubs or organizations: Not  "on file     Relationship status: Not on file   Other Topics Concern    Not on file   Social History Narrative    Not on file       Review of Systems - Negative except   Respiratory ROS: no dyspnea  Cardiovascular ROS: no exertional chest pain  Gastrointestinal ROS: NO abdominal discomfort,  NO rectal bleeding  Musculoskeletal ROS: no muscular pain  Neurological ROS: no recent stroke    Physical Exam:  BP (!) 156/76 (BP Location: Left arm, Patient Position: Lying)   Pulse 72   Temp 98.1 °F (36.7 °C) (Temporal)   Resp 16   Ht 5' 4" (1.626 m)   Wt 79.4 kg (175 lb)   SpO2 100%   Breastfeeding? No   BMI 30.04 kg/m²   General: no distress  Head: normocephalic  Mallampati Score   Neck: supple, symmetrical, trachea midline  Lungs:  clear to auscultation bilaterally and normal respiratory effort  Heart: regular rate and rhythm and no murmur  Abdomen: soft, non-tender non-distented; bowel sounds normal; no masses,  no organomegaly  Extremities: no cyanosis or edema, or clubbing    ASA:  II    PLAN  COLONOSCOPY.    SedationPlan :MAC    The details of the procedure, the possible need for biopsy or polypectomy and the potential risks including bleeding, perforation, missed polyps were discussed in detail.    "

## 2019-08-19 NOTE — DISCHARGE INSTRUCTIONS

## 2019-08-19 NOTE — ANESTHESIA POSTPROCEDURE EVALUATION
Anesthesia Post Evaluation    Patient: Buffy Morin    Procedure(s) Performed: Procedure(s) (LRB):  COLONOSCOPY (N/A)    Final Anesthesia Type: general  Patient location during evaluation: PACU  Patient participation: Yes- Able to Participate  Level of consciousness: awake and alert and awake  Post-procedure vital signs: reviewed and stable  Pain management: adequate  Airway patency: patent  PONV status at discharge: No PONV  Anesthetic complications: no      Cardiovascular status: blood pressure returned to baseline  Respiratory status: unassisted and spontaneous ventilation  Hydration status: euvolemic  Follow-up not needed.          Vitals Value Taken Time   /76 8/19/2019  9:49 AM   Temp 36.5 °C (97.7 °F) 8/19/2019  9:25 AM   Pulse 60 8/19/2019  9:49 AM   Resp 18 8/19/2019  9:49 AM   SpO2 100 % 8/19/2019  9:49 AM         Event Time     Out of Recovery 09:55:33          Pain/Maria Isabel Score: Maria Isabel Score: 9 (8/19/2019  9:55 AM)

## 2019-08-19 NOTE — [employer] PATIENT INSTRUCTIONS
Discharge Summary/Instructions after an Endoscopic Procedure  Patient Name: Buffy Morin  Patient MRN: [id number]  Patient YOB: 1960  Monday, August 19, 2019  Kwabena Romero MD  RESTRICTIONS:  During your procedure today, you received medications for sedation.  These   medications may affect your judgment, balance and coordination.  Therefore,   for 24 hours, you have the following restrictions:   - DO NOT drive a car, operate machinery, make legal/financial decisions,   sign important papers or drink alcohol.    ACTIVITY:  Today: no heavy lifting, straining or running due to procedural   sedation/anesthesia.  The following day: return to full activity including work.  DIET:  Eat and drink normally unless instructed otherwise.     TREATMENT FOR COMMON SIDE EFFECTS:  - Mild abdominal pain, nausea, belching, bloating or excessive gas:  rest,   eat lightly and use a heating pad.  - Sore Throat: treat with throat lozenges and/or gargle with warm salt   water.  - Because air was used during the procedure, expelling large amounts of air   from your rectum or belching is normal.  - If a bowel prep was taken, you may not have a bowel movement for 1-3 days.    This is normal.  SYMPTOMS TO WATCH FOR AND REPORT TO YOUR PHYSICIAN:  1. Abdominal pain or bloating, other than gas cramps.  2. Chest pain.  3. Back pain.  4. Signs of infection such as: chills or fever occurring within 24 hours   after the procedure.  5. Rectal bleeding, which would show as bright red, maroon, or black stools.   (A tablespoon of blood from the rectum is not serious, especially if   hemorrhoids are present.)  6. Vomiting.  7. Weakness or dizziness.  GO DIRECTLY TO THE NEAREST EMERGENCY ROOM IF YOU HAVE ANY OF THE FOLLOWING:      Difficulty breathing              Chills and/or fever over 101 F   Persistent vomiting and/or vomiting blood   Severe abdominal pain   Severe chest pain   Black, tarry stools   Bleeding- more than one  tablespoon   Any other symptom or condition that you feel may need urgent attention  Your doctor recommends these additional instructions:  If any biopsies were taken, your doctors clinic will contact you in 1 to 2   weeks with any results.  - Discharge patient to home (ambulatory).   - Patient has a contact number available for emergencies.  The signs and   symptoms of potential delayed complications were discussed with the   patient.  Return to normal activities tomorrow.  Written discharge   instructions were provided to the patient.   - Resume previous diet.   - Continue present medications.   - Repeat colonoscopy in 10 years for screening purposes.  For questions, problems or results please call your physician - Kwabena Romero MD at Work:  [phone].  [facility], EMERGENCY ROOM PHONE NUMBER: [phone]  IF A COMPLICATION OR EMERGENCY SITUATION ARISES AND YOU ARE UNABLE TO REACH   YOUR PHYSICIAN - GO DIRECTLY TO THE EMERGENCY ROOM.  Kwabena Romero MD  8/19/2019 9:23:28 AM  This report has been verified and signed electronically.  [employer]

## 2019-08-19 NOTE — TRANSFER OF CARE
"Anesthesia Transfer of Care Note    Patient: Buffy Morin    Procedure(s) Performed: Procedure(s) (LRB):  COLONOSCOPY (N/A)    Patient location: PACU    Anesthesia Type: general    Transport from OR: Transported from OR on room air with adequate spontaneous ventilation    Post pain: adequate analgesia    Post assessment: no apparent anesthetic complications and tolerated procedure well    Post vital signs: stable    Level of consciousness: awake    Nausea/Vomiting: no nausea/vomiting    Complications: none    Transfer of care protocol was followed      Last vitals:   Visit Vitals  BP (!) 156/76 (BP Location: Left arm, Patient Position: Lying)   Pulse 72   Temp 36.7 °C (98.1 °F) (Temporal)   Resp 16   Ht 5' 4" (1.626 m)   Wt 79.4 kg (175 lb)   SpO2 100%   Breastfeeding? No   BMI 30.04 kg/m²     "

## 2019-08-19 NOTE — ANESTHESIA PREPROCEDURE EVALUATION
08/19/2019  Buffy Morin is a 58 y.o., female.  Past Medical History:   Diagnosis Date    Abnormal Pap smear of cervix     Anxiety     At high risk for breast cancer: TC 7 > 20% 2019 2/6/2019    Carpal tunnel syndrome     Elevated blood pressure     Family history of breast cancer in sister 10/1/2013    Hypertension     S/P hysterectomy 9/23/2017     Past Surgical History:   Procedure Laterality Date    BREAST BIOPSY Right     2007    HYSTERECTOMY      Total    TUBAL LIGATION         Anesthesia Evaluation    I have reviewed the Patient Summary Reports.     I have reviewed the Medications.     Review of Systems  Anesthesia Hx:  No problems with previous Anesthesia  Neg history of prior surgery.   Social:  Non-Smoker, Alcohol Use    Hematology/Oncology:  Hematology Normal   Oncology Normal     EENT/Dental:EENT/Dental Normal   Cardiovascular:   Exercise tolerance: good Hypertension, well controlled    Pulmonary:  Pulmonary Normal    Renal/:  Renal/ Normal     Hepatic/GI:  Hepatic/GI Normal    Musculoskeletal:  Musculoskeletal Normal    Neurological:   Neuromuscular Disease,    Endocrine:  Endocrine Normal    Dermatological:  Skin Normal    Psych:   anxiety          Physical Exam  General:  Well nourished, Obesity    Airway/Jaw/Neck:  Airway Findings: Mouth Opening: Normal Tongue: Normal  General Airway Assessment: Adult  Mallampati: II  Improves to I with phonation.  TM Distance: Normal, at least 6 cm         Dental:  DENTAL FINDINGS: Normal   Chest/Lungs:  Chest/Lungs Clear    Heart/Vascular:  Heart Findings: Normal       Mental Status:  Mental Status Findings: Normal        Anesthesia Plan  Type of Anesthesia, risks & benefits discussed:  Anesthesia Type:  general  Patient's Preference:   Intra-op Monitoring Plan: standard ASA monitors  Intra-op Monitoring Plan Comments:   Post Op Pain  Control Plan:   Post Op Pain Control Plan Comments:   Induction:   IV  Beta Blocker:  Patient is not currently on a Beta-Blocker (No further documentation required).       Informed Consent: Patient understands risks and agrees with Anesthesia plan.  Questions answered. Anesthesia consent signed with patient.  ASA Score: 2     Day of Surgery Review of History & Physical:    H&P update referred to the provider.         Ready For Surgery From Anesthesia Perspective.

## 2019-08-22 RX ORDER — METHOCARBAMOL 750 MG/1
TABLET, FILM COATED ORAL
Qty: 60 TABLET | Refills: 0 | Status: SHIPPED | OUTPATIENT
Start: 2019-08-22 | End: 2019-09-29 | Stop reason: SDUPTHER

## 2019-08-26 ENCOUNTER — TELEPHONE (OUTPATIENT)
Dept: ENDOSCOPY | Facility: HOSPITAL | Age: 59
End: 2019-08-26

## 2019-08-27 RX ORDER — MELOXICAM 15 MG/1
15 TABLET ORAL DAILY
Qty: 30 TABLET | Refills: 1 | Status: SHIPPED | OUTPATIENT
Start: 2019-08-27 | End: 2020-01-06

## 2019-09-30 RX ORDER — METHOCARBAMOL 750 MG/1
TABLET, FILM COATED ORAL
Qty: 60 TABLET | Refills: 0 | Status: SHIPPED | OUTPATIENT
Start: 2019-09-30 | End: 2020-05-14 | Stop reason: SDUPTHER

## 2019-11-18 ENCOUNTER — PATIENT OUTREACH (OUTPATIENT)
Dept: ADMINISTRATIVE | Facility: OTHER | Age: 59
End: 2019-11-18

## 2019-11-18 ENCOUNTER — OFFICE VISIT (OUTPATIENT)
Dept: ORTHOPEDICS | Facility: CLINIC | Age: 59
End: 2019-11-18
Payer: COMMERCIAL

## 2019-11-18 VITALS — WEIGHT: 182.75 LBS | HEIGHT: 64 IN | BODY MASS INDEX: 31.2 KG/M2

## 2019-11-18 DIAGNOSIS — M54.16 LUMBAR RADICULOPATHY: ICD-10-CM

## 2019-11-18 DIAGNOSIS — M43.16 SPONDYLOLISTHESIS OF LUMBAR REGION: Primary | ICD-10-CM

## 2019-11-18 PROCEDURE — 99213 PR OFFICE/OUTPT VISIT, EST, LEVL III, 20-29 MIN: ICD-10-PCS | Mod: S$GLB,,, | Performed by: PHYSICIAN ASSISTANT

## 2019-11-18 PROCEDURE — 3008F BODY MASS INDEX DOCD: CPT | Mod: CPTII,S$GLB,, | Performed by: PHYSICIAN ASSISTANT

## 2019-11-18 PROCEDURE — 99213 OFFICE O/P EST LOW 20 MIN: CPT | Mod: S$GLB,,, | Performed by: PHYSICIAN ASSISTANT

## 2019-11-18 PROCEDURE — 3008F PR BODY MASS INDEX (BMI) DOCUMENTED: ICD-10-PCS | Mod: CPTII,S$GLB,, | Performed by: PHYSICIAN ASSISTANT

## 2019-11-18 PROCEDURE — 99999 PR PBB SHADOW E&M-EST. PATIENT-LVL III: CPT | Mod: PBBFAC,,, | Performed by: PHYSICIAN ASSISTANT

## 2019-11-18 PROCEDURE — 99999 PR PBB SHADOW E&M-EST. PATIENT-LVL III: ICD-10-PCS | Mod: PBBFAC,,, | Performed by: PHYSICIAN ASSISTANT

## 2019-11-18 RX ORDER — METHYLPREDNISOLONE 4 MG/1
TABLET ORAL
Qty: 21 TABLET | Refills: 0 | Status: SHIPPED | OUTPATIENT
Start: 2019-11-18 | End: 2020-01-03

## 2019-11-18 RX ORDER — METHYLPREDNISOLONE 4 MG/1
TABLET ORAL
Qty: 1 PACKAGE | Refills: 0 | Status: SHIPPED | OUTPATIENT
Start: 2019-11-18 | End: 2019-11-18

## 2019-11-18 RX ORDER — DICLOFENAC SODIUM 10 MG/G
GEL TOPICAL
Qty: 1 TUBE | Refills: 3 | Status: SHIPPED | OUTPATIENT
Start: 2019-11-18 | End: 2019-11-18

## 2019-11-18 RX ORDER — DICLOFENAC SODIUM 10 MG/G
GEL TOPICAL
Qty: 1 TUBE | Refills: 3 | Status: SHIPPED | OUTPATIENT
Start: 2019-11-18 | End: 2022-02-21

## 2019-11-18 NOTE — PROGRESS NOTES
DATE: 11/18/2019  PATIENT: Buffy Morin    Attending Physician: Eric Dodd M.D.    HISTORY:  Buffy Morin is a 59 y.o. female who returns to me today for follow up of low back pain.  She was last seen by me 8/6/2019.  Today she is doing well but notes she was getting better but then about 3 weeks ago she started having pain in the buttocks as well.  She is miserable. The pain does not radiate to the legs.  There is no numbness or tingling.  There is no subjective weakness.  She has been taking robaxin and doing home exercises with little relief.     The Patient denies myelopathic symptoms such as handwriting changes or difficulty with buttons/coins/keys. Denies perineal paresthesias, bowel/bladder dysfunction.    PMH/PSH/FamHx/SocHx:  Unchanged from prior visit    ROS:  REVIEW OF SYSTEMS:  Constitution: Negative. Negative for chills, fever and night sweats.   HENT: Negative for congestion and headaches.    Eyes: Negative for blurred vision, left vision loss and right vision loss.   Cardiovascular: Negative for chest pain and syncope.   Respiratory: Negative for cough and shortness of breath.    Endocrine: Negative for polydipsia, polyphagia and polyuria.   Hematologic/Lymphatic: Negative for bleeding problem. Does not bruise/bleed easily.   Skin: Negative for dry skin, itching and rash.   Musculoskeletal: Negative for falls and muscle weakness.   Gastrointestinal: Negative for abdominal pain and bowel incontinence.   Allergic/Immunologic: Negative for hives and persistent infections.  Genitourinary: Negative for urinary retention/incontinence and nocturia.   Neurological: Negative for disturbances in coordination, no myelopathic symptoms such as handwriting changes or difficulty with buttons, coins, keys or small objects. No loss of balance and seizures.   Psychiatric/Behavioral: Negative for depression. The patient does not have insomnia.   Denies perineal paresthesias, bowel or bladder  "incontinence    EXAM:  Ht 5' 4" (1.626 m)   Wt 82.9 kg (182 lb 12.2 oz)   BMI 31.37 kg/m²     My physical examination was notable for the following findings:     Antalgic station and gait, no difficulty with toe or heel walk.   Dorsal lumbar skin negative for rashes, lesions, hairy patches and surgical scars. There is mild lumbar tenderness to palpation.  Lumbar range of motion is acceptable.  Global saggital and coronal spinal balance acceptable, not significant for scoliosis and kyphosis.  No pain with the range of motion of the bilateral hips. No trochanteric tenderness to palpation.  Bilateral lower extremities warm and well perfused, dorsalis pedis pulses 2+ bilaterally.  Normal strength and tone in all major motor groups in the bilateral lower extremities. Normal sensation to light touch in the L2-S1 dermatomes bilaterally.  Deep tendon reflexes symmetric 2+ in the bilateral lower extremities.  Negative Babinski bilaterally. Straight leg raise negative bilaterally.      IMAGING:  No new imaging today.    Today I personally re- reviewed AP, Lat and Flex/Ex  upright L-spine that demonstrate grade I anterolisthesis of L4/5.     Body mass index is 31.37 kg/m².    Hemoglobin A1C   Date Value Ref Range Status   04/02/2019 6.2 (H) 4.0 - 5.6 % Final     Comment:     ADA Screening Guidelines:  5.7-6.4%  Consistent with prediabetes  >or=6.5%  Consistent with diabetes  High levels of fetal hemoglobin interfere with the HbA1C  assay. Heterozygous hemoglobin variants (HbS, HgC, etc)do  not significantly interfere with this assay.   However, presence of multiple variants may affect accuracy.     09/22/2017 6.1 (H) 4.0 - 5.6 % Final     Comment:     According to ADA guidelines, hemoglobin A1c <7.0% represents  optimal control in non-pregnant diabetic patients. Different  metrics may apply to specific patient populations.   Standards of Medical Care in Diabetes-2016.  For the purpose of screening for the presence of " diabetes:  <5.7%     Consistent with the absence of diabetes  5.7-6.4%  Consistent with increasing risk for diabetes   (prediabetes)  >or=6.5%  Consistent with diabetes  Currently, no consensus exists for use of hemoglobin A1c  for diagnosis of diabetes for children.  This Hemoglobin A1c assay has significant interference with fetal   hemoglobin   (HbF). The results are invalid for patients with abnormal amounts of   HbF,   including those with known Hereditary Persistence   of Fetal Hemoglobin. Heterozygous hemoglobin variants (HbAS, HbAC,   HbAD, HbAE, HbA2) do not significantly interfere with this assay;   however, presence of multiple variants in a sample may impact the %   interference.           ASSESSMENT/PLAN:    Diagnoses and all orders for this visit:    Spondylolisthesis of lumbar region  -     Discontinue: diclofenac sodium (VOLTAREN) 1 % Gel; Apply 3-4 times a day as needed.  -     Discontinue: methylPREDNISolone (MEDROL DOSEPACK) 4 mg tablet; use as directed  -     methylPREDNISolone (MEDROL DOSEPACK) 4 mg tablet; use as directed  -     diclofenac sodium (VOLTAREN) 1 % Gel; Apply 3-4 times as needed    Lumbar radiculopathy  -     Discontinue: diclofenac sodium (VOLTAREN) 1 % Gel; Apply 3-4 times a day as needed.  -     Discontinue: methylPREDNISolone (MEDROL DOSEPACK) 4 mg tablet; use as directed  -     methylPREDNISolone (MEDROL DOSEPACK) 4 mg tablet; use as directed  -     diclofenac sodium (VOLTAREN) 1 % Gel; Apply 3-4 times as needed    Today we discussed at length all of the different treatment options including anti-inflammatories, acetaminophen, rest, ice, heat, physical therapy including strengthening and stretching exercises, home exercises, ROM, aerobic conditioning, aqua therapy, other modalities including ultrasound, massage, and dry needling, epidural steroid injections and finally surgical intervention.      Medrol dose pack sent to the pharmacy.  She will continue HEP on her own.  Follow  up in 2-3 weeks if symptoms persist.  We may consider an MRI at that time.     Follow up if symptoms worsen or fail to improve.

## 2019-12-06 ENCOUNTER — TELEPHONE (OUTPATIENT)
Dept: ORTHOPEDICS | Facility: CLINIC | Age: 59
End: 2019-12-06

## 2019-12-06 DIAGNOSIS — M43.16 SPONDYLOLISTHESIS OF LUMBAR REGION: Primary | ICD-10-CM

## 2019-12-13 ENCOUNTER — HOSPITAL ENCOUNTER (OUTPATIENT)
Dept: RADIOLOGY | Facility: HOSPITAL | Age: 59
Discharge: HOME | End: 2019-12-13
Attending: PHYSICIAN ASSISTANT
Payer: COMMERCIAL

## 2019-12-13 DIAGNOSIS — M43.16 SPONDYLOLISTHESIS OF LUMBAR REGION: ICD-10-CM

## 2019-12-13 PROCEDURE — 72148 MRI LUMBAR SPINE W/O DYE: CPT | Mod: 26,,, | Performed by: RADIOLOGY

## 2019-12-13 PROCEDURE — 72148 MRI LUMBAR SPINE W/O DYE: CPT | Mod: TC

## 2019-12-13 PROCEDURE — 72148 MRI LUMBAR SPINE WITHOUT CONTRAST: ICD-10-PCS | Mod: 26,,, | Performed by: RADIOLOGY

## 2019-12-18 DIAGNOSIS — I10 ESSENTIAL HYPERTENSION: ICD-10-CM

## 2019-12-18 RX ORDER — AMLODIPINE BESYLATE 5 MG/1
5 TABLET ORAL DAILY
Qty: 90 TABLET | Refills: 2 | Status: SHIPPED | OUTPATIENT
Start: 2019-12-18 | End: 2020-10-02 | Stop reason: SDUPTHER

## 2019-12-20 ENCOUNTER — OFFICE VISIT (OUTPATIENT)
Dept: ORTHOPEDICS | Facility: CLINIC | Age: 59
End: 2019-12-20
Payer: COMMERCIAL

## 2019-12-20 ENCOUNTER — TELEPHONE (OUTPATIENT)
Dept: PAIN MEDICINE | Facility: CLINIC | Age: 59
End: 2019-12-20

## 2019-12-20 VITALS — HEIGHT: 63 IN | BODY MASS INDEX: 32.03 KG/M2 | WEIGHT: 180.75 LBS

## 2019-12-20 DIAGNOSIS — M48.062 SPINAL STENOSIS OF LUMBAR REGION WITH NEUROGENIC CLAUDICATION: ICD-10-CM

## 2019-12-20 DIAGNOSIS — M43.16 SPONDYLOLISTHESIS OF LUMBAR REGION: Primary | ICD-10-CM

## 2019-12-20 DIAGNOSIS — M43.16 SPONDYLOLISTHESIS, LUMBAR REGION: Primary | ICD-10-CM

## 2019-12-20 PROCEDURE — 99213 PR OFFICE/OUTPT VISIT, EST, LEVL III, 20-29 MIN: ICD-10-PCS | Mod: S$GLB,,, | Performed by: PHYSICIAN ASSISTANT

## 2019-12-20 PROCEDURE — 3008F BODY MASS INDEX DOCD: CPT | Mod: CPTII,S$GLB,, | Performed by: PHYSICIAN ASSISTANT

## 2019-12-20 PROCEDURE — 3008F PR BODY MASS INDEX (BMI) DOCUMENTED: ICD-10-PCS | Mod: CPTII,S$GLB,, | Performed by: PHYSICIAN ASSISTANT

## 2019-12-20 PROCEDURE — 99999 PR PBB SHADOW E&M-EST. PATIENT-LVL III: CPT | Mod: PBBFAC,,, | Performed by: PHYSICIAN ASSISTANT

## 2019-12-20 PROCEDURE — 99999 PR PBB SHADOW E&M-EST. PATIENT-LVL III: ICD-10-PCS | Mod: PBBFAC,,, | Performed by: PHYSICIAN ASSISTANT

## 2019-12-20 PROCEDURE — 99213 OFFICE O/P EST LOW 20 MIN: CPT | Mod: S$GLB,,, | Performed by: PHYSICIAN ASSISTANT

## 2019-12-20 NOTE — TELEPHONE ENCOUNTER
Contacted patient to schedule procedure. Date, time, and instructions given. Patient verbalized understanding.

## 2019-12-20 NOTE — PROGRESS NOTES
"DATE: 12/20/2019  PATIENT: Buffy Morin    Attending Physician: Eric Dodd M.D.    HISTORY:  Buffy Morin is a 59 y.o. female who returns to me today for MRI results.  She was last seen by me 11/18/2019.  Today she is doing well but notes continued back and buttocks pain.  The pain is now going down her legs too.     The Patient denies myelopathic symptoms such as handwriting changes or difficulty with buttons/coins/keys. Denies perineal paresthesias, bowel/bladder dysfunction.    PMH/PSH/FamHx/SocHx:  Unchanged from prior visit    ROS:  REVIEW OF SYSTEMS:  Constitution: Negative. Negative for chills, fever and night sweats.   HENT: Negative for congestion and headaches.    Eyes: Negative for blurred vision, left vision loss and right vision loss.   Cardiovascular: Negative for chest pain and syncope.   Respiratory: Negative for cough and shortness of breath.    Endocrine: Negative for polydipsia, polyphagia and polyuria.   Hematologic/Lymphatic: Negative for bleeding problem. Does not bruise/bleed easily.   Skin: Negative for dry skin, itching and rash.   Musculoskeletal: Negative for falls and muscle weakness.   Gastrointestinal: Negative for abdominal pain and bowel incontinence.   Allergic/Immunologic: Negative for hives and persistent infections.  Genitourinary: Negative for urinary retention/incontinence and nocturia.   Neurological: Negative for disturbances in coordination, no myelopathic symptoms such as handwriting changes or difficulty with buttons, coins, keys or small objects. No loss of balance and seizures.   Psychiatric/Behavioral: Negative for depression. The patient does not have insomnia.   Denies perineal paresthesias, bowel or bladder incontinence    EXAM:  Ht 5' 3" (1.6 m)   Wt 82 kg (180 lb 12.4 oz)   BMI 32.02 kg/m²     Physical exam stable. Neuro exam stable.       IMAGING:  No new imaging today.    Today I personally re- reviewed AP, Lat and Flex/Ex  upright L-spine that " demonstrate grade I anterolisthesis at L4/5.    MRI lumbar spine demonstrates grade I anterolisthesis at L4/5 with moderate stenosis.     Body mass index is 32.02 kg/m².    Hemoglobin A1C   Date Value Ref Range Status   04/02/2019 6.2 (H) 4.0 - 5.6 % Final     Comment:     ADA Screening Guidelines:  5.7-6.4%  Consistent with prediabetes  >or=6.5%  Consistent with diabetes  High levels of fetal hemoglobin interfere with the HbA1C  assay. Heterozygous hemoglobin variants (HbS, HgC, etc)do  not significantly interfere with this assay.   However, presence of multiple variants may affect accuracy.     09/22/2017 6.1 (H) 4.0 - 5.6 % Final     Comment:     According to ADA guidelines, hemoglobin A1c <7.0% represents  optimal control in non-pregnant diabetic patients. Different  metrics may apply to specific patient populations.   Standards of Medical Care in Diabetes-2016.  For the purpose of screening for the presence of diabetes:  <5.7%     Consistent with the absence of diabetes  5.7-6.4%  Consistent with increasing risk for diabetes   (prediabetes)  >or=6.5%  Consistent with diabetes  Currently, no consensus exists for use of hemoglobin A1c  for diagnosis of diabetes for children.  This Hemoglobin A1c assay has significant interference with fetal   hemoglobin   (HbF). The results are invalid for patients with abnormal amounts of   HbF,   including those with known Hereditary Persistence   of Fetal Hemoglobin. Heterozygous hemoglobin variants (HbAS, HbAC,   HbAD, HbAE, HbA2) do not significantly interfere with this assay;   however, presence of multiple variants in a sample may impact the %   interference.           ASSESSMENT/PLAN:    Buffy was seen today for pain.    Diagnoses and all orders for this visit:    Spondylolisthesis of lumbar region  -     Procedure Order to [facility]; Future    Spinal stenosis of lumbar region with neurogenic claudication  -     Procedure Order to [facility];  Future      Today we discussed at length all of the different treatment options including anti-inflammatories, acetaminophen, rest, ice, heat, physical therapy including strengthening and stretching exercises, home exercises, ROM, aerobic conditioning, aqua therapy, other modalities including ultrasound, massage, and dry needling, epidural steroid injections and finally surgical intervention.      We will get her scheduled for an [facility] at [facility].  Follow up 2 weeks after injection.     Follow up if symptoms worsen or fail to improve.

## 2019-12-20 NOTE — H&P (VIEW-ONLY)
"DATE: 12/20/2019  PATIENT: Buffy Morin    Attending Physician: Eric Dodd M.D.    HISTORY:  Buffy Morin is a 59 y.o. female who returns to me today for MRI results.  She was last seen by me 11/18/2019.  Today she is doing well but notes continued back and buttocks pain.  The pain is now going down her legs too.     The Patient denies myelopathic symptoms such as handwriting changes or difficulty with buttons/coins/keys. Denies perineal paresthesias, bowel/bladder dysfunction.    PMH/PSH/FamHx/SocHx:  Unchanged from prior visit    ROS:  REVIEW OF SYSTEMS:  Constitution: Negative. Negative for chills, fever and night sweats.   HENT: Negative for congestion and headaches.    Eyes: Negative for blurred vision, left vision loss and right vision loss.   Cardiovascular: Negative for chest pain and syncope.   Respiratory: Negative for cough and shortness of breath.    Endocrine: Negative for polydipsia, polyphagia and polyuria.   Hematologic/Lymphatic: Negative for bleeding problem. Does not bruise/bleed easily.   Skin: Negative for dry skin, itching and rash.   Musculoskeletal: Negative for falls and muscle weakness.   Gastrointestinal: Negative for abdominal pain and bowel incontinence.   Allergic/Immunologic: Negative for hives and persistent infections.  Genitourinary: Negative for urinary retention/incontinence and nocturia.   Neurological: Negative for disturbances in coordination, no myelopathic symptoms such as handwriting changes or difficulty with buttons, coins, keys or small objects. No loss of balance and seizures.   Psychiatric/Behavioral: Negative for depression. The patient does not have insomnia.   Denies perineal paresthesias, bowel or bladder incontinence    EXAM:  Ht 5' 3" (1.6 m)   Wt 82 kg (180 lb 12.4 oz)   BMI 32.02 kg/m²     Physical exam stable. Neuro exam stable.       IMAGING:  No new imaging today.    Today I personally re- reviewed AP, Lat and Flex/Ex  upright L-spine that " demonstrate grade I anterolisthesis at L4/5.    MRI lumbar spine demonstrates grade I anterolisthesis at L4/5 with moderate stenosis.     Body mass index is 32.02 kg/m².    Hemoglobin A1C   Date Value Ref Range Status   04/02/2019 6.2 (H) 4.0 - 5.6 % Final     Comment:     ADA Screening Guidelines:  5.7-6.4%  Consistent with prediabetes  >or=6.5%  Consistent with diabetes  High levels of fetal hemoglobin interfere with the HbA1C  assay. Heterozygous hemoglobin variants (HbS, HgC, etc)do  not significantly interfere with this assay.   However, presence of multiple variants may affect accuracy.     09/22/2017 6.1 (H) 4.0 - 5.6 % Final     Comment:     According to ADA guidelines, hemoglobin A1c <7.0% represents  optimal control in non-pregnant diabetic patients. Different  metrics may apply to specific patient populations.   Standards of Medical Care in Diabetes-2016.  For the purpose of screening for the presence of diabetes:  <5.7%     Consistent with the absence of diabetes  5.7-6.4%  Consistent with increasing risk for diabetes   (prediabetes)  >or=6.5%  Consistent with diabetes  Currently, no consensus exists for use of hemoglobin A1c  for diagnosis of diabetes for children.  This Hemoglobin A1c assay has significant interference with fetal   hemoglobin   (HbF). The results are invalid for patients with abnormal amounts of   HbF,   including those with known Hereditary Persistence   of Fetal Hemoglobin. Heterozygous hemoglobin variants (HbAS, HbAC,   HbAD, HbAE, HbA2) do not significantly interfere with this assay;   however, presence of multiple variants in a sample may impact the %   interference.           ASSESSMENT/PLAN:    Buffy was seen today for pain.    Diagnoses and all orders for this visit:    Spondylolisthesis of lumbar region  -     Procedure Order to [facility]; Future    Spinal stenosis of lumbar region with neurogenic claudication  -     Procedure Order to [facility];  Future      Today we discussed at length all of the different treatment options including anti-inflammatories, acetaminophen, rest, ice, heat, physical therapy including strengthening and stretching exercises, home exercises, ROM, aerobic conditioning, aqua therapy, other modalities including ultrasound, massage, and dry needling, epidural steroid injections and finally surgical intervention.      We will get her scheduled for an JASON at [facility].  Follow up 2 weeks after injection.     Follow up if symptoms worsen or fail to improve.

## 2019-12-23 ENCOUNTER — HOSPITAL ENCOUNTER (OUTPATIENT)
Facility: OTHER | Age: 59
Discharge: HOME | End: 2019-12-23
Attending: ANESTHESIOLOGY | Admitting: ANESTHESIOLOGY
Payer: COMMERCIAL

## 2019-12-23 VITALS
HEIGHT: 63 IN | RESPIRATION RATE: 18 BRPM | BODY MASS INDEX: 31.89 KG/M2 | WEIGHT: 180 LBS | TEMPERATURE: 98 F | DIASTOLIC BLOOD PRESSURE: 77 MMHG | HEART RATE: 70 BPM | SYSTOLIC BLOOD PRESSURE: 137 MMHG | OXYGEN SATURATION: 94 %

## 2019-12-23 DIAGNOSIS — M54.16 LUMBAR RADICULOPATHY: Primary | ICD-10-CM

## 2019-12-23 DIAGNOSIS — G89.29 CHRONIC PAIN: ICD-10-CM

## 2019-12-23 PROCEDURE — 25500020 PHARM REV CODE 255: Performed by: ANESTHESIOLOGY

## 2019-12-23 PROCEDURE — 64483 NJX AA&/STRD TFRM EPI L/S 1: CPT | Mod: 50 | Performed by: ANESTHESIOLOGY

## 2019-12-23 PROCEDURE — 25000003 PHARM REV CODE 250: Performed by: ANESTHESIOLOGY

## 2019-12-23 PROCEDURE — 64483 NJX AA&/STRD TFRM EPI L/S 1: CPT | Mod: 50,,, | Performed by: ANESTHESIOLOGY

## 2019-12-23 PROCEDURE — 64483 PR EPIDURAL INJ, ANES/STEROID, TRANSFORAMINAL, LUMB/SACR, SNGL LEVL: ICD-10-PCS | Mod: 50,,, | Performed by: ANESTHESIOLOGY

## 2019-12-23 PROCEDURE — 63600175 PHARM REV CODE 636 W HCPCS: Performed by: ANESTHESIOLOGY

## 2019-12-23 RX ORDER — SODIUM CHLORIDE 9 MG/ML
500 INJECTION, SOLUTION INTRAVENOUS CONTINUOUS
Status: DISCONTINUED | OUTPATIENT
Start: 2019-12-23 | End: 2019-12-23 | Stop reason: HOSPADM

## 2019-12-23 RX ORDER — MIDAZOLAM HYDROCHLORIDE 1 MG/ML
INJECTION INTRAMUSCULAR; INTRAVENOUS
Status: DISCONTINUED | OUTPATIENT
Start: 2019-12-23 | End: 2019-12-23 | Stop reason: HOSPADM

## 2019-12-23 RX ORDER — FENTANYL CITRATE 50 UG/ML
INJECTION, SOLUTION INTRAMUSCULAR; INTRAVENOUS
Status: DISCONTINUED | OUTPATIENT
Start: 2019-12-23 | End: 2019-12-23 | Stop reason: HOSPADM

## 2019-12-23 RX ORDER — LIDOCAINE HYDROCHLORIDE 5 MG/ML
INJECTION, SOLUTION INFILTRATION; INTRAVENOUS
Status: DISCONTINUED | OUTPATIENT
Start: 2019-12-23 | End: 2019-12-23 | Stop reason: HOSPADM

## 2019-12-23 RX ORDER — DEXAMETHASONE SODIUM PHOSPHATE 10 MG/ML
INJECTION INTRAMUSCULAR; INTRAVENOUS
Status: DISCONTINUED | OUTPATIENT
Start: 2019-12-23 | End: 2019-12-23 | Stop reason: HOSPADM

## 2019-12-23 RX ORDER — LIDOCAINE HYDROCHLORIDE 10 MG/ML
INJECTION INFILTRATION; PERINEURAL
Status: DISCONTINUED | OUTPATIENT
Start: 2019-12-23 | End: 2019-12-23 | Stop reason: HOSPADM

## 2019-12-23 RX ADMIN — SODIUM CHLORIDE 500 ML: 0.9 INJECTION, SOLUTION INTRAVENOUS at 01:12

## 2019-12-23 NOTE — DISCHARGE SUMMARY
Discharge Note  Short Stay      SUMMARY     Admit Date: 12/23/2019    Attending Physician: Dion Pierre      Discharge Physician: Dion Pierre      Discharge Date: 12/23/2019 1:35 PM    Procedure(s) (LRB):  LUMBAR TRANSFORAMINAL BILATERAL L4/5 TF JASON (Bilateral)    Final Diagnosis: Spondylolisthesis, lumbar region [M43.16]    Disposition: Home or self care    Patient Instructions:   Current Discharge Medication List      CONTINUE these medications which have NOT CHANGED    Details   amLODIPine (NORVASC) 5 MG tablet TAKE 1 TABLET (5 MG TOTAL) BY MOUTH ONCE DAILY.  Qty: 90 tablet, Refills: 2    Associated Diagnoses: Essential hypertension      atorvastatin (LIPITOR) 20 MG tablet TAKE 1 TABLET BY MOUTH EVERY DAY  Qty: 90 tablet, Refills: 3      cholecalciferol, vitamin D3, (VITAMIN D) 2,000 unit Cap Take 1 capsule by mouth Daily. Take vitamin D 2000 units every day.      diclofenac sodium (VOLTAREN) 1 % Gel Apply two grams to affected area 3-4 times as needed  Qty: 1 Tube, Refills: 3    Comments: 2grm per application per Nlaini  Associated Diagnoses: Spondylolisthesis of lumbar region; Lumbar radiculopathy      escitalopram oxalate (LEXAPRO) 10 MG tablet Take 1 tablet (10 mg total) by mouth once daily.  Qty: 90 tablet, Refills: 3      LORazepam (ATIVAN) 0.5 MG tablet Take 1 tablet (0.5 mg total) by mouth daily as needed for Anxiety (as needed for anxiety no more than 2-3 x weekly).  Qty: 30 tablet, Refills: 1      methocarbamol (ROBAXIN) 750 MG Tab TAKE 1 TABLET BY MOUTH THREE TIMES A DAY AS NEEDED FOR MUSCLE SPASMS  Qty: 60 tablet, Refills: 0      methylPREDNISolone (MEDROL DOSEPACK) 4 mg tablet Use as directed  Qty: 21 tablet, Refills: 0    Associated Diagnoses: Spondylolisthesis of lumbar region; Lumbar radiculopathy                 Discharge Diagnosis: Spondylolisthesis, lumbar region [M43.16]  Condition on Discharge: Stable with no complications to procedure   Diet on Discharge: Same as before.  Activity: as per  instruction sheet.  Discharge to: Home with a responsible adult.  Follow up: 2-4 weeks       Please call my office or pager at [phone] if experienced any weakness or loss of sensation, fever > 101.5, pain uncontrolled with oral medications, persistent nausea/vomiting/or diarrhea, redness or drainage from the incisions, or any other worrisome concerns. If physician on call was not reached or could not communicate with our office for any reason please go to the nearest emergency department

## 2019-12-23 NOTE — DISCHARGE INSTRUCTIONS

## 2019-12-23 NOTE — OP NOTE
Patient Name: Buffy Morin  MRN: [id number]    INFORMED CONSENT: The procedure, risks, benefits and options were discussed with patient. There are no contraindications to the procedure. The patient expressed understanding and agreed to proceed. The personnel performing the procedure was discussed. I verify that I personally obtained Buffy's consent prior to the start of the procedure and the signed consent can be found on the patient's chart.    Procedure Date: 12/23/2019    Anesthesia: Topical    Pre Procedure diagnosis: Spondylolisthesis, lumbar region [M43.16]  1. Lumbar radiculopathy    2. Chronic pain      Post-Procedure diagnosis: SAME      Sedation: Yes - Fentanyl 50 mcg and Midazolam 2 mg  Sedation time: Less than15 minutes    PROCEDURE:Bilateral L4/L5   TRANSFORAMINAL EPIDURAL STEROID INJECTION        DESCRIPTION OF PROCEDURE: The patient was brought to the procedure room. After performing time out IV access was obtained prior to the procedure. The patient was positioned prone on the fluoroscopy table. Continuous hemodynamic monitoring was initiated including blood pressure, EKG, and pulse oximetry. The skin was prepped with chlorhexidine three times and draped in a sterile fashion. Skin anesthesia was achieved using 3 mL of lidocaine 1% over the respective injection site.     An oblique fluoroscopic view was obtained, with the superior articular process of the inferior vertebral body aligned with the pedicle. The tip of a 22-gauge 5-inch Quincke-type spinal needle was advanced toward the 6 oclock position of the pedicle under intermittent fluoroscopic guidance. Confirmation of proper needle position was made with AP, oblique, and lateral fluoroscopic views. Negative aspiration for blood or CSF was confirmed. 2 mL of Omnipaque 300 was injected. Live fluoroscopic imaging revealed a clear outline of the spinal nerve with proximal spread of agent through the neural foramen into the anterior epidural space. A  total combination of 3 mL of Lidocaine 0.5% and 10 mg decadron was injected at each level. Contrast spread was noted from L3 to L5 level. There was no pain on injection. The needle was removed and bleeding was nil.  A sterile dressing was applied. [place] was taken back to the recovery room for further observation.     Blood Loss: Nill  Specimen: None    Hernesto Lazo MD

## 2019-12-30 RX ORDER — ESCITALOPRAM OXALATE 10 MG/1
TABLET ORAL
Qty: 90 TABLET | Refills: 3 | Status: SHIPPED | OUTPATIENT
Start: 2019-12-30 | End: 2021-10-07 | Stop reason: SDUPTHER

## 2020-01-03 ENCOUNTER — OFFICE VISIT (OUTPATIENT)
Dept: ORTHOPEDICS | Facility: CLINIC | Age: 60
End: 2020-01-03
Payer: COMMERCIAL

## 2020-01-03 VITALS — WEIGHT: 180 LBS | HEIGHT: 63 IN | BODY MASS INDEX: 31.89 KG/M2

## 2020-01-03 DIAGNOSIS — M48.062 SPINAL STENOSIS OF LUMBAR REGION WITH NEUROGENIC CLAUDICATION: ICD-10-CM

## 2020-01-03 DIAGNOSIS — M43.16 SPONDYLOLISTHESIS OF LUMBAR REGION: Primary | ICD-10-CM

## 2020-01-03 PROCEDURE — 99999 PR PBB SHADOW E&M-EST. PATIENT-LVL III: ICD-10-PCS | Mod: PBBFAC,,, | Performed by: PHYSICIAN ASSISTANT

## 2020-01-03 PROCEDURE — 3008F PR BODY MASS INDEX (BMI) DOCUMENTED: ICD-10-PCS | Mod: CPTII,S$GLB,, | Performed by: PHYSICIAN ASSISTANT

## 2020-01-03 PROCEDURE — 99999 PR PBB SHADOW E&M-EST. PATIENT-LVL III: CPT | Mod: PBBFAC,,, | Performed by: PHYSICIAN ASSISTANT

## 2020-01-03 PROCEDURE — 99213 OFFICE O/P EST LOW 20 MIN: CPT | Mod: S$GLB,,, | Performed by: PHYSICIAN ASSISTANT

## 2020-01-03 PROCEDURE — 3008F BODY MASS INDEX DOCD: CPT | Mod: CPTII,S$GLB,, | Performed by: PHYSICIAN ASSISTANT

## 2020-01-03 PROCEDURE — 99213 PR OFFICE/OUTPT VISIT, EST, LEVL III, 20-29 MIN: ICD-10-PCS | Mod: S$GLB,,, | Performed by: PHYSICIAN ASSISTANT

## 2020-01-03 RX ORDER — GABAPENTIN 100 MG/1
100 CAPSULE ORAL 3 TIMES DAILY
Qty: 90 CAPSULE | Refills: 0 | Status: SHIPPED | OUTPATIENT
Start: 2020-01-03

## 2020-01-03 NOTE — PROGRESS NOTES
"DATE: 1/3/2020  PATIENT: Buffy Morin    Attending Physician: Eric Dodd M.D.    HISTORY:  Buffy Morin is a 59 y.o. female who returns to me today for follow up of low back and bilateral leg pain.  She was last seen by me 12/20/2019.  Today she is doing well but notes she had an JASON 12/23.  She reports about 65% relief for a couple days before her pain returned.  She is miserable.     The Patient denies myelopathic symptoms such as handwriting changes or difficulty with buttons/coins/keys. Denies perineal paresthesias, bowel/bladder dysfunction.    PMH/PSH/FamHx/SocHx:  Unchanged from prior visit    ROS:  REVIEW OF SYSTEMS:  Constitution: Negative. Negative for chills, fever and night sweats.   HENT: Negative for congestion and headaches.    Eyes: Negative for blurred vision, left vision loss and right vision loss.   Cardiovascular: Negative for chest pain and syncope.   Respiratory: Negative for cough and shortness of breath.    Endocrine: Negative for polydipsia, polyphagia and polyuria.   Hematologic/Lymphatic: Negative for bleeding problem. Does not bruise/bleed easily.   Skin: Negative for dry skin, itching and rash.   Musculoskeletal: Negative for falls and muscle weakness.   Gastrointestinal: Negative for abdominal pain and bowel incontinence.   Allergic/Immunologic: Negative for hives and persistent infections.  Genitourinary: Negative for urinary retention/incontinence and nocturia.   Neurological: Negative for disturbances in coordination, no myelopathic symptoms such as handwriting changes or difficulty with buttons, coins, keys or small objects. No loss of balance and seizures.   Psychiatric/Behavioral: Negative for depression. The patient does not have insomnia.   Denies perineal paresthesias, bowel or bladder incontinence    EXAM:  Ht 5' 3" (1.6 m)   Wt 81.6 kg (180 lb)   BMI 31.89 kg/m²     Physical exam stable. Neuro exam stable.       IMAGING:  No new imaging today.    Today I " personally re- reviewed AP, Lat and Flex/Ex  upright L-spine that demonstrate grade I anterolisthesis at L4/5.     MRI lumbar spine demonstrates grade I anterolisthesis at L4/5 with moderate stenosis.     Body mass index is 31.89 kg/m².    Hemoglobin A1C   Date Value Ref Range Status   04/02/2019 6.2 (H) 4.0 - 5.6 % Final     Comment:     ADA Screening Guidelines:  5.7-6.4%  Consistent with prediabetes  >or=6.5%  Consistent with diabetes  High levels of fetal hemoglobin interfere with the HbA1C  assay. Heterozygous hemoglobin variants (HbS, HgC, etc)do  not significantly interfere with this assay.   However, presence of multiple variants may affect accuracy.     09/22/2017 6.1 (H) 4.0 - 5.6 % Final     Comment:     According to ADA guidelines, hemoglobin A1c <7.0% represents  optimal control in non-pregnant diabetic patients. Different  metrics may apply to specific patient populations.   Standards of Medical Care in Diabetes-2016.  For the purpose of screening for the presence of diabetes:  <5.7%     Consistent with the absence of diabetes  5.7-6.4%  Consistent with increasing risk for diabetes   (prediabetes)  >or=6.5%  Consistent with diabetes  Currently, no consensus exists for use of hemoglobin A1c  for diagnosis of diabetes for children.  This Hemoglobin A1c assay has significant interference with fetal   hemoglobin   (HbF). The results are invalid for patients with abnormal amounts of   HbF,   including those with known Hereditary Persistence   of Fetal Hemoglobin. Heterozygous hemoglobin variants (HbAS, HbAC,   HbAD, HbAE, HbA2) do not significantly interfere with this assay;   however, presence of multiple variants in a sample may impact the %   interference.           ASSESSMENT/PLAN:    Buffy was seen today for follow-up.    Diagnoses and all orders for this visit:    Spondylolisthesis of lumbar region  -     gabapentin (NEURONTIN) 100 MG capsule; Take 1 capsule (100 mg total) by mouth 3 (three) times  daily.  -     Procedure Order to [facility]; Future    Spinal stenosis of lumbar region with neurogenic claudication  -     gabapentin (NEURONTIN) 100 MG capsule; Take 1 capsule (100 mg total) by mouth 3 (three) times daily.  -     Procedure Order to [facility]; Future        Today we discussed at length all of the different treatment options including anti-inflammatories, acetaminophen, rest, ice, heat, physical therapy including strengthening and stretching exercises, home exercises, ROM, aerobic conditioning, aqua therapy, other modalities including ultrasound, massage, and dry needling, epidural steroid injections and finally surgical intervention.      She is not interested in surgical options at this time.  She would like to try a second injection. Orders placed today.  Follow up 2 weeks after injection.  Prescription for gabapentin sent to the pharmacy.     Follow up if symptoms worsen or fail to improve.

## 2020-01-06 RX ORDER — MELOXICAM 15 MG/1
15 TABLET ORAL DAILY
Qty: 30 TABLET | Refills: 1 | Status: SHIPPED | OUTPATIENT
Start: 2020-01-06 | End: 2020-02-05

## 2020-01-07 ENCOUNTER — TELEPHONE (OUTPATIENT)
Dept: PAIN MEDICINE | Facility: CLINIC | Age: 60
End: 2020-01-07

## 2020-01-07 DIAGNOSIS — M43.16 SPONDYLOLISTHESIS OF LUMBAR REGION: Primary | ICD-10-CM

## 2020-01-16 ENCOUNTER — HOSPITAL ENCOUNTER (OUTPATIENT)
Facility: OTHER | Age: 60
Discharge: HOME | End: 2020-01-16
Attending: ANESTHESIOLOGY | Admitting: ANESTHESIOLOGY
Payer: COMMERCIAL

## 2020-01-16 VITALS
RESPIRATION RATE: 18 BRPM | DIASTOLIC BLOOD PRESSURE: 75 MMHG | OXYGEN SATURATION: 96 % | SYSTOLIC BLOOD PRESSURE: 135 MMHG | HEART RATE: 66 BPM | HEIGHT: 63 IN | TEMPERATURE: 99 F | WEIGHT: 180 LBS | BODY MASS INDEX: 31.89 KG/M2

## 2020-01-16 DIAGNOSIS — M54.16 LUMBAR RADICULOPATHY: Primary | ICD-10-CM

## 2020-01-16 PROCEDURE — 64483 NJX AA&/STRD TFRM EPI L/S 1: CPT | Mod: 50,,, | Performed by: ANESTHESIOLOGY

## 2020-01-16 PROCEDURE — 64483 NJX AA&/STRD TFRM EPI L/S 1: CPT | Mod: 50 | Performed by: ANESTHESIOLOGY

## 2020-01-16 PROCEDURE — 99152 MOD SED SAME PHYS/QHP 5/>YRS: CPT | Mod: ,,, | Performed by: ANESTHESIOLOGY

## 2020-01-16 PROCEDURE — 63600175 PHARM REV CODE 636 W HCPCS: Performed by: STUDENT IN AN ORGANIZED HEALTH CARE EDUCATION/TRAINING PROGRAM

## 2020-01-16 PROCEDURE — 99152 PR MOD CONSCIOUS SEDATION, SAME PHYS, 5+ YRS, FIRST 15 MIN: ICD-10-PCS | Mod: ,,, | Performed by: ANESTHESIOLOGY

## 2020-01-16 PROCEDURE — 64483 PR EPIDURAL INJ, ANES/STEROID, TRANSFORAMINAL, LUMB/SACR, SNGL LEVL: ICD-10-PCS | Mod: 50,,, | Performed by: ANESTHESIOLOGY

## 2020-01-16 PROCEDURE — 25500020 PHARM REV CODE 255: Performed by: ANESTHESIOLOGY

## 2020-01-16 PROCEDURE — 63600175 PHARM REV CODE 636 W HCPCS: Performed by: ANESTHESIOLOGY

## 2020-01-16 PROCEDURE — 25000003 PHARM REV CODE 250: Performed by: ANESTHESIOLOGY

## 2020-01-16 RX ORDER — MIDAZOLAM HYDROCHLORIDE 1 MG/ML
INJECTION INTRAMUSCULAR; INTRAVENOUS
Status: DISCONTINUED | OUTPATIENT
Start: 2020-01-16 | End: 2020-01-16 | Stop reason: HOSPADM

## 2020-01-16 RX ORDER — DEXAMETHASONE SODIUM PHOSPHATE 100 MG/10ML
INJECTION INTRAMUSCULAR; INTRAVENOUS
Status: DISCONTINUED | OUTPATIENT
Start: 2020-01-16 | End: 2020-01-16 | Stop reason: HOSPADM

## 2020-01-16 RX ORDER — LIDOCAINE HYDROCHLORIDE 10 MG/ML
INJECTION, SOLUTION EPIDURAL; INFILTRATION; INTRACAUDAL; PERINEURAL
Status: DISCONTINUED | OUTPATIENT
Start: 2020-01-16 | End: 2020-01-16 | Stop reason: HOSPADM

## 2020-01-16 RX ORDER — FENTANYL CITRATE 50 UG/ML
INJECTION, SOLUTION INTRAMUSCULAR; INTRAVENOUS
Status: DISCONTINUED | OUTPATIENT
Start: 2020-01-16 | End: 2020-01-16 | Stop reason: HOSPADM

## 2020-01-16 RX ORDER — SODIUM CHLORIDE 9 MG/ML
500 INJECTION, SOLUTION INTRAVENOUS CONTINUOUS
Status: DISCONTINUED | OUTPATIENT
Start: 2020-01-16 | End: 2020-01-16 | Stop reason: HOSPADM

## 2020-01-16 RX ORDER — LIDOCAINE HYDROCHLORIDE 10 MG/ML
INJECTION INFILTRATION; PERINEURAL
Status: DISCONTINUED | OUTPATIENT
Start: 2020-01-16 | End: 2020-01-16 | Stop reason: HOSPADM

## 2020-01-16 RX ADMIN — SODIUM CHLORIDE 500 ML: 0.9 INJECTION, SOLUTION INTRAVENOUS at 01:01

## 2020-01-16 NOTE — OP NOTE
Date of Service: 01/16/2020    PCP: Minoo Carrasquillo MD    Referring Physician:    Time-out taken to identify patient and procedure side prior to starting the procedure.   I attest that I have reviewed the patient's home medications prior to the procedure and no contraindication have been identified. I  re-evaluated the patient after the patient was positioned for the procedure in the procedure room immediately before the procedural time-out. The vital signs are current and represent the current state of the patient which has not significantly changed since the preprocedure assessment.                                                           PROCEDURE: Bilateral L5 transforaminal epidural steroid injection under fluoroscopy    REASON FOR PROCEDURE: Bilateral Spondylolisthesis of lumbar region [M43.16]  1. Lumbar radiculopathy      POSTPROCEDURE DIAGNOSIS:   Spondylolisthesis of lumbar region [M43.16]    1. Lumbar radiculopathy           PHYSICIAN: Pamela Erazo MD  ASSISTANTS:Kalpesh Shah D.O.  Pain Fellow     MEDICATIONS INJECTED:  Preservative-free dexamethasone 10mg, Xylocaine 1% MPF 3-5ml. 3ml per level. Preservative free, sterile normal saline is used to get larger volume as needed.  LOCAL ANESTHETIC INJECTED:  Xylocaine 1% 9ml with Sodium Bicarbonate 1ml. 3ml per site.    SEDATION MEDICATIONS: local/IV sedation: Versed 2 mg and fentanyl 50 mcg IV.  Conscious sedation ordered by MD.  Patient reevaluated and sedation administered by MD and monitored by RN.  Total sedation time was less than 10 min. (See nurse documentation and case log for sedation time)    ESTIMATED BLOOD LOSS:  None.    COMPLICATIONS:  None.    TECHNIQUE:   Laying in a prone position, the patient was prepped and draped in the usual sterile fashion using ChloraPrep and fenestrated drape.  The area to be injected was determined under fluoroscopic guidance.  Local anesthetic was given by raising a wheel and going down to the hub of a  27-gauge 1.25 inch needle.  The 3.5inch 22-gauge spinal needle was introduced towards the transverse process of all levels as stated above.   The needle was walked medially then hinged into the neural foramen.  Omnipaque was injected to confirm appropriate placement and that there was no vascular runoff.  The medication was then injected after applying negative pressure. The patient tolerated the procedure well.    PAIN BEFORE THE PROCEDURE: 10/10.    PAIN AFTER THE PROCEDURE: 0/10.    The patient was monitored after the procedure.  Patient was given post procedure and discharge instructions to follow at home.  We will see the patient back in two weeks or the patient may call to inform of status. The patient was discharged in a stable condition.

## 2020-01-16 NOTE — H&P
"HPI  Patient presenting for Procedure(s) (LRB):  LUMBAR TRANSFORAMINAL BILATERAL L4 TF JASON(Bilateral). She did not have any pain relief from bilateral L4 TFESI on 12/23/19. Her pain runs from her low back down her lateral legs. We will adjust the levels of the procedure to bilateral L5 TFESI since she did not have any pain relief from prior procedure.      Patient on Anti-coagulation No    No health changes since previous encounter    Past Medical History:   Diagnosis Date    Abnormal Pap smear of cervix     Anxiety     At high risk for breast cancer: TC 7 > 20% 2019 2/6/2019    Carpal tunnel syndrome     Elevated blood pressure     Family history of breast cancer in sister 10/1/2013    Hypertension     S/P hysterectomy 9/23/2017     Past Surgical History:   Procedure Laterality Date    BREAST BIOPSY Right     2007    COLONOSCOPY N/A 8/19/2019    Procedure: COLONOSCOPY;  Surgeon: ROHIT Romero MD;  Location: [facility] ENDO ([address] [place] [address]);  Service: Endoscopy;  Laterality: N/A;    HYSTERECTOMY      Total    TRANSFORAMINAL EPIDURAL INJECTION OF STEROID Bilateral 12/23/2019    Procedure: LUMBAR TRANSFORAMINAL BILATERAL L4/5 TF JASON;  Surgeon: Hernesto Lazo MD;  Location: [facility] [place], operated by [facility];  Service: Pain Management;  Laterality: Bilateral;  NEEDS CONSENT    TUBAL LIGATION       Review of patient's allergies indicates:   Allergen Reactions    No known drug allergies       Current Facility-Administered Medications   Medication    0.9%  NaCl infusion       PMHx, PSHx, Allergies, Medications reviewed in epic    ROS negative except pain complaints in HPI    OBJECTIVE:    BP (!) 176/87 (BP Location: Right arm, Patient Position: Lying)   Pulse 72   Temp 98.6 °F (37 °C) (Oral)   Resp 14   Ht 5' 3" (1.6 m)   Wt 81.6 kg (180 lb)   SpO2 97%   Breastfeeding? No   BMI 31.89 kg/m²     PHYSICAL EXAMINATION:    GENERAL: Well appearing, in no acute distress, alert and oriented x3.  PSYCH:  Mood and affect " appropriate.  SKIN: Skin color, texture, turgor normal, no rashes or lesions which will impact the procedure.  CV: RRR with palpation of the radial artery.  PULM: No evidence of respiratory difficulty, symmetric chest rise. Clear to auscultation.  NEURO: Cranial nerves grossly intact.    Plan:    Proceed with procedure as planned Procedure(s) (LRB):  LUMBAR TRANSFORAMINAL BILATERAL L5 TF JASON (Bilateral)    Kalpesh Shah  01/16/2020

## 2020-01-16 NOTE — DISCHARGE SUMMARY
Discharge Note  Short Stay      SUMMARY     Admit Date: 1/16/2020    Attending Physician: Pamela Erazo      Discharge Physician: Pamela Erazo      Discharge Date: 1/16/2020 2:14 PM    Procedure(s) (LRB):  LUMBAR TRANSFORAMINAL BILATERAL L4/5 TF JASON DIRECT REFERRAL (Bilateral)    Final Diagnosis: Spondylolisthesis of lumbar region [M43.16]    Disposition: Home or self care    Patient Instructions:   Current Discharge Medication List      CONTINUE these medications which have NOT CHANGED    Details   amLODIPine (NORVASC) 5 MG tablet TAKE 1 TABLET (5 MG TOTAL) BY MOUTH ONCE DAILY.  Qty: 90 tablet, Refills: 2    Associated Diagnoses: Essential hypertension      atorvastatin (LIPITOR) 20 MG tablet TAKE 1 TABLET BY MOUTH EVERY DAY  Qty: 90 tablet, Refills: 3      cholecalciferol, vitamin D3, (VITAMIN D) 2,000 unit Cap Take 1 capsule by mouth Daily. Take vitamin D 2000 units every day.      diclofenac sodium (VOLTAREN) 1 % Gel Apply two grams to affected area 3-4 times as needed  Qty: 1 Tube, Refills: 3    Comments: 2grm per application per Nalini  Associated Diagnoses: Spondylolisthesis of lumbar region; Lumbar radiculopathy      escitalopram oxalate (LEXAPRO) 10 MG tablet TAKE 1 TABLET BY MOUTH EVERY DAY  Qty: 90 tablet, Refills: 3      gabapentin (NEURONTIN) 100 MG capsule Take 1 capsule (100 mg total) by mouth 3 (three) times daily.  Qty: 90 capsule, Refills: 0    Associated Diagnoses: Spondylolisthesis of lumbar region; Spinal stenosis of lumbar region with neurogenic claudication      LORazepam (ATIVAN) 0.5 MG tablet Take 1 tablet (0.5 mg total) by mouth daily as needed for Anxiety (as needed for anxiety no more than 2-3 x weekly).  Qty: 30 tablet, Refills: 1      meloxicam (MOBIC) 15 MG tablet TAKE 1 TABLET (15 MG TOTAL) BY MOUTH ONCE DAILY.  Qty: 30 tablet, Refills: 1      methocarbamol (ROBAXIN) 750 MG Tab TAKE 1 TABLET BY MOUTH THREE TIMES A DAY AS NEEDED FOR MUSCLE SPASMS  Qty: 60 tablet, Refills: 0                  Discharge Diagnosis: Spondylolisthesis of lumbar region [M43.16]  Condition on Discharge: Stable with no complications to procedure   Diet on Discharge: Same as before.  Activity: as per instruction sheet.  Discharge to: Home with a responsible adult.  Follow up: 2-4 weeks       Please call my office or pager at [phone] if experienced any weakness or loss of sensation, fever > 101.5, pain uncontrolled with oral medications, persistent nausea/vomiting/or diarrhea, redness or drainage from the incisions, or any other worrisome concerns. If physician on call was not reached or could not communicate with our office for any reason please go to the nearest emergency department

## 2020-01-16 NOTE — DISCHARGE INSTRUCTIONS

## 2020-03-16 DIAGNOSIS — M48.062 SPINAL STENOSIS OF LUMBAR REGION WITH NEUROGENIC CLAUDICATION: ICD-10-CM

## 2020-03-16 DIAGNOSIS — M43.16 SPONDYLOLISTHESIS OF LUMBAR REGION: Primary | ICD-10-CM

## 2020-04-09 ENCOUNTER — TELEPHONE (OUTPATIENT)
Dept: ORTHOPEDICS | Facility: CLINIC | Age: 60
End: 2020-04-09
Payer: COMMERCIAL

## 2020-04-09 NOTE — TELEPHONE ENCOUNTER
Left message for pt asking that she call us and let us know if the therapy helped with her pain or if she need us to reschedule her visit for early May.

## 2020-04-21 DIAGNOSIS — Z01.84 ANTIBODY RESPONSE EXAMINATION: ICD-10-CM

## 2020-05-21 DIAGNOSIS — Z01.84 ANTIBODY RESPONSE EXAMINATION: ICD-10-CM

## 2020-06-20 DIAGNOSIS — Z01.84 ANTIBODY RESPONSE EXAMINATION: ICD-10-CM

## 2020-06-25 RX ORDER — ATORVASTATIN CALCIUM 20 MG/1
TABLET, FILM COATED ORAL
Qty: 90 TABLET | Refills: 0 | Status: SHIPPED | OUTPATIENT
Start: 2020-06-25 | End: 2020-09-21

## 2020-07-20 DIAGNOSIS — Z01.84 ANTIBODY RESPONSE EXAMINATION: ICD-10-CM

## 2020-08-19 DIAGNOSIS — Z01.84 ANTIBODY RESPONSE EXAMINATION: ICD-10-CM

## 2020-08-28 DIAGNOSIS — Z12.39 BREAST CANCER SCREENING: ICD-10-CM

## 2020-09-18 DIAGNOSIS — Z01.84 ANTIBODY RESPONSE EXAMINATION: ICD-10-CM

## 2020-09-21 RX ORDER — ATORVASTATIN CALCIUM 20 MG/1
TABLET, FILM COATED ORAL
Qty: 90 TABLET | Refills: 3 | Status: SHIPPED | OUTPATIENT
Start: 2020-09-21 | End: 2022-02-21

## 2020-09-30 ENCOUNTER — TELEPHONE (OUTPATIENT)
Dept: INTERNAL MEDICINE | Facility: CLINIC | Age: 60
End: 2020-09-30

## 2020-09-30 DIAGNOSIS — M48.062 SPINAL STENOSIS OF LUMBAR REGION WITH NEUROGENIC CLAUDICATION: ICD-10-CM

## 2020-09-30 DIAGNOSIS — M43.16 SPONDYLOLISTHESIS OF LUMBAR REGION: Primary | ICD-10-CM

## 2020-09-30 NOTE — TELEPHONE ENCOUNTER
Call pt no answer left message to call back and let us know what prescriptions she needs refill

## 2020-09-30 NOTE — TELEPHONE ENCOUNTER
----- Message from Linn Alicea sent at 9/30/2020 12:19 PM CDT -----  Type:  Patient Returning Call    Who Called: pt   Who Left Message for Patient: pt   Does the patient know what this is regarding?: pt say [facility] FAX a paper for pt to get her med   Would the patient rather a call back or a response via [facility]?  Call   Best Call Back Number:[phone]  Additional Information:  call back

## 2020-10-02 DIAGNOSIS — I10 ESSENTIAL HYPERTENSION: ICD-10-CM

## 2020-10-02 RX ORDER — AMLODIPINE BESYLATE 5 MG/1
5 TABLET ORAL DAILY
Qty: 90 TABLET | Refills: 3 | Status: SHIPPED | OUTPATIENT
Start: 2020-10-02 | End: 2021-10-07 | Stop reason: SDUPTHER

## 2020-10-02 NOTE — TELEPHONE ENCOUNTER
----- Message from Rivera Babin sent at 10/2/2020 12:13 PM CDT -----  Regarding: Rx refill  Contact: Patient [phone]  RX request - refill or new RX.  Is this a refill or new RX:  Refill   RX name and strength:  amLODIPine (NORVASC) 5 MG tablet   Directions:   Is this a 30 day or 90 day RX:    Pharmacy name and phone # [id number] [place] - [address] AT [facility] [phone] (Phone) [phone] (Fax)    Comments:  Patient stating the pharmacy has been trying to refill the Rx, has two Tablets left, would like to refilled prior to closing, call to update. Would like a call back ASAP.    1/04/21 annual physical set    Please call an advise  Thank you

## 2020-10-05 RX ORDER — METHOCARBAMOL 750 MG/1
750 TABLET, FILM COATED ORAL 4 TIMES DAILY
Qty: 40 TABLET | Refills: 0 | Status: SHIPPED | OUTPATIENT
Start: 2020-10-05 | End: 2020-10-15

## 2020-10-05 NOTE — TELEPHONE ENCOUNTER
----- Message from Narcisa Solomon sent at 10/2/2020  5:14 PM CDT -----  Regarding: Requesting an refill please  Pt calling to request a refill methocarbamoL (ROBAXIN) 750 MG Tab    Send to [facility] #[id number] - [place], [place] - [address] [facility]    Pt is having a lot of muscle spasms    Pt can be reached at [phone].    Thank you!

## 2020-10-18 DIAGNOSIS — Z01.84 ANTIBODY RESPONSE EXAMINATION: ICD-10-CM

## 2020-10-21 ENCOUNTER — CLINICAL SUPPORT (OUTPATIENT)
Dept: REHABILITATION | Facility: HOSPITAL | Age: 60
End: 2020-10-21
Payer: COMMERCIAL

## 2020-10-21 DIAGNOSIS — G89.29 CHRONIC BILATERAL LOW BACK PAIN WITH BILATERAL SCIATICA: ICD-10-CM

## 2020-10-21 DIAGNOSIS — M54.42 CHRONIC BILATERAL LOW BACK PAIN WITH BILATERAL SCIATICA: ICD-10-CM

## 2020-10-21 DIAGNOSIS — M48.062 SPINAL STENOSIS OF LUMBAR REGION WITH NEUROGENIC CLAUDICATION: ICD-10-CM

## 2020-10-21 DIAGNOSIS — M54.41 CHRONIC BILATERAL LOW BACK PAIN WITH BILATERAL SCIATICA: ICD-10-CM

## 2020-10-21 DIAGNOSIS — M43.16 SPONDYLOLISTHESIS OF LUMBAR REGION: ICD-10-CM

## 2020-10-21 PROCEDURE — 97110 THERAPEUTIC EXERCISES: CPT | Mod: PN

## 2020-10-21 PROCEDURE — 97161 PT EVAL LOW COMPLEX 20 MIN: CPT | Mod: PN

## 2020-10-23 PROBLEM — M54.42 LOW BACK PAIN WITH BILATERAL SCIATICA: Status: ACTIVE | Noted: 2020-10-23

## 2020-10-23 PROBLEM — M54.41 LOW BACK PAIN WITH BILATERAL SCIATICA: Status: ACTIVE | Noted: 2020-10-23

## 2020-10-26 NOTE — PLAN OF CARE
[facility]  Physical Therapy Initial Evaluation    Date: 10/21/2020   Name: Buffy Morin  Clinic Number: [id number]    Therapy Diagnosis:   Encounter Diagnoses   Name Primary?    Spondylolisthesis of lumbar region     Spinal stenosis of lumbar region with neurogenic claudication     Chronic bilateral low back pain with bilateral sciatica      Physician: Nalini Lynn PA-C  Physician Orders: PT Eval and Treat   Medical Diagnosis from Referral:   M43.16 (ICD-10-CM) - Spondylolisthesis of lumbar region   M48.062 (ICD-10-CM) - Spinal stenosis of lumbar region with neurogenic claudication   Evaluation Date: 10/21/2020  Authorization Period Expiration: 12/31/2020  Plan of Care Expiration: 12/10/2020  Visit # / Visits authorized: 1/60    Time In: 3:10 pm  Time Out: 3:45 pm  Total Appointment Time (timed & untimed codes): 35 minutes (1 LCE) (1 TE)    Precautions: Standard and cancer risk    Subjective   Date of onset: ~ 1 year  History of current condition - Buffy reports: she has been having low back pain for approximately 1 year now. She had a lumbar transforaminal JASON in December 2019, as well as, January 2020 with no relief. Patient does not report a specific mechanism of injury. Reports pain will typically radiate down bilateral LE to lateral ankles. Aggravating factors include sitting for a prolonged amount of time, reaching for objects, bending over, and walking. Easing factors include taking medication for muscle spasms. Patient reports she will have times with bladder incontinence due to muscle spasms/pain.     Medical History:   Past Medical History:   Diagnosis Date    Abnormal Pap smear of cervix     Anxiety     At high risk for breast cancer: TC 7 > 20% 2019 2/6/2019    Carpal tunnel syndrome     Elevated blood pressure     Family history of breast cancer in sister 10/1/2013    Hypertension     S/P hysterectomy 9/23/2017       Surgical History:   Buffy Mikel   has a past surgical history that includes Tubal ligation; Breast biopsy (Right); Hysterectomy; Colonoscopy (N/A, 8/19/2019); Transforaminal epidural injection of steroid (Bilateral, 12/23/2019); and Transforaminal epidural injection of steroid (Bilateral, 1/16/2020).    Medications:   Buffy has a current medication list which includes the following prescription(s): amlodipine, atorvastatin, cholecalciferol (vitamin d3), diclofenac sodium, escitalopram oxalate, gabapentin, and lorazepam.    Allergies:   Review of patient's allergies indicates:   Allergen Reactions    No known drug allergies         Imaging, MRI studies: Lumbar spine (12/6/2020):  Impression:  1.  Grade 1 anterolisthesis of L4 on L5.  Multilevel degenerative changes of the lumbar spine, most pronounced at L4-L5 demonstrating moderate spinal canal stenosis as well as bilateral neural foraminal narrowing, left greater than right.  Please see above for level by level details.  2.  Mild chronic anterior wedging of the T12 vertebral body which may be degenerative in etiology, noting intervertebral disc space height loss at T11-T12 with associated chronic endplate degenerative changes.    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 Reduce pain and return to exercising   Objective   Observation:    Slumped posture     Gait:   Decreased hip EX    Posture:     Anterior pelvic tilt   Lumbar hyperlordosis    Lumbar Range of Motion:    Degrees Pain   Flexion  65 degrees    no pain, tightness reported    Extension  30 degrees    pain   Left Side Bending   "Limited  pain   Right Side Bending  Limited  pain   Left rotation    WNL   no pain   Right Rotation    WNL  no pain        Lower Extremity Strength  Right LE  Left LE    Knee extension: 5/5 Knee extension: 5/5   Knee flexion: 5/5 Knee flexion: 5/5   Hip flexion: 3+/5 * Hip flexion: 4/5   Hip extension:  4/5 Hip extension: 4/5   Hip abduction: 3+/5 Hip abduction: 4/5   Hip adduction: 4+/5 Hip adduction 4+/5   Ankle dorsiflexion: 5/5 Ankle dorsiflexion: 5/5   Ankle plantarflexion: 5/5 Ankle plantarflexion: 5/5       Special Tests:  - Flexion preference: yes  - Extension preference: no  - SLR test: (+)      DTR:   Right Left Comment   Patellar (L3-4) 2+ 2+    Achilles (S1) 2+ 2+        Neuro Dynamic Testing:    Sciatic nerve:      SLR:   R = (-)     L = (-)      Palpation:   TTP+ = tender to palpation   TTT++ = very tender to touch   Glute med TTP+    Piriformis TTP+    Sensation: WNL    Flexibility:   Right   Hip flexor tightness   Quadriceps tightness   Piriformis tightness   Lumbar erector tightness   QL tightness    Left   Hip flexor tightness   Quadriceps tightness   Piriformis tightness   Lumbar erector tightness    QL tightness      Limitation/Restriction for FOTO Lumbar Survey    Therapist reviewed FOTO scores for Buffy Morin on 10/21/2020.   FOTO documents entered into [employer] - see Media section.    Limitation Score: 56%  Predicted Limitation Score: 40%         TREATMENT   Treatment Time In: 3:35 pm  Treatment Time Out: 3:50 pm  Total Treatment time (time-based codes) separate from Evaluation: 15 minutes    Buffy received therapeutic exercises to develop strength, endurance, ROM, flexibility, posture, and core stabilization for 15 minutes including:   LTR    20 x   DKTC    10" x 5   SKTC    10" x 5    Home Exercises and Patient Education Provided    Education provided:   - HEP  - Prognosis/POC  - Pain science    Written Home Exercises Provided: no.  Exercises were reviewed and Buffy was able to demonstrate " them prior to the end of the session.  Buffy demonstrated good  understanding of the education provided.     See EMR under Media for exercises provided next visit.    Assessment   Buffy is a 59 y.o. female referred to outpatient Physical Therapy with a medical diagnosis of spondylolisthesis of the lumbar spine and spinal stenosis. With MRI studies, patient shows grade 1 anterolisthesis of L4 on L5, as well as, spinal canal stenosis and bilateral neural foraminal narrowing (left greater than right) and anterior wedging of the T12 vertebral body which may be degenerative in etiology, noting intervertebral disc space height loss at T11-T12. Patient has signs and symptoms presenting as low back pain with flexion preference due to combination of anterolisthesis and neural foraminal narrowing. Pt presents with painful lumbar AROM associated with central and lateral vertebral canal closure during extension and sidebending. Patient also presents with gluteus medius/ashu weakness, tightness of hip flexors, lumbar erectors, and hip musculature,  and functional limitations of sitting for a prolonged amount of time, reaching for objects, bending over, and walking. Patient would benefit from skilled PT to address these limitations and regain pain-free lumbar AROM along with LE and core stability training.     Pt to be seen 2x/week for 6 weeks     Pt prognosis is Good.   Pt will benefit from skilled outpatient Physical Therapy to address the deficits stated above and in the chart below, provide pt/family education, and to maximize pt's level of independence.     Plan of care discussed with patient: Yes  Pt's spiritual, cultural and educational needs considered and patient is agreeable to the plan of care and goals as stated below:     Anticipated Barriers for therapy: > BMI    Medical Necessity is demonstrated by the following  History  Co-morbidities and personal factors that may impact the plan of care Co-morbidities:    advanced age, anxiety, high BMI, HTN and prior pelvic surgery    Personal Factors:   age  lifestyle     high   Examination  Body Structures and Functions, activity limitations and participation restrictions that may impact the plan of care Body Regions:   back  lower extremities  trunk    Body Systems:    gross symmetry  ROM  strength  gross coordinated movement  balance  gait  transfers  transitions  motor control  motor learning    Participation Restrictions:   sitting for a prolonged amount of time, reaching for objects, bending over, and walking    Activity limitations:   Learning and applying knowledge  no deficits    General Tasks and Commands  no deficits    Communication  no deficits    Mobility  lifting and carrying objects  walking    Self care  no deficits    Domestic Life  doing house work (cleaning house, washing dishes, laundry)  assisting others    Interactions/Relationships  no deficits    Life Areas  no deficits    Community and Social Life  no deficits         high   Clinical Presentation stable and uncomplicated low   Decision Making/ Complexity Score: low     Goals:  Short Term Goals: 4 weeks  1. Patient will be independent with HEP in order to supplement pain free lumbar ROM - PROGRESSING, NOT MET  2. Pt will improve hip flexor flexibility to WNL to promote functional mobility - PROGRESSING, NOT MET  3. Patient will improve Modified Oswestry Disability Index score from 42.8% to </= 10% limitation in order to supplement functional independence- PROGRESSING, NOT MET     Long Term Goals: 8 weeks   1. Pt will improve lumbar FOTO survey to </= 40% limited in order to return to ADLs without limitation - PROGRESSING, NOT MET  2. Patient will improve gluteus medisu strength from 3+/5 to a 5/5 bilaterally for improved trunk support. - PROGRESSING, NOT MET  3. Pt will report no pain during lumbar AROM in order to promote functional mobility - PROGRESSING, NOT MET    Plan   Plan of care Certification:  10/21/2020 to 12/10/2020.    Outpatient Physical Therapy 2 times weekly for 6 weeks to include the following interventions: Cervical/Lumbar Traction, Gait Training, Manual Therapy, Moist Heat/ Ice, Neuromuscular Re-ed, Patient Education, Self Care, Therapeutic Activites and Therapeutic Exercise.     Hitesh Butler, PT

## 2020-11-06 ENCOUNTER — CLINICAL SUPPORT (OUTPATIENT)
Dept: REHABILITATION | Facility: HOSPITAL | Age: 60
End: 2020-11-06
Payer: COMMERCIAL

## 2020-11-06 DIAGNOSIS — M54.42 CHRONIC BILATERAL LOW BACK PAIN WITH BILATERAL SCIATICA: ICD-10-CM

## 2020-11-06 DIAGNOSIS — G89.29 CHRONIC BILATERAL LOW BACK PAIN WITH BILATERAL SCIATICA: ICD-10-CM

## 2020-11-06 DIAGNOSIS — M54.41 CHRONIC BILATERAL LOW BACK PAIN WITH BILATERAL SCIATICA: ICD-10-CM

## 2020-11-06 PROCEDURE — 97110 THERAPEUTIC EXERCISES: CPT | Mod: PN

## 2020-11-06 NOTE — PROGRESS NOTES
"  Physical Therapy Treatment Note     Name: Buffy Morin  Clinic Number: [id number]    Therapy Diagnosis:   Encounter Diagnosis   Name Primary?    Chronic bilateral low back pain with bilateral sciatica      Physician: Nalini Lynn PA-C    Visit Date: 11/6/2020    Physician: Nalini Lynn PA-C  Physician Orders: PT Eval and Treat   Medical Diagnosis from Referral:   M43.16 (ICD-10-CM) - Spondylolisthesis of lumbar region   M48.062 (ICD-10-CM) - Spinal stenosis of lumbar region with neurogenic claudication   Evaluation Date: 10/21/2020  Authorization Period Expiration: 12/31/2020  Plan of Care Expiration: 12/10/2020  Visit # / Visits authorized: 2/60     Time In: 3:00 pm  Time Out: 3:45 pm  Total Billable Time: 45 minutes    Precautions: Standard and and cancer risk    Subjective     Pt reports: she has been having a lot of low back pain recently and bilateral leg pain. Pt reports primary complaint is (B) leg pain.   She was compliant with home exercise program.  Response to previous treatment: minimal change   Functional change: minimal change     Pain: 9/10  Location: bilateral low back and bilateral leg pain     Objective     Buffy received therapeutic exercises to develop strength, endurance, ROM, flexibility and core stabilization for 45 minutes including:   LTR     20 x    Supine sciatic nerve glides  20 x    Hip ADD with TrA contraction  5" x 20   Hip ABD - GTB   5" x 20   SKTC     10" x 10   Standing hip flexor stretch  3 x 30"   Nu-step    8' x L3 for endurance, strength, and reciprocal motion    Home Exercises Provided and Patient Education Provided     Education provided:   - HEP    Written Home Exercises Provided: yes.  Exercises were reviewed and Buffy was able to demonstrate them prior to the end of the session.  Buffy demonstrated good  understanding of the education provided.     See EMR under Media for exercises provided 11/6/2020.    Assessment   Patient presented to physical " therapy for her first treatment following her initial evaluation with reports of 9/10 low back/(B) leg pain. Patient was provided patient education on performing LTR with decreased knee/hip flexion and pain-free range. Sciatic nerve glides, as well as, LTR provided low back/leg pain relief per patient report. Overall, patient tolerated treatment well and will continue to benefit from skilled PT focusing on lumbar stabilization, pain-free lumbar AROM, and LE strengthening. Patient's imaging displays grade 1 anterolisthesis of L4 on L5 along with bilateral neural foraminal narrowing and spinal stenosis making her a flexion preference patient.      Buffy is progressing well towards her goals.   Pt prognosis is Good.     Pt will continue to benefit from skilled outpatient physical therapy to address the deficits listed in the problem list box on initial evaluation, provide pt/family education and to maximize pt's level of independence in the home and community environment.     Pt's spiritual, cultural and educational needs considered and pt agreeable to plan of care and goals.     Anticipated barriers to physical therapy: > BMI    Goals:   Goals:  Short Term Goals: 4 weeks   1. Patient will be independent with HEP in order to supplement pain free lumbar ROM - MET (11/6/2020)  2. Pt will improve hip flexor flexibility to WNL to promote functional mobility - PROGRESSING, NOT MET  3. Patient will improve Modified Oswestry Disability Index score from 42.8% to </= 10% limitation in order to supplement functional independence- PROGRESSING, NOT MET     Long Term Goals: 8 weeks   1. Pt will improve lumbar FOTO survey to </= 40% limited in order to return to ADLs without limitation - PROGRESSING, NOT MET  2. Patient will improve gluteus medisu strength from 3+/5 to a 5/5 bilaterally for improved trunk support. - PROGRESSING, NOT MET  3. Pt will report no pain during lumbar AROM in order to promote functional mobility -  PROGRESSING, NOT MET    Plan     Plan is to continue with flexion biased low back stability and mobility program.     Hitesh Butler, PT

## 2020-11-12 ENCOUNTER — CLINICAL SUPPORT (OUTPATIENT)
Dept: REHABILITATION | Facility: HOSPITAL | Age: 60
End: 2020-11-12
Payer: COMMERCIAL

## 2020-11-12 DIAGNOSIS — M54.41 CHRONIC BILATERAL LOW BACK PAIN WITH BILATERAL SCIATICA: ICD-10-CM

## 2020-11-12 DIAGNOSIS — M54.42 CHRONIC BILATERAL LOW BACK PAIN WITH BILATERAL SCIATICA: ICD-10-CM

## 2020-11-12 DIAGNOSIS — G89.29 CHRONIC BILATERAL LOW BACK PAIN WITH BILATERAL SCIATICA: ICD-10-CM

## 2020-11-12 PROCEDURE — 97110 THERAPEUTIC EXERCISES: CPT | Mod: PN,CQ

## 2020-11-12 PROCEDURE — 97140 MANUAL THERAPY 1/> REGIONS: CPT | Mod: PN,CQ

## 2020-11-12 NOTE — PROGRESS NOTES
"  Physical Therapy Treatment Note     Name: Buffy Morin  [facility] Number: [id number]    Therapy Diagnosis:   Encounter Diagnosis   Name Primary?    Chronic bilateral low back pain with bilateral sciatica      Physician: Nalini Lynn PA-C    Visit Date: 11/12/2020    Physician: Nalini Lynn PA-C  Physician Orders: PT Eval and Treat   Medical Diagnosis from Referral:   M43.16 (ICD-10-CM) - Spondylolisthesis of lumbar region   M48.062 (ICD-10-CM) - Spinal stenosis of lumbar region with neurogenic claudication   Evaluation Date: 10/21/2020  Authorization Period Expiration: 12/31/2020  Plan of Care Expiration: 12/10/2020  Visit # / Visits authorized: 3/60     Time In: 3:30 pm  Time Out: 4:10 pm  Total Billable Time: 40 minutes    Precautions: Standard and and cancer risk    Subjective     Pt reports: she continues to experience a lot of low back pain and LE pain  but less than last session. Pt reports primary complaint is (B) leg pain. Really feels like previous adjustments to her exercises were helpful.  She was compliant with home exercise program.  Response to previous treatment: tolerated well with new exercises   Functional change:  A little less pain    Pain: 6/10  Location: bilateral low back and bilateral leg pain     Objective     Buffy received therapeutic exercises to develop strength, endurance, ROM, flexibility and core stabilization for 30 minutes including:   LTR     20 x    Supine sciatic nerve glides  20 x    Hip ADD with TrA contraction  5" x 20 (small yellow ball between knees)   Hip ABD - GTB   5" x 20   SKTC     10" x 10   Standing hip flexor stretch  3 x 30"              Swiss ball roll outs                              2 minutes               Prone hip flexor stretch                  3x30" w/strap at ankle   Nu-step    8' x L4 for endurance, strength, and reciprocal motion      Buffy received the following manual therapy techniques: Myofacial release and Soft tissue Mobilization " were applied to the: B quad and IT band for 10 minutes, including:   with foam roller In [place] lying with target leg over edge of high low but resting on foot stool no dangling         Home Exercises Provided and Patient Education Provided     Education provided:   - HEP    Written Home Exercises Provided: yes.  Exercises were reviewed and Buffy was able to demonstrate them prior to the end of the session.  Buffy demonstrated good  understanding of the education provided.     See EMR under Media for exercises provided 11/6/2020.    Assessment   Patient presented clinic following last session with reports of less 6/10 low back/(B) leg pain. Patient was provided patient education on performing LTR with decreased knee/hip flexion and pain-free range. Tolerated  treatment well and will continue to benefit from skilled PT focusing on lumbar stabilization, pain-free lumbar AROM, and LE strengthening.  Continue flexion preference conditions  Good tolerance and benefits of prone quad stretch. Added to HEP and provided printed copy. Reports signifcantly reduced pain 2/10 following today;'s interventions     Buffy is progressing well towards her goals.   Pt prognosis is Good.     Pt will continue to benefit from skilled outpatient physical therapy to address the deficits listed in the problem list box on initial evaluation, provide pt/family education and to maximize pt's level of independence in the home and community environment.     Pt's spiritual, cultural and educational needs considered and pt agreeable to plan of care and goals.     Anticipated barriers to physical therapy: > BMI    Goals:   Goals:  Short Term Goals: 4 weeks   1. Patient will be independent with HEP in order to supplement pain free lumbar ROM - MET (11/6/2020)  2. Pt will improve hip flexor flexibility to WNL to promote functional mobility - PROGRESSING, NOT MET  3. Patient will improve Modified Oswestry Disability Index score from 42.8% to </= 10%  limitation in order to supplement functional independence- PROGRESSING, NOT MET     Long Term Goals: 8 weeks   1. Pt will improve lumbar FOTO survey to </= 40% limited in order to return to ADLs without limitation - PROGRESSING, NOT MET  2. Patient will improve gluteus medisu strength from 3+/5 to a 5/5 bilaterally for improved trunk support. - PROGRESSING, NOT MET  3. Pt will report no pain during lumbar AROM in order to promote functional mobility - PROGRESSING, NOT MET    Plan     Plan is to continue with flexion biased low back stability and mobility program.     Nolan Ortiz, PTA

## 2020-11-17 DIAGNOSIS — Z01.84 ANTIBODY RESPONSE EXAMINATION: ICD-10-CM

## 2020-11-18 ENCOUNTER — CLINICAL SUPPORT (OUTPATIENT)
Dept: REHABILITATION | Facility: HOSPITAL | Age: 60
End: 2020-11-18
Payer: COMMERCIAL

## 2020-11-18 DIAGNOSIS — G89.29 CHRONIC BILATERAL LOW BACK PAIN WITH BILATERAL SCIATICA: ICD-10-CM

## 2020-11-18 DIAGNOSIS — M54.41 CHRONIC BILATERAL LOW BACK PAIN WITH BILATERAL SCIATICA: ICD-10-CM

## 2020-11-18 DIAGNOSIS — M54.42 CHRONIC BILATERAL LOW BACK PAIN WITH BILATERAL SCIATICA: ICD-10-CM

## 2020-11-18 PROCEDURE — 97140 MANUAL THERAPY 1/> REGIONS: CPT | Mod: PN

## 2020-11-18 PROCEDURE — 97110 THERAPEUTIC EXERCISES: CPT | Mod: PN

## 2020-11-18 NOTE — PROGRESS NOTES
"  Physical Therapy Treatment Note     Name: Buffy Morin  [facility] Number: [id number]    Therapy Diagnosis:   Encounter Diagnosis   Name Primary?    Chronic bilateral low back pain with bilateral sciatica      Physician: Nalini Lynn PA-C    Visit Date: 11/18/2020    Physician: Nalini Lynn PA-C  Physician Orders: PT Eval and Treat   Medical Diagnosis from Referral:   M43.16 (ICD-10-CM) - Spondylolisthesis of lumbar region   M48.062 (ICD-10-CM) - Spinal stenosis of lumbar region with neurogenic claudication   Evaluation Date: 10/21/2020  Authorization Period Expiration: 12/31/2020  Plan of Care Expiration: 12/10/2020  Visit # / Visits authorized: 4/60     Time In: 3:00 pm  Time Out: 3:45 pm  Total Billable Time: 45 minutes (2 TE) (1 MT)    Precautions: Standard and and cancer risk    Subjective     Pt reports: she feels she is doing better overall; although, she continues to wake up in the morning with intense muscle spasms, aches, and pains in her low back and (B) legs  She was compliant with home exercise program.  Response to previous treatment: tolerated well with new exercises   Functional change:  A little less pain    Pain: 5/10  Location: bilateral low back and bilateral leg pain     Objective     Buffy received therapeutic exercises to develop strength, endurance, ROM, flexibility and core stabilization for 30 minutes including:   LTR     20 x    Posterior pelvic tilts   10 x   SKTC     10" x 10    Hip ADD with TrA contraction  5" x 20    Hip ABD - GTB   5" x 20              Swiss ball roll outs   2 minutes               Prone hip flexor stretch  3 x 30"   Nu-step    5' x L4 for endurance, strength, and reciprocal motion     Buffy received the following manual therapy techniques: Manual traction were applied to the: low back for 10 minutes, including:   Manual lumbar traction             Home Exercises Provided and Patient Education Provided     Education provided:   - HEP    Written Home " Exercises Provided: yes.  Exercises were reviewed and Buffy was able to demonstrate them prior to the end of the session.  Buffy demonstrated good  understanding of the education provided.     See EMR under Media for exercises provided 11/6/2020.    Assessment   Patient presented clinic following last session with reports of 5/10 low back/(B) leg pain; although, she reports primary complaint as waking up in the morning with intense low back/(B) leg muscle spasms and pain. Patient was provided patient education on performing LTR with decreased knee/hip flexion and pain-free range. Tolerated  treatment well and will continue to benefit from skilled PT focusing on lumbar stabilization, pain-free lumbar AROM, and LE strengthening.  Continue flexion preference conditions. Good tolerance and benefits of prone quad stretch.     Buffy is progressing well towards her goals.   Pt prognosis is Good.     Pt will continue to benefit from skilled outpatient physical therapy to address the deficits listed in the problem list box on initial evaluation, provide pt/family education and to maximize pt's level of independence in the home and community environment.     Pt's spiritual, cultural and educational needs considered and pt agreeable to plan of care and goals.     Anticipated barriers to physical therapy: > BMI    Goals:   Goals:  Short Term Goals: 4 weeks   1. Patient will be independent with HEP in order to supplement pain free lumbar ROM - MET (11/6/2020)  2. Pt will improve hip flexor flexibility to WNL to promote functional mobility - PROGRESSING, NOT MET  3. Patient will improve Modified Oswestry Disability Index score from 42.8% to </= 10% limitation in order to supplement functional independence- PROGRESSING, NOT MET     Long Term Goals: 8 weeks   1. Pt will improve lumbar FOTO survey to </= 40% limited in order to return to ADLs without limitation - PROGRESSING, NOT MET  2. Patient will improve gluteus medisu  strength from 3+/5 to a 5/5 bilaterally for improved trunk support. - PROGRESSING, NOT MET  3. Pt will report no pain during lumbar AROM in order to promote functional mobility - PROGRESSING, NOT MET    Plan     Plan is to continue with flexion biased low back stability and mobility program.     Hitesh Butler, PT

## 2020-12-30 ENCOUNTER — TELEPHONE (OUTPATIENT)
Dept: ORTHOPEDICS | Facility: CLINIC | Age: 60
End: 2020-12-30

## 2020-12-30 DIAGNOSIS — M51.36 DDD (DEGENERATIVE DISC DISEASE), LUMBAR: Primary | ICD-10-CM

## 2020-12-30 PROBLEM — M54.41 LOW BACK PAIN WITH BILATERAL SCIATICA: Status: RESOLVED | Noted: 2020-10-23 | Resolved: 2020-12-30

## 2020-12-30 PROBLEM — M54.42 LOW BACK PAIN WITH BILATERAL SCIATICA: Status: RESOLVED | Noted: 2020-10-23 | Resolved: 2020-12-30

## 2021-01-04 ENCOUNTER — OFFICE VISIT (OUTPATIENT)
Dept: INTERNAL MEDICINE | Facility: CLINIC | Age: 61
End: 2021-01-04
Payer: COMMERCIAL

## 2021-01-04 ENCOUNTER — HOSPITAL ENCOUNTER (OUTPATIENT)
Dept: RADIOLOGY | Facility: HOSPITAL | Age: 61
Discharge: HOME | End: 2021-01-04
Attending: PHYSICIAN ASSISTANT
Payer: COMMERCIAL

## 2021-01-04 VITALS
BODY MASS INDEX: 29.59 KG/M2 | HEIGHT: 63 IN | SYSTOLIC BLOOD PRESSURE: 120 MMHG | WEIGHT: 167 LBS | DIASTOLIC BLOOD PRESSURE: 80 MMHG

## 2021-01-04 DIAGNOSIS — E66.3 OVERWEIGHT (BMI 25.0-29.9): ICD-10-CM

## 2021-01-04 DIAGNOSIS — Z91.89 AT HIGH RISK FOR BREAST CANCER: ICD-10-CM

## 2021-01-04 DIAGNOSIS — F41.9 ANXIETY: ICD-10-CM

## 2021-01-04 DIAGNOSIS — I10 ESSENTIAL HYPERTENSION: ICD-10-CM

## 2021-01-04 DIAGNOSIS — M54.16 LUMBAR RADICULOPATHY: ICD-10-CM

## 2021-01-04 DIAGNOSIS — E55.9 MILD VITAMIN D DEFICIENCY: ICD-10-CM

## 2021-01-04 DIAGNOSIS — M51.36 DDD (DEGENERATIVE DISC DISEASE), LUMBAR: ICD-10-CM

## 2021-01-04 DIAGNOSIS — E78.2 MIXED HYPERLIPIDEMIA: ICD-10-CM

## 2021-01-04 DIAGNOSIS — Z00.00 ANNUAL PHYSICAL EXAM: Primary | ICD-10-CM

## 2021-01-04 PROBLEM — Z12.11 ENCOUNTER FOR SCREENING COLONOSCOPY: Status: RESOLVED | Noted: 2019-08-19 | Resolved: 2021-01-04

## 2021-01-04 PROCEDURE — 3074F PR MOST RECENT SYSTOLIC BLOOD PRESSURE < 130 MM HG: ICD-10-PCS | Mod: CPTII,S$GLB,, | Performed by: INTERNAL MEDICINE

## 2021-01-04 PROCEDURE — 72120 X-RAY BEND ONLY L-S SPINE: CPT | Mod: 26,,, | Performed by: RADIOLOGY

## 2021-01-04 PROCEDURE — 99396 PR PREVENTIVE VISIT,EST,40-64: ICD-10-PCS | Mod: S$GLB,,, | Performed by: INTERNAL MEDICINE

## 2021-01-04 PROCEDURE — 99999 PR PBB SHADOW E&M-EST. PATIENT-LVL III: CPT | Mod: PBBFAC,,, | Performed by: INTERNAL MEDICINE

## 2021-01-04 PROCEDURE — 1125F AMNT PAIN NOTED PAIN PRSNT: CPT | Mod: S$GLB,,, | Performed by: INTERNAL MEDICINE

## 2021-01-04 PROCEDURE — 3008F BODY MASS INDEX DOCD: CPT | Mod: CPTII,S$GLB,, | Performed by: INTERNAL MEDICINE

## 2021-01-04 PROCEDURE — 3074F SYST BP LT 130 MM HG: CPT | Mod: CPTII,S$GLB,, | Performed by: INTERNAL MEDICINE

## 2021-01-04 PROCEDURE — 3008F PR BODY MASS INDEX (BMI) DOCUMENTED: ICD-10-PCS | Mod: CPTII,S$GLB,, | Performed by: INTERNAL MEDICINE

## 2021-01-04 PROCEDURE — 72100 XR LUMBAR SPINE AP AND LAT WITH FLEX/EXT: ICD-10-PCS | Mod: 26,,, | Performed by: RADIOLOGY

## 2021-01-04 PROCEDURE — 72100 X-RAY EXAM L-S SPINE 2/3 VWS: CPT | Mod: 26,,, | Performed by: RADIOLOGY

## 2021-01-04 PROCEDURE — 72120 X-RAY BEND ONLY L-S SPINE: CPT | Mod: TC

## 2021-01-04 PROCEDURE — 3079F DIAST BP 80-89 MM HG: CPT | Mod: CPTII,S$GLB,, | Performed by: INTERNAL MEDICINE

## 2021-01-04 PROCEDURE — 99999 PR PBB SHADOW E&M-EST. PATIENT-LVL III: ICD-10-PCS | Mod: PBBFAC,,, | Performed by: INTERNAL MEDICINE

## 2021-01-04 PROCEDURE — 3079F PR MOST RECENT DIASTOLIC BLOOD PRESSURE 80-89 MM HG: ICD-10-PCS | Mod: CPTII,S$GLB,, | Performed by: INTERNAL MEDICINE

## 2021-01-04 PROCEDURE — 1125F PR PAIN SEVERITY QUANTIFIED, PAIN PRESENT: ICD-10-PCS | Mod: S$GLB,,, | Performed by: INTERNAL MEDICINE

## 2021-01-04 PROCEDURE — 72120 XR LUMBAR SPINE AP AND LAT WITH FLEX/EXT: ICD-10-PCS | Mod: 26,,, | Performed by: RADIOLOGY

## 2021-01-04 PROCEDURE — 99396 PREV VISIT EST AGE 40-64: CPT | Mod: S$GLB,,, | Performed by: INTERNAL MEDICINE

## 2021-01-04 RX ORDER — LORAZEPAM 0.5 MG/1
0.5 TABLET ORAL DAILY PRN
Qty: 30 TABLET | Refills: 1 | Status: SHIPPED | OUTPATIENT
Start: 2021-01-04 | End: 2021-03-02

## 2021-01-04 RX ORDER — IBUPROFEN 800 MG/1
800 TABLET ORAL EVERY 8 HOURS PRN
COMMUNITY
Start: 2020-10-07 | End: 2022-06-29

## 2021-01-09 ENCOUNTER — LAB VISIT (OUTPATIENT)
Dept: LAB | Facility: HOSPITAL | Age: 61
End: 2021-01-09
Attending: INTERNAL MEDICINE
Payer: COMMERCIAL

## 2021-01-09 DIAGNOSIS — Z00.00 ANNUAL PHYSICAL EXAM: ICD-10-CM

## 2021-01-09 LAB
25(OH)D3+25(OH)D2 SERPL-MCNC: 44 NG/ML (ref 30–96)
ALBUMIN SERPL BCP-MCNC: 4.3 G/DL (ref 3.5–5.2)
ALP SERPL-CCNC: 127 U/L (ref 55–135)
ALT SERPL W/O P-5'-P-CCNC: 24 U/L (ref 10–44)
ANION GAP SERPL CALC-SCNC: 8 MMOL/L (ref 8–16)
AST SERPL-CCNC: 24 U/L (ref 10–40)
BASOPHILS # BLD AUTO: 0.04 K/UL (ref 0–0.2)
BASOPHILS NFR BLD: 0.9 % (ref 0–1.9)
BILIRUB SERPL-MCNC: 0.4 MG/DL (ref 0.1–1)
BUN SERPL-MCNC: 11 MG/DL (ref 8–23)
CALCIUM SERPL-MCNC: 9.5 MG/DL (ref 8.7–10.5)
CHLORIDE SERPL-SCNC: 104 MMOL/L (ref 95–110)
CHOLEST SERPL-MCNC: 181 MG/DL (ref 120–199)
CHOLEST/HDLC SERPL: 2.6 {RATIO} (ref 2–5)
CO2 SERPL-SCNC: 27 MMOL/L (ref 23–29)
CREAT SERPL-MCNC: 0.8 MG/DL (ref 0.5–1.4)
DIFFERENTIAL METHOD: ABNORMAL
EOSINOPHIL # BLD AUTO: 0.1 K/UL (ref 0–0.5)
EOSINOPHIL NFR BLD: 2 % (ref 0–8)
ERYTHROCYTE [DISTWIDTH] IN BLOOD BY AUTOMATED COUNT: 14.7 % (ref 11.5–14.5)
EST. GFR  (AFRICAN AMERICAN): >60 ML/MIN/1.73 M^2
EST. GFR  (NON AFRICAN AMERICAN): >60 ML/MIN/1.73 M^2
ESTIMATED AVG GLUCOSE: 126 MG/DL (ref 68–131)
GLUCOSE SERPL-MCNC: 86 MG/DL (ref 70–110)
HBA1C MFR BLD HPLC: 6 % (ref 4–5.6)
HCT VFR BLD AUTO: 41.8 % (ref 37–48.5)
HDLC SERPL-MCNC: 70 MG/DL (ref 40–75)
HDLC SERPL: 38.7 % (ref 20–50)
HGB BLD-MCNC: 12.5 G/DL (ref 12–16)
IMM GRANULOCYTES # BLD AUTO: 0.01 K/UL (ref 0–0.04)
IMM GRANULOCYTES NFR BLD AUTO: 0.2 % (ref 0–0.5)
LDLC SERPL CALC-MCNC: 101.2 MG/DL (ref 63–159)
LYMPHOCYTES # BLD AUTO: 2.2 K/UL (ref 1–4.8)
LYMPHOCYTES NFR BLD: 48.9 % (ref 18–48)
MCH RBC QN AUTO: 26.2 PG (ref 27–31)
MCHC RBC AUTO-ENTMCNC: 29.9 G/DL (ref 32–36)
MCV RBC AUTO: 88 FL (ref 82–98)
MONOCYTES # BLD AUTO: 0.3 K/UL (ref 0.3–1)
MONOCYTES NFR BLD: 6.8 % (ref 4–15)
NEUTROPHILS # BLD AUTO: 1.8 K/UL (ref 1.8–7.7)
NEUTROPHILS NFR BLD: 41.2 % (ref 38–73)
NONHDLC SERPL-MCNC: 111 MG/DL
NRBC BLD-RTO: 0 /100 WBC
PLATELET # BLD AUTO: 286 K/UL (ref 150–350)
PMV BLD AUTO: 10.6 FL (ref 9.2–12.9)
POTASSIUM SERPL-SCNC: 4.1 MMOL/L (ref 3.5–5.1)
PROT SERPL-MCNC: 8.2 G/DL (ref 6–8.4)
RBC # BLD AUTO: 4.77 M/UL (ref 4–5.4)
SODIUM SERPL-SCNC: 139 MMOL/L (ref 136–145)
TRIGL SERPL-MCNC: 49 MG/DL (ref 30–150)
TSH SERPL DL<=0.005 MIU/L-ACNC: 0.82 UIU/ML (ref 0.4–4)
WBC # BLD AUTO: 4.42 K/UL (ref 3.9–12.7)

## 2021-01-09 PROCEDURE — 80061 LIPID PANEL: CPT

## 2021-01-09 PROCEDURE — 36415 COLL VENOUS BLD VENIPUNCTURE: CPT

## 2021-01-09 PROCEDURE — 82306 VITAMIN D 25 HYDROXY: CPT

## 2021-01-09 PROCEDURE — 80053 COMPREHEN METABOLIC PANEL: CPT

## 2021-01-09 PROCEDURE — 83036 HEMOGLOBIN GLYCOSYLATED A1C: CPT

## 2021-01-09 PROCEDURE — 84443 ASSAY THYROID STIM HORMONE: CPT

## 2021-01-09 PROCEDURE — 85025 COMPLETE CBC W/AUTO DIFF WBC: CPT

## 2021-01-11 ENCOUNTER — TELEPHONE (OUTPATIENT)
Dept: INTERNAL MEDICINE | Facility: CLINIC | Age: 61
End: 2021-01-11

## 2021-01-11 ENCOUNTER — PATIENT OUTREACH (OUTPATIENT)
Dept: ADMINISTRATIVE | Facility: OTHER | Age: 61
End: 2021-01-11

## 2021-01-11 DIAGNOSIS — E78.2 MIXED HYPERLIPIDEMIA: Primary | ICD-10-CM

## 2021-01-12 ENCOUNTER — OFFICE VISIT (OUTPATIENT)
Dept: ORTHOPEDICS | Facility: CLINIC | Age: 61
End: 2021-01-12
Payer: COMMERCIAL

## 2021-01-12 VITALS — BODY MASS INDEX: 29.31 KG/M2 | HEIGHT: 63 IN | WEIGHT: 165.44 LBS

## 2021-01-12 DIAGNOSIS — M48.062 SPINAL STENOSIS OF LUMBAR REGION WITH NEUROGENIC CLAUDICATION: ICD-10-CM

## 2021-01-12 DIAGNOSIS — M43.16 SPONDYLOLISTHESIS OF LUMBAR REGION: Primary | ICD-10-CM

## 2021-01-12 PROCEDURE — 99999 PR PBB SHADOW E&M-EST. PATIENT-LVL III: ICD-10-PCS | Mod: PBBFAC,,, | Performed by: PHYSICIAN ASSISTANT

## 2021-01-12 PROCEDURE — 1125F AMNT PAIN NOTED PAIN PRSNT: CPT | Mod: S$GLB,,, | Performed by: PHYSICIAN ASSISTANT

## 2021-01-12 PROCEDURE — 99999 PR PBB SHADOW E&M-EST. PATIENT-LVL III: CPT | Mod: PBBFAC,,, | Performed by: PHYSICIAN ASSISTANT

## 2021-01-12 PROCEDURE — 99213 PR OFFICE/OUTPT VISIT, EST, LEVL III, 20-29 MIN: ICD-10-PCS | Mod: S$GLB,,, | Performed by: PHYSICIAN ASSISTANT

## 2021-01-12 PROCEDURE — 1125F PR PAIN SEVERITY QUANTIFIED, PAIN PRESENT: ICD-10-PCS | Mod: S$GLB,,, | Performed by: PHYSICIAN ASSISTANT

## 2021-01-12 PROCEDURE — 3008F BODY MASS INDEX DOCD: CPT | Mod: CPTII,S$GLB,, | Performed by: PHYSICIAN ASSISTANT

## 2021-01-12 PROCEDURE — 3008F PR BODY MASS INDEX (BMI) DOCUMENTED: ICD-10-PCS | Mod: CPTII,S$GLB,, | Performed by: PHYSICIAN ASSISTANT

## 2021-01-12 PROCEDURE — 99213 OFFICE O/P EST LOW 20 MIN: CPT | Mod: S$GLB,,, | Performed by: PHYSICIAN ASSISTANT

## 2021-01-12 RX ORDER — METHOCARBAMOL 750 MG/1
750 TABLET, FILM COATED ORAL 3 TIMES DAILY
Qty: 60 TABLET | Refills: 0 | Status: SHIPPED | OUTPATIENT
Start: 2021-01-12 | End: 2021-02-01

## 2021-02-01 ENCOUNTER — PATIENT MESSAGE (OUTPATIENT)
Dept: ORTHOPEDICS | Facility: CLINIC | Age: 61
End: 2021-02-01

## 2021-02-03 ENCOUNTER — HOSPITAL ENCOUNTER (OUTPATIENT)
Dept: RADIOLOGY | Facility: HOSPITAL | Age: 61
Discharge: HOME | End: 2021-02-03
Attending: INTERNAL MEDICINE
Payer: COMMERCIAL

## 2021-02-03 DIAGNOSIS — Z12.39 BREAST CANCER SCREENING: ICD-10-CM

## 2021-02-03 PROCEDURE — 77063 BREAST TOMOSYNTHESIS BI: CPT | Mod: 26,,, | Performed by: RADIOLOGY

## 2021-02-03 PROCEDURE — 77067 SCR MAMMO BI INCL CAD: CPT | Mod: 26,,, | Performed by: RADIOLOGY

## 2021-02-03 PROCEDURE — 77063 MAMMO DIGITAL SCREENING BILAT WITH TOMO: ICD-10-PCS | Mod: 26,,, | Performed by: RADIOLOGY

## 2021-02-03 PROCEDURE — 77067 MAMMO DIGITAL SCREENING BILAT WITH TOMO: ICD-10-PCS | Mod: 26,,, | Performed by: RADIOLOGY

## 2021-02-03 PROCEDURE — 77067 SCR MAMMO BI INCL CAD: CPT | Mod: TC

## 2021-02-25 ENCOUNTER — PATIENT MESSAGE (OUTPATIENT)
Dept: ORTHOPEDICS | Facility: CLINIC | Age: 61
End: 2021-02-25

## 2021-03-02 ENCOUNTER — TELEPHONE (OUTPATIENT)
Dept: INTERNAL MEDICINE | Facility: CLINIC | Age: 61
End: 2021-03-02

## 2021-03-02 RX ORDER — LORAZEPAM 0.5 MG/1
TABLET ORAL
Qty: 30 TABLET | Refills: 0 | Status: SHIPPED | OUTPATIENT
Start: 2021-03-02 | End: 2021-10-07 | Stop reason: SDUPTHER

## 2021-03-03 ENCOUNTER — IMMUNIZATION (OUTPATIENT)
Dept: PRIMARY CARE CLINIC | Facility: CLINIC | Age: 61
End: 2021-03-03
Payer: COMMERCIAL

## 2021-03-03 DIAGNOSIS — Z23 NEED FOR VACCINATION: Primary | ICD-10-CM

## 2021-03-03 PROCEDURE — 0001A PR IMMUNIZ ADMIN, SARS-COV-2 COVID-19 VACC, 30MCG/0.3ML, 1ST DOSE: CPT | Mod: CV19,S$GLB,, | Performed by: INTERNAL MEDICINE

## 2021-03-03 PROCEDURE — 91300 PR SARS-COV- 2 COVID-19 VACCINE, NO PRSV, 30MCG/0.3ML, IM: ICD-10-PCS | Mod: S$GLB,,, | Performed by: INTERNAL MEDICINE

## 2021-03-03 PROCEDURE — 91300 PR SARS-COV- 2 COVID-19 VACCINE, NO PRSV, 30MCG/0.3ML, IM: CPT | Mod: S$GLB,,, | Performed by: INTERNAL MEDICINE

## 2021-03-03 PROCEDURE — 0001A PR IMMUNIZ ADMIN, SARS-COV-2 COVID-19 VACC, 30MCG/0.3ML, 1ST DOSE: ICD-10-PCS | Mod: CV19,S$GLB,, | Performed by: INTERNAL MEDICINE

## 2021-03-03 RX ADMIN — Medication 0.3 ML: at 11:03

## 2021-03-26 ENCOUNTER — IMMUNIZATION (OUTPATIENT)
Dept: PRIMARY CARE CLINIC | Facility: CLINIC | Age: 61
End: 2021-03-26

## 2021-03-26 DIAGNOSIS — Z23 NEED FOR VACCINATION: Primary | ICD-10-CM

## 2021-03-26 PROCEDURE — 0002A PR IMMUNIZ ADMIN, SARS-COV-2 COVID-19 VACC, 30MCG/0.3ML, 2ND DOSE: ICD-10-PCS | Mod: CV19,S$GLB,, | Performed by: INTERNAL MEDICINE

## 2021-03-26 PROCEDURE — 91300 PR SARS-COV- 2 COVID-19 VACCINE, NO PRSV, 30MCG/0.3ML, IM: ICD-10-PCS | Mod: S$GLB,,, | Performed by: INTERNAL MEDICINE

## 2021-03-26 PROCEDURE — 0002A PR IMMUNIZ ADMIN, SARS-COV-2 COVID-19 VACC, 30MCG/0.3ML, 2ND DOSE: CPT | Mod: CV19,S$GLB,, | Performed by: INTERNAL MEDICINE

## 2021-03-26 PROCEDURE — 91300 PR SARS-COV- 2 COVID-19 VACCINE, NO PRSV, 30MCG/0.3ML, IM: CPT | Mod: S$GLB,,, | Performed by: INTERNAL MEDICINE

## 2021-03-26 RX ADMIN — Medication 0.3 ML: at 10:03

## 2021-06-28 ENCOUNTER — PATIENT MESSAGE (OUTPATIENT)
Dept: PHARMACY | Facility: CLINIC | Age: 61
End: 2021-06-28

## 2021-07-13 ENCOUNTER — TELEPHONE (OUTPATIENT)
Dept: INTERNAL MEDICINE | Facility: CLINIC | Age: 61
End: 2021-07-13

## 2021-10-07 ENCOUNTER — TELEPHONE (OUTPATIENT)
Dept: INTERNAL MEDICINE | Facility: CLINIC | Age: 61
End: 2021-10-07

## 2021-10-07 DIAGNOSIS — Z00.00 ANNUAL PHYSICAL EXAM: Primary | ICD-10-CM

## 2021-10-07 DIAGNOSIS — I10 ESSENTIAL HYPERTENSION: ICD-10-CM

## 2021-10-07 RX ORDER — ESCITALOPRAM OXALATE 10 MG/1
10 TABLET ORAL DAILY
Qty: 90 TABLET | Refills: 3 | Status: SHIPPED | OUTPATIENT
Start: 2021-10-07 | End: 2022-10-05

## 2021-10-07 RX ORDER — LORAZEPAM 0.5 MG/1
TABLET ORAL
Qty: 30 TABLET | Refills: 0 | Status: SHIPPED | OUTPATIENT
Start: 2021-10-07 | End: 2021-12-27

## 2021-10-07 RX ORDER — AMLODIPINE BESYLATE 5 MG/1
5 TABLET ORAL DAILY
Qty: 90 TABLET | Refills: 3 | Status: SHIPPED | OUTPATIENT
Start: 2021-10-07 | End: 2022-04-13 | Stop reason: SDUPTHER

## 2021-10-07 NOTE — LETTER
March 6, 2018      Deneen Rodrigues PA-C  [address]  [facility] [address]           [facility]  [address]  [facility] [address]  Phone: [phone]          Patient: Buffy Morin   MR Number: [id number]   YOB: 1960   Date of Visit: 3/6/2018       Dear Deneen Rodrigues:    Thank you for referring Buffy Morin to me for evaluation. Attached you will find relevant portions of my assessment and plan of care.    If you have questions, please do not hesitate to call me. I look forward to following Buffy Morin along with you.    Sincerely,    Marcia Connell MD    Enclosure  CC:  No Recipients    If you would like to receive this communication electronically, please contact [email or web address] or [phone] to request more information on [facility] access.    For providers and/or their staff who would like to refer a patient to [facility], please contact us through our one-stop-shop provider referral line, [facility], at [phone].    If you feel you have received this communication in error or would no longer like to receive these types of communications, please e-mail [email or web address]          Acid reflux

## 2021-10-12 ENCOUNTER — HOSPITAL ENCOUNTER (EMERGENCY)
Facility: HOSPITAL | Age: 61
Discharge: HOME | End: 2021-10-12
Attending: EMERGENCY MEDICINE
Payer: COMMERCIAL

## 2021-10-12 VITALS
TEMPERATURE: 98 F | HEART RATE: 65 BPM | HEIGHT: 64 IN | RESPIRATION RATE: 22 BRPM | BODY MASS INDEX: 29.88 KG/M2 | WEIGHT: 175 LBS | OXYGEN SATURATION: 99 % | DIASTOLIC BLOOD PRESSURE: 52 MMHG | SYSTOLIC BLOOD PRESSURE: 100 MMHG

## 2021-10-12 DIAGNOSIS — R53.1 GENERALIZED WEAKNESS: ICD-10-CM

## 2021-10-12 DIAGNOSIS — R06.02 SHORTNESS OF BREATH: ICD-10-CM

## 2021-10-12 LAB
ALBUMIN SERPL BCP-MCNC: 4 G/DL (ref 3.5–5.2)
ALP SERPL-CCNC: 107 U/L (ref 55–135)
ALT SERPL W/O P-5'-P-CCNC: 16 U/L (ref 10–44)
ANION GAP SERPL CALC-SCNC: 9 MMOL/L (ref 8–16)
AST SERPL-CCNC: 21 U/L (ref 10–40)
BASOPHILS # BLD AUTO: 0.03 K/UL (ref 0–0.2)
BASOPHILS NFR BLD: 0.6 % (ref 0–1.9)
BILIRUB SERPL-MCNC: 0.5 MG/DL (ref 0.1–1)
BNP SERPL-MCNC: 14 PG/ML (ref 0–99)
BUN SERPL-MCNC: 13 MG/DL (ref 8–23)
CALCIUM SERPL-MCNC: 9 MG/DL (ref 8.7–10.5)
CHLORIDE SERPL-SCNC: 108 MMOL/L (ref 95–110)
CO2 SERPL-SCNC: 23 MMOL/L (ref 23–29)
CREAT SERPL-MCNC: 0.8 MG/DL (ref 0.5–1.4)
DIFFERENTIAL METHOD: ABNORMAL
EOSINOPHIL # BLD AUTO: 0.1 K/UL (ref 0–0.5)
EOSINOPHIL NFR BLD: 1.9 % (ref 0–8)
ERYTHROCYTE [DISTWIDTH] IN BLOOD BY AUTOMATED COUNT: 14.5 % (ref 11.5–14.5)
EST. GFR  (AFRICAN AMERICAN): >60 ML/MIN/1.73 M^2
EST. GFR  (NON AFRICAN AMERICAN): >60 ML/MIN/1.73 M^2
GLUCOSE SERPL-MCNC: 128 MG/DL (ref 70–110)
HCT VFR BLD AUTO: 37.9 % (ref 37–48.5)
HGB BLD-MCNC: 12 G/DL (ref 12–16)
IMM GRANULOCYTES # BLD AUTO: 0.01 K/UL (ref 0–0.04)
IMM GRANULOCYTES NFR BLD AUTO: 0.2 % (ref 0–0.5)
LYMPHOCYTES # BLD AUTO: 2 K/UL (ref 1–4.8)
LYMPHOCYTES NFR BLD: 42.6 % (ref 18–48)
MCH RBC QN AUTO: 26.3 PG (ref 27–31)
MCHC RBC AUTO-ENTMCNC: 31.7 G/DL (ref 32–36)
MCV RBC AUTO: 83 FL (ref 82–98)
MONOCYTES # BLD AUTO: 0.3 K/UL (ref 0.3–1)
MONOCYTES NFR BLD: 6.1 % (ref 4–15)
NEUTROPHILS # BLD AUTO: 2.3 K/UL (ref 1.8–7.7)
NEUTROPHILS NFR BLD: 48.6 % (ref 38–73)
NRBC BLD-RTO: 0 /100 WBC
PLATELET # BLD AUTO: 280 K/UL (ref 150–450)
PMV BLD AUTO: 10.2 FL (ref 9.2–12.9)
POTASSIUM SERPL-SCNC: 3.9 MMOL/L (ref 3.5–5.1)
PROT SERPL-MCNC: 7.5 G/DL (ref 6–8.4)
RBC # BLD AUTO: 4.56 M/UL (ref 4–5.4)
SODIUM SERPL-SCNC: 140 MMOL/L (ref 136–145)
TROPONIN I SERPL DL<=0.01 NG/ML-MCNC: <0.006 NG/ML (ref 0–0.03)
TROPONIN I SERPL DL<=0.01 NG/ML-MCNC: <0.006 NG/ML (ref 0–0.03)
WBC # BLD AUTO: 4.72 K/UL (ref 3.9–12.7)

## 2021-10-12 PROCEDURE — 93010 EKG 12-LEAD: ICD-10-PCS | Mod: ,,, | Performed by: INTERNAL MEDICINE

## 2021-10-12 PROCEDURE — 83880 ASSAY OF NATRIURETIC PEPTIDE: CPT | Performed by: EMERGENCY MEDICINE

## 2021-10-12 PROCEDURE — 85025 COMPLETE CBC W/AUTO DIFF WBC: CPT | Performed by: EMERGENCY MEDICINE

## 2021-10-12 PROCEDURE — 93010 ELECTROCARDIOGRAM REPORT: CPT | Mod: ,,, | Performed by: INTERNAL MEDICINE

## 2021-10-12 PROCEDURE — 93005 ELECTROCARDIOGRAM TRACING: CPT

## 2021-10-12 PROCEDURE — 84484 ASSAY OF TROPONIN QUANT: CPT | Performed by: EMERGENCY MEDICINE

## 2021-10-12 PROCEDURE — 99285 EMERGENCY DEPT VISIT HI MDM: CPT | Mod: 25

## 2021-10-12 PROCEDURE — 80053 COMPREHEN METABOLIC PANEL: CPT | Performed by: EMERGENCY MEDICINE

## 2021-10-13 ENCOUNTER — TELEPHONE (OUTPATIENT)
Dept: INTERNAL MEDICINE | Facility: CLINIC | Age: 61
End: 2021-10-13

## 2021-10-27 ENCOUNTER — TELEPHONE (OUTPATIENT)
Dept: INTERNAL MEDICINE | Facility: CLINIC | Age: 61
End: 2021-10-27
Payer: COMMERCIAL

## 2021-11-01 ENCOUNTER — TELEPHONE (OUTPATIENT)
Dept: INTERNAL MEDICINE | Facility: CLINIC | Age: 61
End: 2021-11-01
Payer: COMMERCIAL

## 2021-12-27 RX ORDER — LORAZEPAM 0.5 MG/1
TABLET ORAL
Qty: 30 TABLET | Refills: 0 | Status: SHIPPED | OUTPATIENT
Start: 2021-12-27 | End: 2022-11-21

## 2021-12-30 ENCOUNTER — IMMUNIZATION (OUTPATIENT)
Dept: INTERNAL MEDICINE | Facility: CLINIC | Age: 61
End: 2021-12-30
Payer: COMMERCIAL

## 2021-12-30 DIAGNOSIS — Z23 NEED FOR VACCINATION: Primary | ICD-10-CM

## 2021-12-30 PROCEDURE — 0004A COVID-19, MRNA, LNP-S, PF, 30 MCG/0.3 ML DOSE VACCINE: CPT | Mod: PBBFAC | Performed by: INTERNAL MEDICINE

## 2022-01-08 ENCOUNTER — LAB VISIT (OUTPATIENT)
Dept: LAB | Facility: HOSPITAL | Age: 62
End: 2022-01-08
Attending: INTERNAL MEDICINE
Payer: COMMERCIAL

## 2022-01-08 DIAGNOSIS — Z00.00 ANNUAL PHYSICAL EXAM: ICD-10-CM

## 2022-01-08 LAB
25(OH)D3+25(OH)D2 SERPL-MCNC: 41 NG/ML (ref 30–96)
ALBUMIN SERPL BCP-MCNC: 4 G/DL (ref 3.5–5.2)
ALP SERPL-CCNC: 110 U/L (ref 55–135)
ALT SERPL W/O P-5'-P-CCNC: 18 U/L (ref 10–44)
ANION GAP SERPL CALC-SCNC: 7 MMOL/L (ref 8–16)
AST SERPL-CCNC: 20 U/L (ref 10–40)
BASOPHILS # BLD AUTO: 0.04 K/UL (ref 0–0.2)
BASOPHILS NFR BLD: 1 % (ref 0–1.9)
BILIRUB SERPL-MCNC: 0.5 MG/DL (ref 0.1–1)
BUN SERPL-MCNC: 10 MG/DL (ref 8–23)
CALCIUM SERPL-MCNC: 9.5 MG/DL (ref 8.7–10.5)
CHLORIDE SERPL-SCNC: 106 MMOL/L (ref 95–110)
CHOLEST SERPL-MCNC: 229 MG/DL (ref 120–199)
CHOLEST/HDLC SERPL: 3.9 {RATIO} (ref 2–5)
CO2 SERPL-SCNC: 26 MMOL/L (ref 23–29)
CREAT SERPL-MCNC: 0.9 MG/DL (ref 0.5–1.4)
DIFFERENTIAL METHOD: ABNORMAL
EOSINOPHIL # BLD AUTO: 0.1 K/UL (ref 0–0.5)
EOSINOPHIL NFR BLD: 2.4 % (ref 0–8)
ERYTHROCYTE [DISTWIDTH] IN BLOOD BY AUTOMATED COUNT: 14.6 % (ref 11.5–14.5)
EST. GFR  (AFRICAN AMERICAN): >60 ML/MIN/1.73 M^2
EST. GFR  (NON AFRICAN AMERICAN): >60 ML/MIN/1.73 M^2
ESTIMATED AVG GLUCOSE: 131 MG/DL (ref 68–131)
GLUCOSE SERPL-MCNC: 103 MG/DL (ref 70–110)
HBA1C MFR BLD: 6.2 % (ref 4–5.6)
HCT VFR BLD AUTO: 38.6 % (ref 37–48.5)
HDLC SERPL-MCNC: 58 MG/DL (ref 40–75)
HDLC SERPL: 25.3 % (ref 20–50)
HGB BLD-MCNC: 12.2 G/DL (ref 12–16)
IMM GRANULOCYTES # BLD AUTO: 0.01 K/UL (ref 0–0.04)
IMM GRANULOCYTES NFR BLD AUTO: 0.2 % (ref 0–0.5)
LDLC SERPL CALC-MCNC: 157.2 MG/DL (ref 63–159)
LYMPHOCYTES # BLD AUTO: 1.9 K/UL (ref 1–4.8)
LYMPHOCYTES NFR BLD: 44.9 % (ref 18–48)
MCH RBC QN AUTO: 26.4 PG (ref 27–31)
MCHC RBC AUTO-ENTMCNC: 31.6 G/DL (ref 32–36)
MCV RBC AUTO: 84 FL (ref 82–98)
MONOCYTES # BLD AUTO: 0.3 K/UL (ref 0.3–1)
MONOCYTES NFR BLD: 6.4 % (ref 4–15)
NEUTROPHILS # BLD AUTO: 1.9 K/UL (ref 1.8–7.7)
NEUTROPHILS NFR BLD: 45.1 % (ref 38–73)
NONHDLC SERPL-MCNC: 171 MG/DL
NRBC BLD-RTO: 0 /100 WBC
PLATELET # BLD AUTO: 298 K/UL (ref 150–450)
PMV BLD AUTO: 10 FL (ref 9.2–12.9)
POTASSIUM SERPL-SCNC: 4.5 MMOL/L (ref 3.5–5.1)
PROT SERPL-MCNC: 7.7 G/DL (ref 6–8.4)
RBC # BLD AUTO: 4.62 M/UL (ref 4–5.4)
SODIUM SERPL-SCNC: 139 MMOL/L (ref 136–145)
TRIGL SERPL-MCNC: 69 MG/DL (ref 30–150)
TSH SERPL DL<=0.005 MIU/L-ACNC: 1.03 UIU/ML (ref 0.4–4)
WBC # BLD AUTO: 4.19 K/UL (ref 3.9–12.7)

## 2022-01-08 PROCEDURE — 85025 COMPLETE CBC W/AUTO DIFF WBC: CPT | Performed by: INTERNAL MEDICINE

## 2022-01-08 PROCEDURE — 36415 COLL VENOUS BLD VENIPUNCTURE: CPT | Performed by: INTERNAL MEDICINE

## 2022-01-08 PROCEDURE — 80061 LIPID PANEL: CPT | Performed by: INTERNAL MEDICINE

## 2022-01-08 PROCEDURE — 82306 VITAMIN D 25 HYDROXY: CPT | Performed by: INTERNAL MEDICINE

## 2022-01-08 PROCEDURE — 84443 ASSAY THYROID STIM HORMONE: CPT | Performed by: INTERNAL MEDICINE

## 2022-01-08 PROCEDURE — 83036 HEMOGLOBIN GLYCOSYLATED A1C: CPT | Performed by: INTERNAL MEDICINE

## 2022-01-08 PROCEDURE — 80053 COMPREHEN METABOLIC PANEL: CPT | Performed by: INTERNAL MEDICINE

## 2022-01-10 NOTE — PROGRESS NOTES
Labs acceptable.  I will review with you in more detail at your upcoming appointment.  Please be sure to keep your appointment with me.   Please let me know if you have any questions or concerns.  I look forward to hearing from you.

## 2022-01-13 ENCOUNTER — TELEPHONE (OUTPATIENT)
Dept: INTERNAL MEDICINE | Facility: CLINIC | Age: 62
End: 2022-01-13
Payer: COMMERCIAL

## 2022-01-13 NOTE — TELEPHONE ENCOUNTER
lft msg to call back but stated dr. Carrasquillo will not be in the office in the afternoon and wanted to know if pt can come tomorrow between 8-11am.

## 2022-01-14 ENCOUNTER — TELEPHONE (OUTPATIENT)
Dept: INTERNAL MEDICINE | Facility: CLINIC | Age: 62
End: 2022-01-14
Payer: COMMERCIAL

## 2022-01-14 NOTE — TELEPHONE ENCOUNTER
Called pt 4 times today and 2 yesterday about her appt today with dr. Carrasquillo. lft a detailed msg, sent pt a portal msg and sent a team msg . Pt didn't reply back.

## 2022-01-14 NOTE — TELEPHONE ENCOUNTER
----- Message from Nelly Chaparro sent at 1/14/2022 12:10 PM CST -----  Contact: ANOOP [phone]--  Pt calling to speak with the nurse to let her know that she does not want to do a video visit she would like for her appointment to be reschedule with her doctor today to come in to see her? Please call to advise because she gets off at 2 pm.

## 2022-01-20 ENCOUNTER — TELEPHONE (OUTPATIENT)
Dept: INTERNAL MEDICINE | Facility: CLINIC | Age: 62
End: 2022-01-20
Payer: COMMERCIAL

## 2022-02-21 ENCOUNTER — OFFICE VISIT (OUTPATIENT)
Dept: INTERNAL MEDICINE | Facility: CLINIC | Age: 62
End: 2022-02-21
Payer: COMMERCIAL

## 2022-02-21 VITALS
WEIGHT: 176 LBS | SYSTOLIC BLOOD PRESSURE: 120 MMHG | BODY MASS INDEX: 30.05 KG/M2 | HEIGHT: 64 IN | DIASTOLIC BLOOD PRESSURE: 80 MMHG

## 2022-02-21 DIAGNOSIS — M54.16 LUMBAR RADICULOPATHY: ICD-10-CM

## 2022-02-21 DIAGNOSIS — M43.16 SPONDYLOLISTHESIS OF LUMBAR REGION: ICD-10-CM

## 2022-02-21 DIAGNOSIS — E78.2 MIXED HYPERLIPIDEMIA: ICD-10-CM

## 2022-02-21 DIAGNOSIS — I10 ESSENTIAL HYPERTENSION: ICD-10-CM

## 2022-02-21 DIAGNOSIS — Z12.31 SCREENING MAMMOGRAM, ENCOUNTER FOR: ICD-10-CM

## 2022-02-21 DIAGNOSIS — E66.09 CLASS 1 OBESITY DUE TO EXCESS CALORIES WITHOUT SERIOUS COMORBIDITY WITH BODY MASS INDEX (BMI) OF 30.0 TO 30.9 IN ADULT: ICD-10-CM

## 2022-02-21 DIAGNOSIS — R73.01 IFG (IMPAIRED FASTING GLUCOSE): ICD-10-CM

## 2022-02-21 DIAGNOSIS — Z00.00 ANNUAL PHYSICAL EXAM: Primary | ICD-10-CM

## 2022-02-21 PROBLEM — Z91.89 AT HIGH RISK FOR BREAST CANCER: Status: RESOLVED | Noted: 2019-02-06 | Resolved: 2022-02-21

## 2022-02-21 PROBLEM — E66.811 CLASS 1 OBESITY DUE TO EXCESS CALORIES WITHOUT SERIOUS COMORBIDITY WITH BODY MASS INDEX (BMI) OF 30.0 TO 30.9 IN ADULT: Status: ACTIVE | Noted: 2022-02-21

## 2022-02-21 PROCEDURE — 1160F PR REVIEW ALL MEDS BY PRESCRIBER/CLIN PHARMACIST DOCUMENTED: ICD-10-PCS | Mod: CPTII,S$GLB,, | Performed by: INTERNAL MEDICINE

## 2022-02-21 PROCEDURE — 3074F PR MOST RECENT SYSTOLIC BLOOD PRESSURE < 130 MM HG: ICD-10-PCS | Mod: CPTII,S$GLB,, | Performed by: INTERNAL MEDICINE

## 2022-02-21 PROCEDURE — 3008F PR BODY MASS INDEX (BMI) DOCUMENTED: ICD-10-PCS | Mod: CPTII,S$GLB,, | Performed by: INTERNAL MEDICINE

## 2022-02-21 PROCEDURE — 1159F MED LIST DOCD IN RCRD: CPT | Mod: CPTII,S$GLB,, | Performed by: INTERNAL MEDICINE

## 2022-02-21 PROCEDURE — 1160F RVW MEDS BY RX/DR IN RCRD: CPT | Mod: CPTII,S$GLB,, | Performed by: INTERNAL MEDICINE

## 2022-02-21 PROCEDURE — 3079F PR MOST RECENT DIASTOLIC BLOOD PRESSURE 80-89 MM HG: ICD-10-PCS | Mod: CPTII,S$GLB,, | Performed by: INTERNAL MEDICINE

## 2022-02-21 PROCEDURE — 3079F DIAST BP 80-89 MM HG: CPT | Mod: CPTII,S$GLB,, | Performed by: INTERNAL MEDICINE

## 2022-02-21 PROCEDURE — 3008F BODY MASS INDEX DOCD: CPT | Mod: CPTII,S$GLB,, | Performed by: INTERNAL MEDICINE

## 2022-02-21 PROCEDURE — 99999 PR PBB SHADOW E&M-EST. PATIENT-LVL IV: CPT | Mod: PBBFAC,,, | Performed by: INTERNAL MEDICINE

## 2022-02-21 PROCEDURE — 3044F HG A1C LEVEL LT 7.0%: CPT | Mod: CPTII,S$GLB,, | Performed by: INTERNAL MEDICINE

## 2022-02-21 PROCEDURE — 99999 PR PBB SHADOW E&M-EST. PATIENT-LVL IV: ICD-10-PCS | Mod: PBBFAC,,, | Performed by: INTERNAL MEDICINE

## 2022-02-21 PROCEDURE — 99396 PREV VISIT EST AGE 40-64: CPT | Mod: S$GLB,,, | Performed by: INTERNAL MEDICINE

## 2022-02-21 PROCEDURE — 1159F PR MEDICATION LIST DOCUMENTED IN MEDICAL RECORD: ICD-10-PCS | Mod: CPTII,S$GLB,, | Performed by: INTERNAL MEDICINE

## 2022-02-21 PROCEDURE — 99396 PR PREVENTIVE VISIT,EST,40-64: ICD-10-PCS | Mod: S$GLB,,, | Performed by: INTERNAL MEDICINE

## 2022-02-21 PROCEDURE — 3044F PR MOST RECENT HEMOGLOBIN A1C LEVEL <7.0%: ICD-10-PCS | Mod: CPTII,S$GLB,, | Performed by: INTERNAL MEDICINE

## 2022-02-21 PROCEDURE — 3074F SYST BP LT 130 MM HG: CPT | Mod: CPTII,S$GLB,, | Performed by: INTERNAL MEDICINE

## 2022-02-21 RX ORDER — ATORVASTATIN CALCIUM 40 MG/1
40 TABLET, FILM COATED ORAL DAILY
Qty: 90 TABLET | Refills: 1 | Status: SHIPPED | OUTPATIENT
Start: 2022-02-21 | End: 2022-07-12

## 2022-02-21 RX ORDER — DICLOFENAC SODIUM 10 MG/G
GEL TOPICAL
Qty: 1 EACH | Refills: 3
Start: 2022-02-21

## 2022-02-21 NOTE — PROGRESS NOTES
Subjective:       Patient ID: Buffy Morin is a 61 y.o. female.    Chief Complaint: Annual Exam    Annual exam    BP stable.    A1c 6.2; slight weight gain because she has not been able to exercise much.  Denies apnea symptoms.  Hoping to work on this.  Denies polydipsia, polyuria or any unexplained weight loss.    Lipids up, reviewed.  She is supposed to be on atorvastatin- states she is.  She is not exercising due to DJD back and sciatica.    Situational stress doing well.    The 10-year ASCVD risk score (Olmsteadtammi SHEEHAN Jr., et al., 2013) is: 7.7%    Values used to calculate the score:      Age: 61 years      Sex: Female      Is Non- : Yes      Diabetic: No      Tobacco smoker: No      Systolic Blood Pressure: 120 mmHg      Is BP treated: Yes      HDL Cholesterol: 58 mg/dL      Total Cholesterol: 229 mg/dL    Patient Active Problem List:     Anxiety     Carpal tunnel syndrome     Mild vitamin D deficiency     Mixed hyperlipidemia     Family history of breast cancer in sister     Essential hypertension     S/P hysterectomy: complete     Chronic pain     Lumbar radiculopathy     IFG (impaired fasting glucose)     Class 1 obesity due to excess calories without serious comorbidity with body mass index (BMI) of 30.0 to 30.9 in adult      Review of Systems   Constitutional: Negative for activity change, appetite change, chills, fatigue and fever.   HENT: Negative for congestion, hearing loss, sinus pressure and sore throat.    Eyes: Negative for visual disturbance.   Respiratory: Negative for apnea, cough, shortness of breath and wheezing.    Cardiovascular: Negative for chest pain, palpitations and leg swelling.   Gastrointestinal: Negative for abdominal distention, abdominal pain, constipation, diarrhea, nausea and vomiting.   Genitourinary: Negative for difficulty urinating, dysuria, frequency, hematuria and vaginal bleeding.   Musculoskeletal: Positive for back pain. Negative for gait problem,  joint swelling and myalgias.   Skin: Negative for rash.   Neurological: Negative for dizziness, weakness, light-headedness, numbness and headaches.   Hematological: Negative for adenopathy. Does not bruise/bleed easily.   Psychiatric/Behavioral: Negative for confusion, hallucinations, sleep disturbance and suicidal ideas.       Objective:      Physical Exam  Vitals and nursing note reviewed.   Constitutional:       Appearance: She is well-developed.   HENT:      Head: Normocephalic and atraumatic.      Right Ear: External ear normal.      Left Ear: External ear normal.      Nose: Nose normal.      Mouth/Throat:      Pharynx: No oropharyngeal exudate.   Eyes:      General: No scleral icterus.     Extraocular Movements: Extraocular movements intact.      Conjunctiva/sclera: Conjunctivae normal.   Neck:      Thyroid: No thyromegaly.      Vascular: No JVD.   Cardiovascular:      Rate and Rhythm: Normal rate and regular rhythm.      Heart sounds: Normal heart sounds. No murmur heard.    No gallop.   Pulmonary:      Effort: Pulmonary effort is normal. No respiratory distress.      Breath sounds: Normal breath sounds. No wheezing.   Abdominal:      General: Bowel sounds are normal. There is no distension.      Palpations: Abdomen is soft. There is no mass.      Tenderness: There is no abdominal tenderness. There is no guarding or rebound.   Musculoskeletal:         General: No tenderness. Normal range of motion.      Cervical back: Normal range of motion and neck supple.   Lymphadenopathy:      Cervical: No cervical adenopathy.   Skin:     General: Skin is warm.      Findings: No erythema or rash.   Neurological:      General: No focal deficit present.      Mental Status: She is alert and oriented to person, place, and time.      Cranial Nerves: No cranial nerve deficit.      Coordination: Coordination normal.   Psychiatric:         Behavior: Behavior normal.         Thought Content: Thought content normal.          Judgment: Judgment normal.         Assessment:       1. Annual physical exam    2. Mixed hyperlipidemia    3. Essential hypertension    4. Screening mammogram, encounter for    5. IFG (impaired fasting glucose)    6. Class 1 obesity due to excess calories without serious comorbidity with body mass index (BMI) of 30.0 to 30.9 in adult    7. Spondylolisthesis of lumbar region    8. Lumbar radiculopathy        Plan:           Buffy was seen today for annual exam.    Diagnoses and all orders for this visit:    Annual physical exam    Mixed hyperlipidemia; increase atorvastatin to 40  -     Lipid Panel; Future  -     AST (SGOT); Future    Essential hypertension; Low salt diet, exercise, 15-20-# weight loss in next 6-12 months' time.  Call if BP > 130/80 on a regular basis.    Screening mammogram, encounter for  -     Mammo Digital Screening Bilat w/ Alec; Standing    IFG (impaired fasting glucose)    Class 1 obesity due to excess calories without serious comorbidity with body mass index (BMI) of 30.0 to 30.9 in adult    Spondylolisthesis of lumbar region  -     diclofenac sodium (VOLTAREN) 1 % Gel; Apply two grams to affected area 3-4 times as needed    Lumbar radiculopathy  -     diclofenac sodium (VOLTAREN) 1 % Gel; Apply two grams to affected area 3-4 times as needed    Other orders  -     atorvastatin (LIPITOR) 40 MG tablet; Take 1 tablet (40 mg total) by mouth once daily.    Shingles vaccine # 1 today  Increase atorvastatin to 40 and repeat labs in 3 months  Schedule mammogram  Exercise as tolerated, low-impact; goal of 15-20 pounds of weight loss in the next year  I will review all studies and determine further tx depending on findings

## 2022-02-22 ENCOUNTER — TELEPHONE (OUTPATIENT)
Dept: INTERNAL MEDICINE | Facility: CLINIC | Age: 62
End: 2022-02-22
Payer: COMMERCIAL

## 2022-02-22 NOTE — TELEPHONE ENCOUNTER
----- Message from Bernarda Contreras sent at 2/21/2022  4:51 PM CST -----  Regarding: Scheduling  Patient has stated she will call back when she's able to find transportation.

## 2022-04-08 ENCOUNTER — TELEPHONE (OUTPATIENT)
Dept: INTERNAL MEDICINE | Facility: CLINIC | Age: 62
End: 2022-04-08
Payer: COMMERCIAL

## 2022-04-08 ENCOUNTER — OFFICE VISIT (OUTPATIENT)
Dept: URGENT CARE | Facility: CLINIC | Age: 62
End: 2022-04-08
Payer: COMMERCIAL

## 2022-04-08 VITALS
SYSTOLIC BLOOD PRESSURE: 147 MMHG | RESPIRATION RATE: 18 BRPM | DIASTOLIC BLOOD PRESSURE: 87 MMHG | HEART RATE: 78 BPM | BODY MASS INDEX: 30.05 KG/M2 | WEIGHT: 176 LBS | TEMPERATURE: 97 F | OXYGEN SATURATION: 97 % | HEIGHT: 64 IN

## 2022-04-08 DIAGNOSIS — R42 LIGHT HEADEDNESS: Primary | ICD-10-CM

## 2022-04-08 LAB
BILIRUB UR QL STRIP: NEGATIVE
GLUCOSE SERPL-MCNC: 93 MG/DL (ref 70–110)
GLUCOSE UR QL STRIP: NEGATIVE
KETONES UR QL STRIP: NEGATIVE
LEUKOCYTE ESTERASE UR QL STRIP: NEGATIVE
PH, POC UA: 9
POC BLOOD, URINE: NEGATIVE
POC NITRATES, URINE: NEGATIVE
PROT UR QL STRIP: NEGATIVE
SP GR UR STRIP: 1 (ref 1–1.03)
UROBILINOGEN UR STRIP-ACNC: NORMAL (ref 0.1–1.1)

## 2022-04-08 PROCEDURE — 81003 URINALYSIS AUTO W/O SCOPE: CPT | Mod: QW,S$GLB,, | Performed by: NURSE PRACTITIONER

## 2022-04-08 PROCEDURE — 3044F HG A1C LEVEL LT 7.0%: CPT | Mod: CPTII,S$GLB,, | Performed by: NURSE PRACTITIONER

## 2022-04-08 PROCEDURE — 1159F PR MEDICATION LIST DOCUMENTED IN MEDICAL RECORD: ICD-10-PCS | Mod: CPTII,S$GLB,, | Performed by: NURSE PRACTITIONER

## 2022-04-08 PROCEDURE — 1160F PR REVIEW ALL MEDS BY PRESCRIBER/CLIN PHARMACIST DOCUMENTED: ICD-10-PCS | Mod: CPTII,S$GLB,, | Performed by: NURSE PRACTITIONER

## 2022-04-08 PROCEDURE — 3077F PR MOST RECENT SYSTOLIC BLOOD PRESSURE >= 140 MM HG: ICD-10-PCS | Mod: CPTII,S$GLB,, | Performed by: NURSE PRACTITIONER

## 2022-04-08 PROCEDURE — 93005 EKG 12-LEAD: ICD-10-PCS | Mod: S$GLB,,, | Performed by: NURSE PRACTITIONER

## 2022-04-08 PROCEDURE — 93005 ELECTROCARDIOGRAM TRACING: CPT | Mod: S$GLB,,, | Performed by: NURSE PRACTITIONER

## 2022-04-08 PROCEDURE — 99203 PR OFFICE/OUTPT VISIT, NEW, LEVL III, 30-44 MIN: ICD-10-PCS | Mod: S$GLB,,, | Performed by: NURSE PRACTITIONER

## 2022-04-08 PROCEDURE — 3079F PR MOST RECENT DIASTOLIC BLOOD PRESSURE 80-89 MM HG: ICD-10-PCS | Mod: CPTII,S$GLB,, | Performed by: NURSE PRACTITIONER

## 2022-04-08 PROCEDURE — 81003 POCT URINALYSIS, DIPSTICK, AUTOMATED, W/O SCOPE: ICD-10-PCS | Mod: QW,S$GLB,, | Performed by: NURSE PRACTITIONER

## 2022-04-08 PROCEDURE — 3079F DIAST BP 80-89 MM HG: CPT | Mod: CPTII,S$GLB,, | Performed by: NURSE PRACTITIONER

## 2022-04-08 PROCEDURE — 1160F RVW MEDS BY RX/DR IN RCRD: CPT | Mod: CPTII,S$GLB,, | Performed by: NURSE PRACTITIONER

## 2022-04-08 PROCEDURE — 93010 EKG 12-LEAD: ICD-10-PCS | Mod: S$GLB,,, | Performed by: INTERNAL MEDICINE

## 2022-04-08 PROCEDURE — 82962 POCT GLUCOSE, HAND-HELD DEVICE: ICD-10-PCS | Mod: S$GLB,,, | Performed by: NURSE PRACTITIONER

## 2022-04-08 PROCEDURE — 82962 GLUCOSE BLOOD TEST: CPT | Mod: S$GLB,,, | Performed by: NURSE PRACTITIONER

## 2022-04-08 PROCEDURE — 3008F BODY MASS INDEX DOCD: CPT | Mod: CPTII,S$GLB,, | Performed by: NURSE PRACTITIONER

## 2022-04-08 PROCEDURE — 99203 OFFICE O/P NEW LOW 30 MIN: CPT | Mod: S$GLB,,, | Performed by: NURSE PRACTITIONER

## 2022-04-08 PROCEDURE — 1159F MED LIST DOCD IN RCRD: CPT | Mod: CPTII,S$GLB,, | Performed by: NURSE PRACTITIONER

## 2022-04-08 PROCEDURE — 3044F PR MOST RECENT HEMOGLOBIN A1C LEVEL <7.0%: ICD-10-PCS | Mod: CPTII,S$GLB,, | Performed by: NURSE PRACTITIONER

## 2022-04-08 PROCEDURE — 3077F SYST BP >= 140 MM HG: CPT | Mod: CPTII,S$GLB,, | Performed by: NURSE PRACTITIONER

## 2022-04-08 PROCEDURE — 93010 ELECTROCARDIOGRAM REPORT: CPT | Mod: S$GLB,,, | Performed by: INTERNAL MEDICINE

## 2022-04-08 PROCEDURE — 3008F PR BODY MASS INDEX (BMI) DOCUMENTED: ICD-10-PCS | Mod: CPTII,S$GLB,, | Performed by: NURSE PRACTITIONER

## 2022-04-08 NOTE — TELEPHONE ENCOUNTER
----- Message from Fior Lang sent at 4/8/2022  8:48 AM CDT -----  Contact: [phone]  Patient would like to get medical advice.  Symptoms (please be specific):  LIGHT HEADED   How long have you had these symptoms: X 1WEEK  Would you like a call back, or a response through your [facility] portal?:   CALL  Pharmacy name and phone # (copy from chart):    [id number] - [place] - [address] [facility]  [address] [address]  Phone: [phone] Fax: [phone]      Comments:

## 2022-04-08 NOTE — TELEPHONE ENCOUNTER
Spoke to patient She has been having lightheadedness for about a week.    Patient has hx of hypertension, and stated she does take her medications as directed.  Patient has not been taking blood pressures regularly.      Asked if she can take her bp now bp 165/89    Offered patient same day appt. Patient declined and requested message be sent to pcp.         Please advise

## 2022-04-08 NOTE — PROGRESS NOTES
"Subjective:       Patient ID: Buffy Morin is a 61 y.o. female.    Vitals:  height is 5' 4" (1.626 m) and weight is 79.8 kg (176 lb). Her temperature is 97.1 °F (36.2 °C). Her blood pressure is 147/87 (abnormal) and her pulse is 78. Her respiration is 18 and oxygen saturation is 97%.     Chief Complaint: Dizziness    Pt is a 62 yo female w/ c/o feeling lightheaded for about one week. She says that her PCP changed her cholesterol medication recently and she thinks it could be related to that. She denies changes in vision, room spinning sensation, chest pain, SOB, sinus pressure/congestion, N/V/D. She states she has an appointment next week with her primary care doctor.      Dizziness:   Chronicity:  New  Onset:  1 to 4 weeks ago  Progression since onset:  Unchanged  Frequency:  Every few days  Pain Scale:  0/10  Dizziness characteristics:  Spacial disorientation and off-balance  Frequency of Spells:  Daily   Associated symptoms: light-headedness.  Aggravated by:  Nothing  Treatments tried:  Nothing      Neurological: Positive for dizziness and light-headedness.       Objective:      Physical Exam   Constitutional: She is oriented to person, place, and time. She appears well-developed. She is cooperative.  Non-toxic appearance. She does not appear ill. No distress.   HENT:   Head: Normocephalic and atraumatic.   Ears:   Right Ear: Hearing, external ear and ear canal normal. No middle ear effusion.   Left Ear: Hearing, tympanic membrane, external ear and ear canal normal.  No middle ear effusion.   Nose: Nose normal. No mucosal edema, rhinorrhea or nasal deformity. No epistaxis. Right sinus exhibits no maxillary sinus tenderness and no frontal sinus tenderness. Left sinus exhibits no maxillary sinus tenderness and no frontal sinus tenderness.   Mouth/Throat: Uvula is midline, oropharynx is clear and moist and mucous membranes are normal. No trismus in the jaw. Normal dentition. No uvula swelling. No posterior " oropharyngeal erythema.   Eyes: Conjunctivae and lids are normal. Right eye exhibits no discharge. Left eye exhibits no discharge. No scleral icterus.   Neck: Trachea normal and phonation normal. Neck supple.   Cardiovascular: Normal rate, regular rhythm, normal heart sounds and normal pulses.   Pulmonary/Chest: Effort normal and breath sounds normal. No respiratory distress.   Abdominal: Normal appearance.   Musculoskeletal: Normal range of motion.         General: No deformity. Normal range of motion.   Neurological: She is alert and oriented to person, place, and time. She exhibits normal muscle tone. Coordination normal.   Skin: Skin is warm, dry, intact, not diaphoretic and not pale.   Psychiatric: Her speech is normal and behavior is normal. Judgment and thought content normal.   Nursing note and vitals reviewed.     NSR on EKG    Results for orders placed or performed in visit on 04/08/22   POCT Glucose, Hand-Held Device   Result Value Ref Range    POC Glucose 93 70 - 110 MG/DL   POCT Urinalysis, Dipstick, Automated, W/O Scope   Result Value Ref Range    POC Blood, Urine Negative Negative    POC Bilirubin, Urine Negative Negative    POC Urobilinogen, Urine norm 0.1 - 1.1    POC Ketones, Urine Negative Negative    POC Protein, Urine Negative Negative    POC Nitrates, Urine Negative Negative    POC Glucose, Urine Negative Negative    pH, UA 9.0     POC Specific Gravity, Urine 1.005 1.003 - 1.029    POC Leukocytes, Urine Negative Negative           Assessment:       1. Light headedness          Plan:         Light headedness  -     IN OFFICE EKG 12-LEAD (to Muse)  -     POCT Glucose, Hand-Held Device  -     Cancel: POCT URINALYSIS  -     POCT Urinalysis, Dipstick, Automated, W/O Scope      Patient Instructions   - You must understand that you have received an Urgent Care treatment only and that you may be released before all of your medical problems are known or treated.   - You, the patient, will arrange for  follow up care as instructed.   - If your condition worsens or fails to improve we recommend that you receive another evaluation at the ER immediately or contact your PCP to discuss your concerns.   - You can call [phone] or [phone] to help schedule an appointment with the appropriate provider.    Keep follow up appointment with your primary care provider on Wednesday

## 2022-04-08 NOTE — TELEPHONE ENCOUNTER
I recommend she come in for an office visitwith [place] today, or to urgent care today to make sure that she is medically stable in terms of the lightheadedness    In the meantime, I would recommend she take an extra dose of amlodipine so that she is taking 5 mg twice a day (or 10 mg once a day)

## 2022-04-08 NOTE — PATIENT INSTRUCTIONS
- You must understand that you have received an Urgent Care treatment only and that you may be released before all of your medical problems are known or treated.   - You, the patient, will arrange for follow up care as instructed.   - If your condition worsens or fails to improve we recommend that you receive another evaluation at the ER immediately or contact your PCP to discuss your concerns.   - You can call [phone] or [phone] to help schedule an appointment with the appropriate provider.    Keep follow up appointment with your primary care provider on Wednesday

## 2022-04-08 NOTE — TELEPHONE ENCOUNTER
Spoke to patient advised that she should be seen in office today or urgent care to make sure that she's medically stable in terms of her lightheadedness.     Also advised in the mean time pcp recommends she takes an extra dose of amlodipine 5mg twice a day or 10mg once daily.     Patient verbalized understanding.     Patient wanted to be seen by pcp appt scheduled     Also provided information for urgent care

## 2022-04-13 ENCOUNTER — OFFICE VISIT (OUTPATIENT)
Dept: INTERNAL MEDICINE | Facility: CLINIC | Age: 62
End: 2022-04-13
Payer: COMMERCIAL

## 2022-04-13 VITALS
DIASTOLIC BLOOD PRESSURE: 72 MMHG | BODY MASS INDEX: 29.37 KG/M2 | HEIGHT: 64 IN | WEIGHT: 172 LBS | SYSTOLIC BLOOD PRESSURE: 128 MMHG

## 2022-04-13 DIAGNOSIS — E78.2 MIXED HYPERLIPIDEMIA: ICD-10-CM

## 2022-04-13 DIAGNOSIS — E66.3 OVERWEIGHT (BMI 25.0-29.9): ICD-10-CM

## 2022-04-13 DIAGNOSIS — I10 ESSENTIAL HYPERTENSION: Primary | ICD-10-CM

## 2022-04-13 DIAGNOSIS — Z80.3 FAMILY HISTORY OF BREAST CANCER IN SISTER: ICD-10-CM

## 2022-04-13 PROBLEM — E66.811 CLASS 1 OBESITY DUE TO EXCESS CALORIES WITHOUT SERIOUS COMORBIDITY WITH BODY MASS INDEX (BMI) OF 30.0 TO 30.9 IN ADULT: Status: RESOLVED | Noted: 2022-02-21 | Resolved: 2022-04-13

## 2022-04-13 PROBLEM — E66.09 CLASS 1 OBESITY DUE TO EXCESS CALORIES WITHOUT SERIOUS COMORBIDITY WITH BODY MASS INDEX (BMI) OF 30.0 TO 30.9 IN ADULT: Status: RESOLVED | Noted: 2022-02-21 | Resolved: 2022-04-13

## 2022-04-13 PROCEDURE — 3078F DIAST BP <80 MM HG: CPT | Mod: CPTII,S$GLB,, | Performed by: INTERNAL MEDICINE

## 2022-04-13 PROCEDURE — 1159F MED LIST DOCD IN RCRD: CPT | Mod: CPTII,S$GLB,, | Performed by: INTERNAL MEDICINE

## 2022-04-13 PROCEDURE — 1160F RVW MEDS BY RX/DR IN RCRD: CPT | Mod: CPTII,S$GLB,, | Performed by: INTERNAL MEDICINE

## 2022-04-13 PROCEDURE — 3044F PR MOST RECENT HEMOGLOBIN A1C LEVEL <7.0%: ICD-10-PCS | Mod: CPTII,S$GLB,, | Performed by: INTERNAL MEDICINE

## 2022-04-13 PROCEDURE — 99214 OFFICE O/P EST MOD 30 MIN: CPT | Mod: S$GLB,,, | Performed by: INTERNAL MEDICINE

## 2022-04-13 PROCEDURE — 1160F PR REVIEW ALL MEDS BY PRESCRIBER/CLIN PHARMACIST DOCUMENTED: ICD-10-PCS | Mod: CPTII,S$GLB,, | Performed by: INTERNAL MEDICINE

## 2022-04-13 PROCEDURE — 99999 PR PBB SHADOW E&M-EST. PATIENT-LVL IV: ICD-10-PCS | Mod: PBBFAC,,, | Performed by: INTERNAL MEDICINE

## 2022-04-13 PROCEDURE — 3044F HG A1C LEVEL LT 7.0%: CPT | Mod: CPTII,S$GLB,, | Performed by: INTERNAL MEDICINE

## 2022-04-13 PROCEDURE — 3008F BODY MASS INDEX DOCD: CPT | Mod: CPTII,S$GLB,, | Performed by: INTERNAL MEDICINE

## 2022-04-13 PROCEDURE — 3008F PR BODY MASS INDEX (BMI) DOCUMENTED: ICD-10-PCS | Mod: CPTII,S$GLB,, | Performed by: INTERNAL MEDICINE

## 2022-04-13 PROCEDURE — 3074F PR MOST RECENT SYSTOLIC BLOOD PRESSURE < 130 MM HG: ICD-10-PCS | Mod: CPTII,S$GLB,, | Performed by: INTERNAL MEDICINE

## 2022-04-13 PROCEDURE — 3074F SYST BP LT 130 MM HG: CPT | Mod: CPTII,S$GLB,, | Performed by: INTERNAL MEDICINE

## 2022-04-13 PROCEDURE — 99999 PR PBB SHADOW E&M-EST. PATIENT-LVL IV: CPT | Mod: PBBFAC,,, | Performed by: INTERNAL MEDICINE

## 2022-04-13 PROCEDURE — 1159F PR MEDICATION LIST DOCUMENTED IN MEDICAL RECORD: ICD-10-PCS | Mod: CPTII,S$GLB,, | Performed by: INTERNAL MEDICINE

## 2022-04-13 PROCEDURE — 3078F PR MOST RECENT DIASTOLIC BLOOD PRESSURE < 80 MM HG: ICD-10-PCS | Mod: CPTII,S$GLB,, | Performed by: INTERNAL MEDICINE

## 2022-04-13 PROCEDURE — 99214 PR OFFICE/OUTPT VISIT, EST, LEVL IV, 30-39 MIN: ICD-10-PCS | Mod: S$GLB,,, | Performed by: INTERNAL MEDICINE

## 2022-04-13 RX ORDER — AMLODIPINE BESYLATE 10 MG/1
10 TABLET ORAL DAILY
Qty: 90 TABLET | Refills: 3 | Status: SHIPPED | OUTPATIENT
Start: 2022-04-13 | End: 2023-03-26

## 2022-04-13 NOTE — PROGRESS NOTES
Subjective:       Patient ID: Buffy Morin is a 61 y.o. female.    Chief Complaint: Hypertension    Hypertension follow-up    BP slightly elevated.  Discussed.  Digital Medicine program also reviewed.  She has been taking readings at home and typically 130/80 or lower.  No headaches or blurry vision. She increased amlodipine to 5 mg 2 x daily and home readings have been quite good on this regimen.    Recall Urgent Care appointment recently, EKG and labs ok other than BP.    Has lost a few pounds, BMI of 29.    Lipids, on treatment currently- she will be due for labs in June.    Ongoing back pain and sciatica and this interferes with sleep a bit.  She is working on this at present and symptoms seem to be slowly improving.    Due for mammogram currently.  She will schedule.    Patient Active Problem List:     Anxiety     Carpal tunnel syndrome     Mild vitamin D deficiency     Mixed hyperlipidemia     Family history of breast cancer in sister     Essential hypertension     S/P hysterectomy: complete     Chronic pain     Lumbar radiculopathy     IFG (impaired fasting glucose)     Overweight (BMI 25.0-29.9)            Review of Systems   Constitutional: Negative for fatigue and fever.   Respiratory: Negative for chest tightness and shortness of breath.    Cardiovascular: Negative for chest pain and leg swelling.   Musculoskeletal:        Chronic ongoing back pain, stable       Objective:      Physical Exam  Constitutional:       Appearance: She is well-developed.   HENT:      Head: Normocephalic and atraumatic.   Eyes:      Extraocular Movements: Extraocular movements intact.      Conjunctiva/sclera: Conjunctivae normal.   Neck:      Thyroid: No thyromegaly.   Pulmonary:      Effort: No respiratory distress.   Neurological:      General: No focal deficit present.      Mental Status: She is alert and oriented to person, place, and time.   Psychiatric:         Mood and Affect: Mood normal.         Behavior: Behavior  normal.         Thought Content: Thought content normal.         Judgment: Judgment normal.         Assessment:       1. Essential hypertension    2. Mixed hyperlipidemia    3. Overweight (BMI 25.0-29.9)    4. Family history of breast cancer in sister        Plan:         Buffy was seen today for hypertension.    Diagnoses and all orders for this visit:    Essential hypertension  -     amLODIPine (NORVASC) 10 MG tablet; Take 1 tablet (10 mg total) by mouth once daily.    Mixed hyperlipidemia    Overweight (BMI 25.0-29.9)    Family history of breast cancer in sister    She declines digital hypertension program  Low-salt eating, portion control, exercise and weight loss recommendations reviewed  Schedule mammogram  Labs this summer  Low salt diet, exercise, 15-20-# weight loss in next 6-12 months' time.  Call if BP > 130/80 on a regular basis.  I will review all studies and determine further tx depending on findings  Patient counseled for over 50% of the 25 minute appt, all questions answered,  chart reviewed and care coordinated.

## 2022-04-25 RX ORDER — METHOCARBAMOL 750 MG/1
750 TABLET, FILM COATED ORAL 3 TIMES DAILY PRN
Qty: 30 TABLET | Refills: 0 | Status: SHIPPED | OUTPATIENT
Start: 2022-04-25 | End: 2022-05-05

## 2022-04-25 NOTE — TELEPHONE ENCOUNTER
----- Message from Gaby Dobson sent at 4/25/2022 10:30 AM CDT -----  Type: Patient Call Back         Who called:Patient          What is the request in detail:regarding refilling medication call Metahphocardamol 750Mg          Can the clinic reply by [facility]?no          Would the patient rather a call back or a response via [facility]?call back          Best call back number:[phone] (mobile)          Additional Information:           Thank You

## 2022-04-26 ENCOUNTER — TELEPHONE (OUTPATIENT)
Dept: ORTHOPEDICS | Facility: CLINIC | Age: 62
End: 2022-04-26
Payer: COMMERCIAL

## 2022-04-26 NOTE — TELEPHONE ENCOUNTER
----- Message from Rodrigo Sampson PA-C sent at 4/26/2022  7:36 AM CDT -----  Needs to be seen 1st  ----- Message -----  From: Gaby Dobson  Sent: 4/25/2022  10:30 AM CDT  To: Rodrigo Willis    Type: Patient Call Back         Who called:Patient         What is the request in detail:Patient called in regarding if possible Dr Sampson can call in a prescription for pain in wrist ? If not she's ok with scheduling first          Can the clinic reply by [facility]?call back          Would the patient rather a call back or a response via [facility]?call back          Best call back number:[phone] (mobile)          Additional Information:           Thank You

## 2022-04-28 ENCOUNTER — HOSPITAL ENCOUNTER (OUTPATIENT)
Dept: RADIOLOGY | Facility: HOSPITAL | Age: 62
Discharge: HOME | End: 2022-04-28
Attending: PHYSICIAN ASSISTANT
Payer: COMMERCIAL

## 2022-04-28 ENCOUNTER — OFFICE VISIT (OUTPATIENT)
Dept: ORTHOPEDICS | Facility: CLINIC | Age: 62
End: 2022-04-28
Payer: COMMERCIAL

## 2022-04-28 VITALS — WEIGHT: 174.19 LBS | BODY MASS INDEX: 29.74 KG/M2 | HEIGHT: 64 IN

## 2022-04-28 DIAGNOSIS — M25.532 BILATERAL WRIST PAIN: ICD-10-CM

## 2022-04-28 DIAGNOSIS — M54.50 LOW BACK PAIN RADIATING TO LEFT LOWER EXTREMITY: Primary | ICD-10-CM

## 2022-04-28 DIAGNOSIS — M25.531 BILATERAL WRIST PAIN: ICD-10-CM

## 2022-04-28 DIAGNOSIS — M25.512 BILATERAL SHOULDER PAIN, UNSPECIFIED CHRONICITY: ICD-10-CM

## 2022-04-28 DIAGNOSIS — M25.511 BILATERAL SHOULDER PAIN, UNSPECIFIED CHRONICITY: ICD-10-CM

## 2022-04-28 DIAGNOSIS — M54.50 LOW BACK PAIN RADIATING TO LEFT LOWER EXTREMITY: ICD-10-CM

## 2022-04-28 DIAGNOSIS — M79.605 LOW BACK PAIN RADIATING TO LEFT LOWER EXTREMITY: Primary | ICD-10-CM

## 2022-04-28 DIAGNOSIS — M79.605 LOW BACK PAIN RADIATING TO LEFT LOWER EXTREMITY: ICD-10-CM

## 2022-04-28 PROCEDURE — 3044F HG A1C LEVEL LT 7.0%: CPT | Mod: CPTII,S$GLB,, | Performed by: PHYSICIAN ASSISTANT

## 2022-04-28 PROCEDURE — 1159F MED LIST DOCD IN RCRD: CPT | Mod: CPTII,S$GLB,, | Performed by: PHYSICIAN ASSISTANT

## 2022-04-28 PROCEDURE — 73030 X-RAY EXAM OF SHOULDER: CPT | Mod: TC,50

## 2022-04-28 PROCEDURE — 1160F PR REVIEW ALL MEDS BY PRESCRIBER/CLIN PHARMACIST DOCUMENTED: ICD-10-PCS | Mod: CPTII,S$GLB,, | Performed by: PHYSICIAN ASSISTANT

## 2022-04-28 PROCEDURE — 99999 PR PBB SHADOW E&M-EST. PATIENT-LVL IV: CPT | Mod: PBBFAC,,, | Performed by: PHYSICIAN ASSISTANT

## 2022-04-28 PROCEDURE — 99213 OFFICE O/P EST LOW 20 MIN: CPT | Mod: S$GLB,,, | Performed by: PHYSICIAN ASSISTANT

## 2022-04-28 PROCEDURE — 3044F PR MOST RECENT HEMOGLOBIN A1C LEVEL <7.0%: ICD-10-PCS | Mod: CPTII,S$GLB,, | Performed by: PHYSICIAN ASSISTANT

## 2022-04-28 PROCEDURE — 1160F RVW MEDS BY RX/DR IN RCRD: CPT | Mod: CPTII,S$GLB,, | Performed by: PHYSICIAN ASSISTANT

## 2022-04-28 PROCEDURE — 3008F PR BODY MASS INDEX (BMI) DOCUMENTED: ICD-10-PCS | Mod: CPTII,S$GLB,, | Performed by: PHYSICIAN ASSISTANT

## 2022-04-28 PROCEDURE — 72050 XR CERVICAL SPINE AP LAT WITH FLEX EXTEN: ICD-10-PCS | Mod: 26,,, | Performed by: RADIOLOGY

## 2022-04-28 PROCEDURE — 73110 XR WRIST COMPLETE 3 VIEWS BILATERAL: ICD-10-PCS | Mod: 26,50,, | Performed by: RADIOLOGY

## 2022-04-28 PROCEDURE — 99213 PR OFFICE/OUTPT VISIT, EST, LEVL III, 20-29 MIN: ICD-10-PCS | Mod: S$GLB,,, | Performed by: PHYSICIAN ASSISTANT

## 2022-04-28 PROCEDURE — 73030 X-RAY EXAM OF SHOULDER: CPT | Mod: 26,50,, | Performed by: RADIOLOGY

## 2022-04-28 PROCEDURE — 99999 PR PBB SHADOW E&M-EST. PATIENT-LVL IV: ICD-10-PCS | Mod: PBBFAC,,, | Performed by: PHYSICIAN ASSISTANT

## 2022-04-28 PROCEDURE — 73110 X-RAY EXAM OF WRIST: CPT | Mod: TC,50

## 2022-04-28 PROCEDURE — 72050 X-RAY EXAM NECK SPINE 4/5VWS: CPT | Mod: 26,,, | Performed by: RADIOLOGY

## 2022-04-28 PROCEDURE — 73030 XR SHOULDER COMPLETE 2 OR MORE VIEWS BILATERAL: ICD-10-PCS | Mod: 26,50,, | Performed by: RADIOLOGY

## 2022-04-28 PROCEDURE — 73110 X-RAY EXAM OF WRIST: CPT | Mod: 26,50,, | Performed by: RADIOLOGY

## 2022-04-28 PROCEDURE — 72050 X-RAY EXAM NECK SPINE 4/5VWS: CPT | Mod: TC

## 2022-04-28 PROCEDURE — 1159F PR MEDICATION LIST DOCUMENTED IN MEDICAL RECORD: ICD-10-PCS | Mod: CPTII,S$GLB,, | Performed by: PHYSICIAN ASSISTANT

## 2022-04-28 PROCEDURE — 3008F BODY MASS INDEX DOCD: CPT | Mod: CPTII,S$GLB,, | Performed by: PHYSICIAN ASSISTANT

## 2022-04-28 RX ORDER — MELOXICAM 15 MG/1
15 TABLET ORAL DAILY
Qty: 30 TABLET | Refills: 1 | Status: SHIPPED | OUTPATIENT
Start: 2022-04-28 | End: 2022-06-29

## 2022-04-29 NOTE — PROGRESS NOTES
SUBJECTIVE:     Chief Complaint & History of Present Illness:  Buffy Morin is a  established  patient 61 y.o. female who is seen here today with a complaint of    Chief Complaint   Patient presents with    Left Shoulder - Pain    Right Shoulder - Pain    Left Wrist - Pain    Right Wrist - Pain    .  Patient is here today for evaluation treatment soreness and pain in bilateral shoulders and wrists.  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  States over the past several months he has had progressively worsening pain soreness in the trapezius and deltoid regions of her shoulders with radiation down the arms to the wrist.  She also states she feels she has weakened  strength secondary to this discomfort.  She has not taken any over-the-counter or prescription medications for this condition to date state.  Review of patient's chart demonstrates she has been evaluated and treated for both cervical and lumbar spondylosis by both myself and Nalini Lynn PA-C spine service has undergone physical therapy Medrol Dosepaks and Robaxin.  She did undergo MRI of the lumbar spine and and subsequent JASON injections.  And appears to have had good results.  Additional she was evaluated for bilateral carpal tunnel syndrome 11/11/2014 EMG proved negative at that time She has not had any further evaluation to her cervical spine  On a scale of 1-10, with 10 being worst pain imaginable, he rates this pain as 4 on good days and 8 on bad days.  she describes the pain as sore.    Review of patient's allergies indicates:   Allergen Reactions    No known drug allergies          Current Outpatient Medications   Medication Sig Dispense Refill    amLODIPine (NORVASC) 10 MG tablet Take 1 tablet (10 mg total) by mouth once daily. 90 tablet 3    atorvastatin (LIPITOR) 40 MG tablet Take 1 tablet (40 mg total) by mouth once  daily. 90 tablet 1    cholecalciferol, vitamin D3, (VITAMIN D3) 50 mcg (2,000 unit) Cap Take 1 capsule by mouth Daily. Take vitamin D 2000 units every day.      diclofenac sodium (VOLTAREN) 1 % Gel Apply two grams to affected area 3-4 times as needed 1 each 3    EScitalopram oxalate (LEXAPRO) 10 MG tablet Take 1 tablet (10 mg total) by mouth once daily. 90 tablet 3    gabapentin (NEURONTIN) 100 MG capsule Take 1 capsule (100 mg total) by mouth 3 (three) times daily. 90 capsule 0    ibuprofen (ADVIL,MOTRIN) 800 MG tablet Take 800 mg by mouth every 8 (eight) hours as needed.      LORazepam (ATIVAN) 0.5 MG tablet TAKE 1 TABLET BY MOUTH DAILY AS NEEDED FOR ANXIETY (NO MORE THAN 2-3X WEEKLY) 30 tablet 0    methocarbamoL (ROBAXIN) 750 MG Tab Take 1 tablet (750 mg total) by mouth 3 (three) times daily as needed. 30 tablet 0    meloxicam (MOBIC) 15 MG tablet Take 1 tablet (15 mg total) by mouth once daily. 30 tablet 1     No current facility-administered medications for this visit.       Past Medical History:   Diagnosis Date    Abnormal Pap smear of cervix     Anxiety     At high risk for breast cancer: TC 7 > 20% 2019 2/6/2019    Carpal tunnel syndrome     Elevated blood pressure     Family history of breast cancer in sister 10/1/2013    Hypertension     S/P hysterectomy 9/23/2017       Past Surgical History:   Procedure Laterality Date    BREAST BIOPSY Right     2007    COLONOSCOPY N/A 8/19/2019    Procedure: COLONOSCOPY;  Surgeon: ROHIT Romero MD;  Location: [address]);  Service: Endoscopy;  Laterality: N/A;    HYSTERECTOMY      Total    TRANSFORAMINAL EPIDURAL INJECTION OF STEROID Bilateral 12/23/2019    Procedure: LUMBAR TRANSFORAMINAL BILATERAL L4/5 TF JASON;  Surgeon: Hernesto Lazo MD;  Location: [facility];  Service: Pain Management;  Laterality: Bilateral;  NEEDS CONSENT    TRANSFORAMINAL EPIDURAL INJECTION OF STEROID Bilateral 1/16/2020    Procedure: LUMBAR TRANSFORAMINAL  "BILATERAL L4/5 TF JASON DIRECT REFERRAL;  Surgeon: Pamela Erazo MD;  Location: [facility];  Service: Pain Management;  Laterality: Bilateral;  NEEDS CONSENT    TUBAL LIGATION         Vital Signs (Most Recent)  There were no vitals filed for this visit.    Review of Systems:  ROS:  Constitutional: no fever or chills  Eyes: no visual changes  ENT: no nasal congestion or sore throat  Respiratory: no cough or shortness of breath  Cardiovascular: no chest pain or palpitations  Gastrointestinal: no nausea or vomiting, tolerating diet  Genitourinary: no hematuria or dysuria  Integument/Breast: no rash or pruritis  Hematologic/Lymphatic: no easy bruising or lymphadenopathy  Musculoskeletal: no arthralgias or myalgias  Neurological: no seizures or tremors, Positive carpal tunnel syndrome, chronic pain, lumbar radiculopathy  Behavioral/Psych: no auditory or visual hallucinations, Positive for anxiety  Endocrine: no heat or cold intolerance, Positive vitamin-D deficiency      OBJECTIVE:     PHYSICAL EXAM:  Height: 5' 4" (162.6 cm) Weight: 79 kg (174 lb 3.2 oz), General Appearance: Well nourished, well developed, in no acute distress.  Neurological: Mood & affect are normal.  Shoulder exam: left  Tenderness: biceps tendon, deltoid muscle, globally  ROM: forward flexion 180/180, extension 45/45, full abduction 180/180, abduction-glenohumeral 90/90, external rotation 50/50, pain at the extremes of mobility  Shoulder Strength: biceps 5/5, triceps 5/5, abduction 5/5, adduction 5/5, external rotation 5/5 with shoulder at side, flexion 5/5, and extension 5/5  negative for tenderness about the glenohumeral joint, negative for tenderness over the acromioclavicular joint and negative for impingement sign  Stability tests: anterior apprehension test negative and posterior apprehension test negative  Special Tests:Cross-chest abduction: negative and Gloucester's test: negative     Shoulder exam: right  Tenderness: biceps tendon, deltoid " muscle  ROM: forward flexion 180/180, extension 45/45, full abduction 180/180, abduction-glenohumeral 90/90, external rotation 50/50  Shoulder Strength: biceps 5/5, triceps 5/5, abduction 5/5, adduction 5/5, external rotation 5/5 with shoulder at side, flexion 5/5, and extension 5/5  negative for tenderness about the glenohumeral joint, negative for tenderness over the acromioclavicular joint and negative for impingement sign  Stability tests: anterior apprehension test negative and posterior apprehension test negative  Special Tests:Cross-chest abduction: negative and Cowley's test: negative      bilateral hand, wrist and forearm   History of injury: repetitive injury  Pain: Description: moderate, intermittent and worse if dependent  Night pain: yes, occasional and moderate  Rest pain: yes and mild  Quality: aching  Location: wrist, dorsal, palmar and radial and dorsal hand  Exacerbating factors: activity, gripping, lifting and repetitive activity  Alleviating factors: nothing  Radiates to: forearm  Neurological complaints: Numbness: mild, moderate and intermittent.  Location: wrist, dorsal and palmar and dorsal hand  Mass/Swelling: none  Vascular complaints: none  Condition of skin:intact  Hand Exam: negative Phalen, negative Tinel and negative Cozen  ROM:fingers flex to palm and thumb flexes to palm    Wrist Exam: palmar flexion 90/90, dorsiflexion 90/90, pronation 90/90, supination 90/90, flexion contracture 0/0, extension contracture 0/0, radial deviation 25/25, ulnar deviation 25/25      RADIOGRAPHS:  X-rays shoulders taken today films reviewed by me demonstrate mild arthritic changes in the right shoulder with a with sclerotic changes in the humeral head and some glenohumeral joint space narrowing early degenerative changes noted at the AC joint.  Left shoulder demonstrates little to no degenerative change joint spaces well maintained    X-rays of the wrist demonstrate mild scapholunate opening bilaterally  this is unchanged dating back to 2016     X-rays of cervical spine taken today demonstrate disc space narrowing and early osteophytic spurring C3 through C7 without marked foraminal impingement    ASSESSMENT/PLAN:       ICD-10-CM ICD-9-CM   1. Low back pain radiating to left lower extremity  M54.50 724.2    M79.605    2. Bilateral shoulder pain, unspecified chronicity  M25.511 719.41    M25.512    3. Bilateral wrist pain  M25.531 719.43    M25.532        Plan:  Will give conservative care  Meloxicam 15 mg q.d. with food times 10 days followed by p.r.n.  Schedule follow-up with Spine Service for her cervical spine and consideration for re-evaluation of carpal tunnel syndrome

## 2022-06-06 ENCOUNTER — TELEPHONE (OUTPATIENT)
Dept: ORTHOPEDICS | Facility: CLINIC | Age: 62
End: 2022-06-06
Payer: COMMERCIAL

## 2022-06-06 NOTE — TELEPHONE ENCOUNTER
Spoke to patient in regards to message. Stated to patient that  Rodrigo Sampson is out of the office on Monday's, and I will give him the message and he will give her a call in the morning. Patient stated thank you. Thanks.

## 2022-06-07 ENCOUNTER — TELEPHONE (OUTPATIENT)
Dept: ORTHOPEDICS | Facility: CLINIC | Age: 62
End: 2022-06-07
Payer: COMMERCIAL

## 2022-06-09 ENCOUNTER — TELEPHONE (OUTPATIENT)
Dept: INTERNAL MEDICINE | Facility: CLINIC | Age: 62
End: 2022-06-09
Payer: COMMERCIAL

## 2022-06-09 NOTE — TELEPHONE ENCOUNTER
----- Message from Heather De León sent at 6/9/2022 12:25 PM CDT -----  Regarding: advice  Contact: patient  [phone]  When is next shingles shot.

## 2022-06-09 NOTE — TELEPHONE ENCOUNTER
Spoke to patient and advised of next shingles shot is due 2-6 months after first shot.  Patient verbalized understanding.

## 2022-06-13 ENCOUNTER — TELEPHONE (OUTPATIENT)
Dept: INTERNAL MEDICINE | Facility: CLINIC | Age: 62
End: 2022-06-13
Payer: COMMERCIAL

## 2022-06-13 NOTE — TELEPHONE ENCOUNTER
----- Message from Lisa Diez sent at 6/13/2022  9:24 AM CDT -----  Contact: Self/[phone]  Pt said that she is calling in regards to she is scheduled to have labs on tomorrow pt wants to know if it miguel be okay for her to have her Shingles shot after she does her labs. Please advise

## 2022-06-13 NOTE — TELEPHONE ENCOUNTER
Spoke to pt and she notified that she can have the vaccine after her lab   Pt voiced understanding

## 2022-06-14 ENCOUNTER — LAB VISIT (OUTPATIENT)
Dept: LAB | Facility: HOSPITAL | Age: 62
End: 2022-06-14
Attending: INTERNAL MEDICINE
Payer: COMMERCIAL

## 2022-06-14 DIAGNOSIS — E78.2 MIXED HYPERLIPIDEMIA: ICD-10-CM

## 2022-06-14 LAB
AST SERPL-CCNC: 20 U/L (ref 10–40)
CHOLEST SERPL-MCNC: 205 MG/DL (ref 120–199)
CHOLEST/HDLC SERPL: 3.4 {RATIO} (ref 2–5)
HDLC SERPL-MCNC: 61 MG/DL (ref 40–75)
HDLC SERPL: 29.8 % (ref 20–50)
LDLC SERPL CALC-MCNC: 131.2 MG/DL (ref 63–159)
NONHDLC SERPL-MCNC: 144 MG/DL
TRIGL SERPL-MCNC: 64 MG/DL (ref 30–150)

## 2022-06-14 PROCEDURE — 80061 LIPID PANEL: CPT | Performed by: INTERNAL MEDICINE

## 2022-06-14 PROCEDURE — 36415 COLL VENOUS BLD VENIPUNCTURE: CPT | Performed by: INTERNAL MEDICINE

## 2022-06-14 PROCEDURE — 84450 TRANSFERASE (AST) (SGOT): CPT | Performed by: INTERNAL MEDICINE

## 2022-06-28 ENCOUNTER — OFFICE VISIT (OUTPATIENT)
Dept: ORTHOPEDICS | Facility: CLINIC | Age: 62
End: 2022-06-28
Payer: COMMERCIAL

## 2022-06-28 VITALS
DIASTOLIC BLOOD PRESSURE: 86 MMHG | SYSTOLIC BLOOD PRESSURE: 138 MMHG | WEIGHT: 176.38 LBS | BODY MASS INDEX: 30.11 KG/M2 | HEART RATE: 78 BPM | HEIGHT: 64 IN

## 2022-06-28 DIAGNOSIS — M25.512 BILATERAL SHOULDER PAIN, UNSPECIFIED CHRONICITY: ICD-10-CM

## 2022-06-28 DIAGNOSIS — M25.511 BILATERAL SHOULDER PAIN, UNSPECIFIED CHRONICITY: ICD-10-CM

## 2022-06-28 DIAGNOSIS — M50.30 DEGENERATIVE DISC DISEASE, CERVICAL: Primary | ICD-10-CM

## 2022-06-28 PROCEDURE — 3008F PR BODY MASS INDEX (BMI) DOCUMENTED: ICD-10-PCS | Mod: CPTII,S$GLB,, | Performed by: PHYSICIAN ASSISTANT

## 2022-06-28 PROCEDURE — 3079F DIAST BP 80-89 MM HG: CPT | Mod: CPTII,S$GLB,, | Performed by: PHYSICIAN ASSISTANT

## 2022-06-28 PROCEDURE — 99213 PR OFFICE/OUTPT VISIT, EST, LEVL III, 20-29 MIN: ICD-10-PCS | Mod: S$GLB,,, | Performed by: PHYSICIAN ASSISTANT

## 2022-06-28 PROCEDURE — 1159F MED LIST DOCD IN RCRD: CPT | Mod: CPTII,S$GLB,, | Performed by: PHYSICIAN ASSISTANT

## 2022-06-28 PROCEDURE — 3044F PR MOST RECENT HEMOGLOBIN A1C LEVEL <7.0%: ICD-10-PCS | Mod: CPTII,S$GLB,, | Performed by: PHYSICIAN ASSISTANT

## 2022-06-28 PROCEDURE — 1160F RVW MEDS BY RX/DR IN RCRD: CPT | Mod: CPTII,S$GLB,, | Performed by: PHYSICIAN ASSISTANT

## 2022-06-28 PROCEDURE — 3075F PR MOST RECENT SYSTOLIC BLOOD PRESS GE 130-139MM HG: ICD-10-PCS | Mod: CPTII,S$GLB,, | Performed by: PHYSICIAN ASSISTANT

## 2022-06-28 PROCEDURE — 3079F PR MOST RECENT DIASTOLIC BLOOD PRESSURE 80-89 MM HG: ICD-10-PCS | Mod: CPTII,S$GLB,, | Performed by: PHYSICIAN ASSISTANT

## 2022-06-28 PROCEDURE — 99999 PR PBB SHADOW E&M-EST. PATIENT-LVL IV: CPT | Mod: PBBFAC,,, | Performed by: PHYSICIAN ASSISTANT

## 2022-06-28 PROCEDURE — 3044F HG A1C LEVEL LT 7.0%: CPT | Mod: CPTII,S$GLB,, | Performed by: PHYSICIAN ASSISTANT

## 2022-06-28 PROCEDURE — 3075F SYST BP GE 130 - 139MM HG: CPT | Mod: CPTII,S$GLB,, | Performed by: PHYSICIAN ASSISTANT

## 2022-06-28 PROCEDURE — 3008F BODY MASS INDEX DOCD: CPT | Mod: CPTII,S$GLB,, | Performed by: PHYSICIAN ASSISTANT

## 2022-06-28 PROCEDURE — 99999 PR PBB SHADOW E&M-EST. PATIENT-LVL IV: ICD-10-PCS | Mod: PBBFAC,,, | Performed by: PHYSICIAN ASSISTANT

## 2022-06-28 PROCEDURE — 99213 OFFICE O/P EST LOW 20 MIN: CPT | Mod: S$GLB,,, | Performed by: PHYSICIAN ASSISTANT

## 2022-06-28 PROCEDURE — 1160F PR REVIEW ALL MEDS BY PRESCRIBER/CLIN PHARMACIST DOCUMENTED: ICD-10-PCS | Mod: CPTII,S$GLB,, | Performed by: PHYSICIAN ASSISTANT

## 2022-06-28 PROCEDURE — 1159F PR MEDICATION LIST DOCUMENTED IN MEDICAL RECORD: ICD-10-PCS | Mod: CPTII,S$GLB,, | Performed by: PHYSICIAN ASSISTANT

## 2022-06-28 NOTE — PROGRESS NOTES
SUBJECTIVE:     Chief Complaint & History of Present Illness:  Buffy Morin is a Established  patient 61 y.o. female who is seen here today with a complaint of    Chief Complaint   Patient presents with    Right Shoulder - Pain    Left Shoulder - Pain    .  Patient returns today for continuing care for bilateral shoulder and neck discomfort was last seen treated by me for this condition 04/28/2022.  At that time we had planned to consult to spine service for further evaluation treatment.  Unfortunately there were communication issues in the patient has not been following.  She continues to struggle with pain soreness in bilateral deltoid regions with some radiation up towards her neck  On a scale of 1-10, with 10 being worst pain imaginable, he rates this pain as 4 on good days and 6 on bad days.  she describes the pain as sore.    Review of patient's allergies indicates:   Allergen Reactions    No known drug allergies          Current Outpatient Medications   Medication Sig Dispense Refill    amLODIPine (NORVASC) 10 MG tablet Take 1 tablet (10 mg total) by mouth once daily. 90 tablet 3    atorvastatin (LIPITOR) 40 MG tablet Take 1 tablet (40 mg total) by mouth once daily. 90 tablet 1    cholecalciferol, vitamin D3, (VITAMIN D3) 50 mcg (2,000 unit) Cap Take 1 capsule by mouth Daily. Take vitamin D 2000 units every day.      diclofenac sodium (VOLTAREN) 1 % Gel Apply two grams to affected area 3-4 times as needed 1 each 3    EScitalopram oxalate (LEXAPRO) 10 MG tablet Take 1 tablet (10 mg total) by mouth once daily. 90 tablet 3    gabapentin (NEURONTIN) 100 MG capsule Take 1 capsule (100 mg total) by mouth 3 (three) times daily. 90 capsule 0    ibuprofen (ADVIL,MOTRIN) 800 MG tablet Take 800 mg by mouth every 8 (eight) hours as needed.      LORazepam (ATIVAN) 0.5 MG tablet TAKE 1 TABLET BY MOUTH DAILY AS NEEDED FOR ANXIETY (NO MORE THAN 2-3X WEEKLY) 30 tablet 0    varicella-zoster gE-AS01B, PF,  (SHINGRIX, PF,) 50 mcg/0.5 mL injection Inject 0.5 mLs into the muscle once. for 1 dose 1 each 0     No current facility-administered medications for this visit.       Past Medical History:   Diagnosis Date    Abnormal Pap smear of cervix     Anxiety     At high risk for breast cancer: TC 7 > 20% 2019 2/6/2019    Carpal tunnel syndrome     Elevated blood pressure     Family history of breast cancer in sister 10/1/2013    Hypertension     S/P hysterectomy 9/23/2017       Past Surgical History:   Procedure Laterality Date    BREAST BIOPSY Right     2007    COLONOSCOPY N/A 8/19/2019    Procedure: COLONOSCOPY;  Surgeon: ROHIT Romero MD;  Location: [facility] ([address] [place] [address]);  Service: Endoscopy;  Laterality: N/A;    HYSTERECTOMY      Total    TRANSFORAMINAL EPIDURAL INJECTION OF STEROID Bilateral 12/23/2019    Procedure: LUMBAR TRANSFORAMINAL BILATERAL L4/5 TF JASON;  Surgeon: Hernesto Lazo MD;  [facility];  Service: Pain Management;  Laterality: Bilateral;  NEEDS CONSENT    TRANSFORAMINAL EPIDURAL INJECTION OF STEROID Bilateral 1/16/2020    Procedure: LUMBAR TRANSFORAMINAL BILATERAL L4/5 TF JASON DIRECT REFERRAL;  Surgeon: Pamela Erazo MD;  [facility];  Service: Pain Management;  Laterality: Bilateral;  NEEDS CONSENT    TUBAL LIGATION         Vital Signs (Most Recent)  Vitals:    06/28/22 1609   BP: 138/86   Pulse: 78           Review of Systems:  ROS:  Constitutional: no fever or chills  Eyes: no visual changes  ENT: no nasal congestion or sore throat  Respiratory: no cough or shortness of breath  Cardiovascular: no chest pain or palpitations  Gastrointestinal: no nausea or vomiting, tolerating diet  Genitourinary: no hematuria or dysuria  Integument/Breast: no rash or pruritis  Hematologic/Lymphatic: no easy bruising or lymphadenopathy  Musculoskeletal: no arthralgias or myalgias  Neurological: no seizures or tremors, Positive carpal tunnel syndrome, chronic pain, lumbar  "radiculopathy  Behavioral/Psych: no auditory or visual hallucinations, Positive for anxiety  Endocrine: no heat or cold intolerance, Positive vitamin-D deficiency              OBJECTIVE:     PHYSICAL EXAM:  Height: 5' 4" (162.6 cm) Weight: 80 kg (176 lb 5.9 oz), General Appearance: Well nourished, well developed, in no acute distress.  Neurological: Mood & affect are normal.  Back Exam:  bilateral tenderness: moderate, bilateral paraspinous spasm, limitation of flexion: moderate    Tenderness: Cervical   Swelling:  None       Range of Motion:      Flexion: 70     Extension: 50     Lateral Bend:   Right 45                              Left 45     Rotation:          Right 80                              Left 80           Reflexes     Biceps: Normal    Triceps: Normal       Sensation: Normal     RADIOGRAPHS:  X-rays of cervical spine from previous visit demonstrate degenerative disc disease C3 through C7 with foraminal narrowing marginal spurring    ASSESSMENT/PLAN:       ICD-10-CM ICD-9-CM   1. Degenerative disc disease, cervical  M50.30 722.4   2. Bilateral shoulder pain, unspecified chronicity  M25.511 719.41    M25.512        Plan: We discussed with the patient at length all the different treatment options available for her spine including anti-inflammatories, acetaminophen, rest, ice, physical therapy, strengthening and range of motion exercise, occasional cortisone injections for temporary relief, and finally possible surgical interventions  Schedule MRI of the cervical spine follow-up appointment with spine service    "

## 2022-06-29 ENCOUNTER — TELEPHONE (OUTPATIENT)
Dept: INTERNAL MEDICINE | Facility: CLINIC | Age: 62
End: 2022-06-29
Payer: COMMERCIAL

## 2022-06-29 NOTE — TELEPHONE ENCOUNTER
----- Message from Stewart Morin sent at 6/29/2022  2:33 PM CDT -----  Contact: self [phone]  Pt requesting a call for advice on shingle shot stated she got injected yesterday and have a knot on her arm today and need suggestions on what she can do.    Please call and advise

## 2022-07-01 ENCOUNTER — TELEPHONE (OUTPATIENT)
Dept: INTERNAL MEDICINE | Facility: CLINIC | Age: 62
End: 2022-07-01
Payer: COMMERCIAL

## 2022-07-01 NOTE — TELEPHONE ENCOUNTER
Patient did not read Susan message.  Please call to review information and let me know if they have any questions    Cholesterol not at target range, is she taking meds?  thanks

## 2022-07-07 ENCOUNTER — TELEPHONE (OUTPATIENT)
Dept: ORTHOPEDICS | Facility: CLINIC | Age: 62
End: 2022-07-07
Payer: COMMERCIAL

## 2022-07-07 NOTE — TELEPHONE ENCOUNTER
----- Message from Gunjan Childs sent at 7/7/2022 10:18 AM CDT -----  Contact: [phone] Patient  Pt states she was able to get an earlier MRI appt (07/11). Pt is requesting her 07/21 appt be scheduled sooner. Please call and advise.

## 2022-07-07 NOTE — TELEPHONE ENCOUNTER
Spoke with patient regarding rescheduling her spine clinic appointment to a sooner date because she moved her mri appointment up.

## 2022-07-10 ENCOUNTER — TELEPHONE (OUTPATIENT)
Dept: INTERNAL MEDICINE | Facility: CLINIC | Age: 62
End: 2022-07-10
Payer: COMMERCIAL

## 2022-07-10 DIAGNOSIS — E78.2 MIXED HYPERLIPIDEMIA: Primary | ICD-10-CM

## 2022-07-11 ENCOUNTER — HOSPITAL ENCOUNTER (OUTPATIENT)
Dept: RADIOLOGY | Facility: HOSPITAL | Age: 62
Discharge: HOME | End: 2022-07-11
Attending: PHYSICIAN ASSISTANT
Payer: COMMERCIAL

## 2022-07-11 DIAGNOSIS — M50.30 DEGENERATIVE DISC DISEASE, CERVICAL: ICD-10-CM

## 2022-07-11 PROCEDURE — 72141 MRI CERVICAL SPINE WITHOUT CONTRAST: ICD-10-PCS | Mod: 26,,, | Performed by: RADIOLOGY

## 2022-07-11 PROCEDURE — 72141 MRI NECK SPINE W/O DYE: CPT | Mod: TC

## 2022-07-11 PROCEDURE — 72141 MRI NECK SPINE W/O DYE: CPT | Mod: 26,,, | Performed by: RADIOLOGY

## 2022-07-11 NOTE — TELEPHONE ENCOUNTER
Has not read [employer] message, letter sent today with  results and recommendations    She has also not responded to phone calls

## 2022-07-12 RX ORDER — ATORVASTATIN CALCIUM 80 MG/1
80 TABLET, FILM COATED ORAL DAILY
Qty: 90 TABLET | Refills: 1 | Status: SHIPPED | OUTPATIENT
Start: 2022-07-12 | End: 2023-01-10

## 2022-07-12 NOTE — TELEPHONE ENCOUNTER
Spoke to patient and advised of new medication dosage and need for repeat labs and Mammo.    Patient verbalized understanding    All appt scheduled

## 2022-07-12 NOTE — TELEPHONE ENCOUNTER
Thank you, I recommend she increase to 80 mg and I have sent to her pharmacy    Repeat labs in 3 months, orders in, thank you    Also, she is due for her mammogram currently, please encourage her to schedule this (orders in)

## 2022-07-12 NOTE — TELEPHONE ENCOUNTER
Spoke to patient and advised of elevated cholesterol level.     Patient stated that she has been taking her medication everyday.

## 2022-07-14 ENCOUNTER — OFFICE VISIT (OUTPATIENT)
Dept: ORTHOPEDICS | Facility: CLINIC | Age: 62
End: 2022-07-14
Payer: COMMERCIAL

## 2022-07-14 VITALS — WEIGHT: 175.81 LBS | HEIGHT: 64 IN | BODY MASS INDEX: 30.02 KG/M2

## 2022-07-14 DIAGNOSIS — M54.12 CERVICAL RADICULOPATHY: Primary | ICD-10-CM

## 2022-07-14 PROCEDURE — 1159F MED LIST DOCD IN RCRD: CPT | Mod: CPTII,S$GLB,, | Performed by: ORTHOPAEDIC SURGERY

## 2022-07-14 PROCEDURE — 3044F HG A1C LEVEL LT 7.0%: CPT | Mod: CPTII,S$GLB,, | Performed by: ORTHOPAEDIC SURGERY

## 2022-07-14 PROCEDURE — 3008F PR BODY MASS INDEX (BMI) DOCUMENTED: ICD-10-PCS | Mod: CPTII,S$GLB,, | Performed by: ORTHOPAEDIC SURGERY

## 2022-07-14 PROCEDURE — 1159F PR MEDICATION LIST DOCUMENTED IN MEDICAL RECORD: ICD-10-PCS | Mod: CPTII,S$GLB,, | Performed by: ORTHOPAEDIC SURGERY

## 2022-07-14 PROCEDURE — 99999 PR PBB SHADOW E&M-EST. PATIENT-LVL II: CPT | Mod: PBBFAC,,, | Performed by: ORTHOPAEDIC SURGERY

## 2022-07-14 PROCEDURE — 3008F BODY MASS INDEX DOCD: CPT | Mod: CPTII,S$GLB,, | Performed by: ORTHOPAEDIC SURGERY

## 2022-07-14 PROCEDURE — 99999 PR PBB SHADOW E&M-EST. PATIENT-LVL II: ICD-10-PCS | Mod: PBBFAC,,, | Performed by: ORTHOPAEDIC SURGERY

## 2022-07-14 PROCEDURE — 99214 OFFICE O/P EST MOD 30 MIN: CPT | Mod: S$GLB,,, | Performed by: ORTHOPAEDIC SURGERY

## 2022-07-14 PROCEDURE — 99214 PR OFFICE/OUTPT VISIT, EST, LEVL IV, 30-39 MIN: ICD-10-PCS | Mod: S$GLB,,, | Performed by: ORTHOPAEDIC SURGERY

## 2022-07-14 PROCEDURE — 3044F PR MOST RECENT HEMOGLOBIN A1C LEVEL <7.0%: ICD-10-PCS | Mod: CPTII,S$GLB,, | Performed by: ORTHOPAEDIC SURGERY

## 2022-07-14 RX ORDER — METHYLPREDNISOLONE 4 MG/1
TABLET ORAL
Qty: 1 EACH | Refills: 0 | Status: SHIPPED | OUTPATIENT
Start: 2022-07-14 | End: 2022-08-04

## 2022-07-14 NOTE — H&P (VIEW-ONLY)
DATE: 7/14/2022  PATIENT: Buffy Morin    Supervising Physician: Cy Solis M.D.    CHIEF COMPLAINT: bilateral shoulder pain    HISTORY:  Buffy Morin is a 61 y.o. female here for initial evaluation of bilateral shoulder pain (7/10). The pain has been present for months. The patient describes the pain as aching. The pain is worse with nothing and improved by nothing and occurs constantly. There is negative associated numbness and tingling. There is negative subjective weakness. Prior treatments have included PT for 6 weeks with no relief, but no ESIs or surgery. Pt was seen by Rodrigo Sampson PA-C on 6/28/22 for similar complaints.     The patient denies myelopathic symptoms such as handwriting changes or difficulty with buttons/coins/keys. Denies perineal paresthesias, bowel/bladder dysfunction.    PAST MEDICAL/SURGICAL HISTORY:  Past Medical History:   Diagnosis Date    Abnormal Pap smear of cervix     Anxiety     At high risk for breast cancer: TC 7 > 20% 2019 2/6/2019    Carpal tunnel syndrome     Elevated blood pressure     Family history of breast cancer in sister 10/1/2013    Hypertension     S/P hysterectomy 9/23/2017     Past Surgical History:   Procedure Laterality Date    BREAST BIOPSY Right     2007    COLONOSCOPY N/A 8/19/2019    Procedure: COLONOSCOPY;  Surgeon: ROIHT Romero MD;  Location: [address];  Service: Endoscopy;  Laterality: N/A;    HYSTERECTOMY      Total    TRANSFORAMINAL EPIDURAL INJECTION OF STEROID Bilateral 12/23/2019    Procedure: LUMBAR TRANSFORAMINAL BILATERAL L4/5 TF JASON;  Surgeon: Hernesto Lazo MD;  Location: [facility];  Service: Pain Management;  Laterality: Bilateral;  NEEDS CONSENT    TRANSFORAMINAL EPIDURAL INJECTION OF STEROID Bilateral 1/16/2020    Procedure: LUMBAR TRANSFORAMINAL BILATERAL L4/5 TF JASON DIRECT REFERRAL;  Surgeon: Pamela Erazo MD;  Location: [facility];  Service: Pain Management;  Laterality: Bilateral;  NEEDS CONSENT     TUBAL LIGATION         Medications:  Current Outpatient Medications on File Prior to Visit   Medication Sig Dispense Refill    amLODIPine (NORVASC) 10 MG tablet Take 1 tablet (10 mg total) by mouth once daily. 90 tablet 3    atorvastatin (LIPITOR) 80 MG tablet Take 1 tablet (80 mg total) by mouth once daily. 90 tablet 1    cholecalciferol, vitamin D3, (VITAMIN D3) 50 mcg (2,000 unit) Cap Take 1 capsule by mouth Daily. Take vitamin D 2000 units every day.      diclofenac sodium (VOLTAREN) 1 % Gel Apply two grams to affected area 3-4 times as needed 1 each 3    EScitalopram oxalate (LEXAPRO) 10 MG tablet Take 1 tablet (10 mg total) by mouth once daily. 90 tablet 3    gabapentin (NEURONTIN) 100 MG capsule Take 1 capsule (100 mg total) by mouth 3 (three) times daily. 90 capsule 0    LORazepam (ATIVAN) 0.5 MG tablet TAKE 1 TABLET BY MOUTH DAILY AS NEEDED FOR ANXIETY (NO MORE THAN 2-3X WEEKLY) 30 tablet 0    meloxicam (MOBIC) 15 MG tablet TAKE 1 TABLET BY MOUTH EVERY DAY 30 tablet 1     No current facility-administered medications on file prior to visit.       Social History:   Social History     Socioeconomic History    Marital status:     Number of children: 2   Tobacco Use    Smoking status: Never Smoker    Smokeless tobacco: Never Used   Substance and Sexual Activity    Alcohol use: Yes     Comment: rare    Drug use: No    Sexual activity: Yes       REVIEW OF SYSTEMS:  Constitution: Negative. Negative for chills, fever and night sweats.   Cardiovascular: Negative for chest pain and syncope.   Respiratory: Negative for cough and shortness of breath.   Gastrointestinal: See HPI. Negative for nausea/vomiting. Negative for abdominal pain.  Genitourinary: See HPI. Negative for discoloration or dysuria.  Skin: Negative for dry skin, itching and rash.   Hematologic/Lymphatic: Negative for bleeding problem. Does not bruise/bleed easily.   Musculoskeletal: Negative for falls and muscle weakness.    Neurological: See HPI. No seizures.   Endocrine: Negative for polydipsia, polyphagia and polyuria.   Allergic/Immunologic: Negative for hives and persistent infections.  Psychiatric/Behavioral: Negative for depression and insomnia.         EXAM:  There were no vitals taken for this visit.    General: The patient is a very pleasant 61 y.o. female in no apparent distress, the patient is oriented to person, place and time.  Psych: Normal mood and affect  HEENT: Vision grossly intact, hearing intact to the spoken word.  Lungs: Respirations unlabored.  Gait: Normal station and gait, no difficulty with toe or heel walk.   Skin: Cervical skin negative for rashes, lesions, hairy patches and surgical scars.  Range of motion: Cervical range of motion is acceptable. There is negative tenderness to palpation.  Spinal Balance: Global saggital and coronal spinal balance acceptable, no significant for scoliosis and kyphosis.  Musculoskeletal: No pain with the range of motion of the bilateral shoulders and elbows. Normal bulk and contour of the bilateral hands.  Vascular: Bilateral hands warm and well perfused, radial pulses 2+ bilaterally.  Neurological: Normal strength and tone in all major motor groups in the bilateral upper and lower extremities. Normal sensation to light touch in the C5-T1 and L2-S1 dermatomes bilaterally.  Deep tendon reflexes symmetric 2+ in the bilateral upper and lower extremities.  Negative Inverted Radial Reflex and Clark's bilaterally. Negative Babinski bilaterally.     IMAGING:   Today I personally reviewed AP, Lat and Flex/Ex  upright C-spine films that demonstrate moderate degenerative changes.    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    There is no height or weight on file to calculate BMI.    Hemoglobin A1C   Date Value Ref  Range Status   01/08/2022 6.2 (H) 4.0 - 5.6 % Final     Comment:     ADA Screening Guidelines:  5.7-6.4%  Consistent with prediabetes  >or=6.5%  Consistent with diabetes    High levels of fetal hemoglobin interfere with the HbA1C  assay. Heterozygous hemoglobin variants (HbS, HgC, etc)do  not significantly interfere with this assay.   However, presence of multiple variants may affect accuracy.     01/09/2021 6.0 (H) 4.0 - 5.6 % Final     Comment:     ADA Screening Guidelines:  5.7-6.4%  Consistent with prediabetes  >or=6.5%  Consistent with diabetes  High levels of fetal hemoglobin interfere with the HbA1C  assay. Heterozygous hemoglobin variants (HbS, HgC, etc)do  not significantly interfere with this assay.   However, presence of multiple variants may affect accuracy.     04/02/2019 6.2 (H) 4.0 - 5.6 % Final     Comment:     ADA Screening Guidelines:  5.7-6.4%  Consistent with prediabetes  >or=6.5%  Consistent with diabetes  High levels of fetal hemoglobin interfere with the HbA1C  assay. Heterozygous hemoglobin variants (HbS, HgC, etc)do  not significantly interfere with this assay.   However, presence of multiple variants may affect accuracy.             ASSESSMENT/PLAN:    There are no diagnoses linked to this encounter.    Today we discussed at length all of the different treatment options including anti-inflammatories, acetaminophen, rest, ice, heat, physical therapy including strengthening and stretching exercises, home exercises, ROM, aerobic conditioning, aqua therapy, other modalities including ultrasound, massage, and dry needling, epidural steroid injections and finally surgical intervention.      Pt presents with chronic cervical radiculopathy. Failure of conservative rx. will order cervical JASON with pain management. Will send medrol dose pack to pharmacy. Pt will fu 2 weeks after injection.

## 2022-07-14 NOTE — PROGRESS NOTES
DATE: 7/14/2022  PATIENT: Buffy Morin    Supervising Physician: Cy Solis M.D.    CHIEF COMPLAINT: bilateral shoulder pain    HISTORY:  Buffy Morin is a 61 y.o. female here for initial evaluation of bilateral shoulder pain (7/10). The pain has been present for months. The patient describes the pain as aching. The pain is worse with nothing and improved by nothing and occurs constantly. There is negative associated numbness and tingling. There is negative subjective weakness. Prior treatments have included PT for 6 weeks with no relief, but no ESIs or surgery. Pt was seen by Rodrigo Sampson PA-C on 6/28/22 for similar complaints.     The patient denies myelopathic symptoms such as handwriting changes or difficulty with buttons/coins/keys. Denies perineal paresthesias, bowel/bladder dysfunction.    PAST MEDICAL/SURGICAL HISTORY:  Past Medical History:   Diagnosis Date    Abnormal Pap smear of cervix     Anxiety     At high risk for breast cancer: TC 7 > 20% 2019 2/6/2019    Carpal tunnel syndrome     Elevated blood pressure     Family history of breast cancer in sister 10/1/2013    Hypertension     S/P hysterectomy 9/23/2017     Past Surgical History:   Procedure Laterality Date    BREAST BIOPSY Right     2007    COLONOSCOPY N/A 8/19/2019    Procedure: COLONOSCOPY;  Surgeon: ROHIT Romero MD;  Location: [address];  Service: Endoscopy;  Laterality: N/A;    HYSTERECTOMY      Total    TRANSFORAMINAL EPIDURAL INJECTION OF STEROID Bilateral 12/23/2019    Procedure: LUMBAR TRANSFORAMINAL BILATERAL L4/5 TF JASON;  Surgeon: Hernesto Lazo MD;  Location: [facility];  Service: Pain Management;  Laterality: Bilateral;  NEEDS CONSENT    TRANSFORAMINAL EPIDURAL INJECTION OF STEROID Bilateral 1/16/2020    Procedure: LUMBAR TRANSFORAMINAL BILATERAL L4/5 TF JASON DIRECT REFERRAL;  Surgeon: Pamela Erazo MD;  Location: [facility];  Service: Pain Management;  Laterality: Bilateral;  NEEDS CONSENT     TUBAL LIGATION         Medications:  Current Outpatient Medications on File Prior to Visit   Medication Sig Dispense Refill    amLODIPine (NORVASC) 10 MG tablet Take 1 tablet (10 mg total) by mouth once daily. 90 tablet 3    atorvastatin (LIPITOR) 80 MG tablet Take 1 tablet (80 mg total) by mouth once daily. 90 tablet 1    cholecalciferol, vitamin D3, (VITAMIN D3) 50 mcg (2,000 unit) Cap Take 1 capsule by mouth Daily. Take vitamin D 2000 units every day.      diclofenac sodium (VOLTAREN) 1 % Gel Apply two grams to affected area 3-4 times as needed 1 each 3    EScitalopram oxalate (LEXAPRO) 10 MG tablet Take 1 tablet (10 mg total) by mouth once daily. 90 tablet 3    gabapentin (NEURONTIN) 100 MG capsule Take 1 capsule (100 mg total) by mouth 3 (three) times daily. 90 capsule 0    LORazepam (ATIVAN) 0.5 MG tablet TAKE 1 TABLET BY MOUTH DAILY AS NEEDED FOR ANXIETY (NO MORE THAN 2-3X WEEKLY) 30 tablet 0    meloxicam (MOBIC) 15 MG tablet TAKE 1 TABLET BY MOUTH EVERY DAY 30 tablet 1     No current facility-administered medications on file prior to visit.       Social History:   Social History     Socioeconomic History    Marital status:     Number of children: 2   Tobacco Use    Smoking status: Never Smoker    Smokeless tobacco: Never Used   Substance and Sexual Activity    Alcohol use: Yes     Comment: rare    Drug use: No    Sexual activity: Yes       REVIEW OF SYSTEMS:  Constitution: Negative. Negative for chills, fever and night sweats.   Cardiovascular: Negative for chest pain and syncope.   Respiratory: Negative for cough and shortness of breath.   Gastrointestinal: See HPI. Negative for nausea/vomiting. Negative for abdominal pain.  Genitourinary: See HPI. Negative for discoloration or dysuria.  Skin: Negative for dry skin, itching and rash.   Hematologic/Lymphatic: Negative for bleeding problem. Does not bruise/bleed easily.   Musculoskeletal: Negative for falls and muscle weakness.    Neurological: See HPI. No seizures.   Endocrine: Negative for polydipsia, polyphagia and polyuria.   Allergic/Immunologic: Negative for hives and persistent infections.  Psychiatric/Behavioral: Negative for depression and insomnia.         EXAM:  There were no vitals taken for this visit.    General: The patient is a very pleasant 61 y.o. female in no apparent distress, the patient is oriented to person, place and time.  Psych: Normal mood and affect  HEENT: Vision grossly intact, hearing intact to the spoken word.  Lungs: Respirations unlabored.  Gait: Normal station and gait, no difficulty with toe or heel walk.   Skin: Cervical skin negative for rashes, lesions, hairy patches and surgical scars.  Range of motion: Cervical range of motion is acceptable. There is negative tenderness to palpation.  Spinal Balance: Global saggital and coronal spinal balance acceptable, no significant for scoliosis and kyphosis.  Musculoskeletal: No pain with the range of motion of the bilateral shoulders and elbows. Normal bulk and contour of the bilateral hands.  Vascular: Bilateral hands warm and well perfused, radial pulses 2+ bilaterally.  Neurological: Normal strength and tone in all major motor groups in the bilateral upper and lower extremities. Normal sensation to light touch in the C5-T1 and L2-S1 dermatomes bilaterally.  Deep tendon reflexes symmetric 2+ in the bilateral upper and lower extremities.  Negative Inverted Radial Reflex and Clark's bilaterally. Negative Babinski bilaterally.     IMAGING:   Today I personally reviewed AP, Lat and Flex/Ex  upright C-spine films that demonstrate moderate degenerative changes.    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    There is no height or weight on file to calculate BMI.    Hemoglobin A1C   Date Value Ref  Range Status   01/08/2022 6.2 (H) 4.0 - 5.6 % Final     Comment:     ADA Screening Guidelines:  5.7-6.4%  Consistent with prediabetes  >or=6.5%  Consistent with diabetes    High levels of fetal hemoglobin interfere with the HbA1C  assay. Heterozygous hemoglobin variants (HbS, HgC, etc)do  not significantly interfere with this assay.   However, presence of multiple variants may affect accuracy.     01/09/2021 6.0 (H) 4.0 - 5.6 % Final     Comment:     ADA Screening Guidelines:  5.7-6.4%  Consistent with prediabetes  >or=6.5%  Consistent with diabetes  High levels of fetal hemoglobin interfere with the HbA1C  assay. Heterozygous hemoglobin variants (HbS, HgC, etc)do  not significantly interfere with this assay.   However, presence of multiple variants may affect accuracy.     04/02/2019 6.2 (H) 4.0 - 5.6 % Final     Comment:     ADA Screening Guidelines:  5.7-6.4%  Consistent with prediabetes  >or=6.5%  Consistent with diabetes  High levels of fetal hemoglobin interfere with the HbA1C  assay. Heterozygous hemoglobin variants (HbS, HgC, etc)do  not significantly interfere with this assay.   However, presence of multiple variants may affect accuracy.             ASSESSMENT/PLAN:    There are no diagnoses linked to this encounter.    Today we discussed at length all of the different treatment options including anti-inflammatories, acetaminophen, rest, ice, heat, physical therapy including strengthening and stretching exercises, home exercises, ROM, aerobic conditioning, aqua therapy, other modalities including ultrasound, massage, and dry needling, epidural steroid injections and finally surgical intervention.      Pt presents with chronic cervical radiculopathy. Failure of conservative rx. will order cervical JASON with pain management. Will send medrol dose pack to pharmacy. Pt will fu 2 weeks after injection.

## 2022-07-18 ENCOUNTER — TELEPHONE (OUTPATIENT)
Dept: PAIN MEDICINE | Facility: CLINIC | Age: 62
End: 2022-07-18
Payer: COMMERCIAL

## 2022-07-18 DIAGNOSIS — M54.12 CERVICAL RADICULOPATHY: Primary | ICD-10-CM

## 2022-07-18 NOTE — TELEPHONE ENCOUNTER
Patient has been scheduled for procedure on 8/3/22. Time of arrival 740 am.  Denies blood thinners  Denies pacemaker/ICD      · Check in at the registration desk on the first floor of the hospital. [address] [facility]. [place] [address]  · Please DO NOT eat or drink 8 hours prior to your arrival time for the procedure, if diabetic 6 hours prior. If you are taking medications for blood pressure, heart medications, thyroid, cholesterol; you can take them with a small sip of water.  · Refrain from drinking alcohol within 24 hours prior to your procedure  · You will need someone to drive you home after procedure. You cannot take taxi, Uber, or Lyft.  · If you start feeling sick (fever, chills, or coughing) or start on any antibiotics please contact us at [phone] to reschedule.       Pt was informed to hold mobic 4 days prior to her procedure per Dr. Hollingsworth.    Pt voiced understanding and confirmed procedure date and time.

## 2022-08-02 ENCOUNTER — TELEPHONE (OUTPATIENT)
Dept: PAIN MEDICINE | Facility: CLINIC | Age: 62
End: 2022-08-02
Payer: COMMERCIAL

## 2022-08-02 NOTE — DISCHARGE INSTRUCTIONS
Home Care Instructions Pain Management:    1.  DIET:    You may resume your normal diet today.    2.  BATHING:    You may shower with luke warm water.    3.  DRESSING:    You may remove your bandage today.    4.  ACTIVITY LEVEL:      You may resume your normal activities 24 hours after your procedure.    5.  MEDICATIONS:    You may resume your normal medications today.    6.  SPECIAL INSTRUCTIONS:    No heat to the injection site for 24 hours including bath or shower, heating pad, moist heat or hot tubs.    Use an ice pack to the injection site for any pain or discomfort.  Apply ice packs for 20 minute intervals as needed.    If you have received any sedatives by mouth today, you can not drive for 12 hours.    If you have received sedation through an IV, you can not drive for 24 hours.    PLEASE CALL YOUR DOCTOR FOR THE FOLLOWIN.  Redness or swelling around the injection site.  2.  Fever of 101 degrees.  3.  Drainage (pus) from the injection site.  4.  For any continuous bleeding (some dried blood over the incision is normal.)    FOR EMERGENCIES:    If any unusual problems or difficulties occur during clinic hours, call [phone] or dial [phone].    Follow up with with your physician in 2-3 weeks.

## 2022-08-02 NOTE — TELEPHONE ENCOUNTER
Spoke to patient, and she confirmed her procedure tomorrow (8/3) at 8:40am. I reminded her to come in 1 hour earlier than her scheduled appointment time.

## 2022-08-03 ENCOUNTER — HOSPITAL ENCOUNTER (OUTPATIENT)
Facility: HOSPITAL | Age: 62
Discharge: HOME | End: 2022-08-03
Attending: PHYSICAL MEDICINE & REHABILITATION | Admitting: PHYSICAL MEDICINE & REHABILITATION
Payer: COMMERCIAL

## 2022-08-03 VITALS
DIASTOLIC BLOOD PRESSURE: 62 MMHG | TEMPERATURE: 98 F | OXYGEN SATURATION: 100 % | WEIGHT: 171 LBS | RESPIRATION RATE: 17 BRPM | SYSTOLIC BLOOD PRESSURE: 120 MMHG | BODY MASS INDEX: 30.3 KG/M2 | HEIGHT: 63 IN | HEART RATE: 71 BPM

## 2022-08-03 DIAGNOSIS — R52 PAIN: ICD-10-CM

## 2022-08-03 DIAGNOSIS — M54.12 CERVICAL RADICULOPATHY: Primary | ICD-10-CM

## 2022-08-03 PROCEDURE — 62321 NJX INTERLAMINAR CRV/THRC: CPT | Mod: ,,, | Performed by: PHYSICAL MEDICINE & REHABILITATION

## 2022-08-03 PROCEDURE — 62321 PR INJ CERV/THORAC, W/GUIDANCE: ICD-10-PCS | Mod: ,,, | Performed by: PHYSICAL MEDICINE & REHABILITATION

## 2022-08-03 PROCEDURE — 25500020 PHARM REV CODE 255: Performed by: PHYSICAL MEDICINE & REHABILITATION

## 2022-08-03 PROCEDURE — 63600175 PHARM REV CODE 636 W HCPCS: Performed by: PHYSICAL MEDICINE & REHABILITATION

## 2022-08-03 PROCEDURE — 25000003 PHARM REV CODE 250: Performed by: PHYSICAL MEDICINE & REHABILITATION

## 2022-08-03 PROCEDURE — 62321 NJX INTERLAMINAR CRV/THRC: CPT | Performed by: PHYSICAL MEDICINE & REHABILITATION

## 2022-08-03 RX ORDER — DEXAMETHASONE SODIUM PHOSPHATE 10 MG/ML
INJECTION INTRAMUSCULAR; INTRAVENOUS
Status: DISCONTINUED | OUTPATIENT
Start: 2022-08-03 | End: 2022-08-03 | Stop reason: HOSPADM

## 2022-08-03 RX ORDER — LIDOCAINE HYDROCHLORIDE 10 MG/ML
INJECTION INFILTRATION; PERINEURAL
Status: DISCONTINUED | OUTPATIENT
Start: 2022-08-03 | End: 2022-08-03 | Stop reason: HOSPADM

## 2022-08-03 RX ORDER — INDOMETHACIN 25 MG/1
CAPSULE ORAL
Status: DISCONTINUED | OUTPATIENT
Start: 2022-08-03 | End: 2022-08-03 | Stop reason: HOSPADM

## 2022-08-03 RX ORDER — SODIUM CHLORIDE 9 MG/ML
INJECTION, SOLUTION INTRAMUSCULAR; INTRAVENOUS; SUBCUTANEOUS
Status: DISCONTINUED | OUTPATIENT
Start: 2022-08-03 | End: 2022-08-03 | Stop reason: HOSPADM

## 2022-08-03 NOTE — DISCHARGE SUMMARY
[facility]  Discharge Note  Short Stay     Admit Date: 8/3/2022    Discharge Date: 8/3/2022     Attending Physician: Justina Hollingsworth M.D.    Diagnoses:  Active Hospital Problems    Diagnosis  POA    *Cervical radiculopathy [M54.12]  Yes      Resolved Hospital Problems   No resolved problems to display.     Discharged Condition: Good     Hospital Course: Patient was admitted for an outpatient interventional pain management procedure and tolerated the procedure well with no complications.     Final Diagnoses: Same as principal problem.     Disposition: Home or Self Care     Follow up/Patient Instructions:   Follow-up in 1-2 weeks unless otherwise instructed. May return sooner as needed.       Reconciled Medications:     Medication List      CONTINUE taking these medications    amLODIPine 10 MG tablet  Commonly known as: NORVASC  Take 1 tablet (10 mg total) by mouth once daily.     atorvastatin 80 MG tablet  Commonly known as: LIPITOR  Take 1 tablet (80 mg total) by mouth once daily.     cholecalciferol (vitamin D3) 50 mcg (2,000 unit) Cap capsule  Commonly known as: VITAMIN D3  Take 1 capsule by mouth Daily. Take vitamin D 2000 units every day.     diclofenac sodium 1 % Gel  Commonly known as: VOLTAREN  Apply two grams to affected area 3-4 times as needed     EScitalopram oxalate 10 MG tablet  Commonly known as: LEXAPRO  Take 1 tablet (10 mg total) by mouth once daily.     gabapentin 100 MG capsule  Commonly known as: NEURONTIN  Take 1 capsule (100 mg total) by mouth 3 (three) times daily.     LORazepam 0.5 MG tablet  Commonly known as: ATIVAN  TAKE 1 TABLET BY MOUTH DAILY AS NEEDED FOR ANXIETY (NO MORE THAN 2-3X WEEKLY)     meloxicam 15 MG tablet  Commonly known as: MOBIC  TAKE 1 TABLET BY MOUTH EVERY DAY     methylPREDNISolone 4 mg tablet  Commonly known as: MEDROL DOSEPACK  use as directed           Discharge Procedure Orders (must include Diet, Follow-up, Activity)   Ice to affected area   Order Comments:  20 minutes of ice or until area numb to the touch if area is sore 2-3 times per day as needed     No driving until:   Order Comments: Until following day     No dressing needed     Notify your health care provider if you experience any of the following:  temperature >100.4     Notify your health care provider if you experience any of the following:  persistent nausea and vomiting or diarrhea     Notify your health care provider if you experience any of the following:  severe uncontrolled pain     Notify your health care provider if you experience any of the following:  redness, tenderness, or signs of infection (pain, swelling, redness, odor or green/yellow discharge around incision site)     Notify your health care provider if you experience any of the following:  difficulty breathing or increased cough     Notify your health care provider if you experience any of the following:  severe persistent headache     Notify your health care provider if you experience any of the following:  worsening rash     Notify your health care provider if you experience any of the following:  persistent dizziness, light-headedness, or visual disturbances     Notify your health care provider if you experience any of the following:  increased confusion or weakness     Shower on day dressing removed (No bath)       Justina Hollingsworth M.D.  Interventional Pain Medicine / Physical Medicine & Rehabilitation

## 2022-08-03 NOTE — INTERVAL H&P NOTE
The patient has been examined and the H&P has been reviewed:    I concur with the findings and no changes have occurred since H&P was written.    Procedure risks, benefits and alternative options discussed and understood by patient/family.          Active Hospital Problems    Diagnosis  POA    *Cervical radiculopathy [M54.12]  Yes      Resolved Hospital Problems   No resolved problems to display.

## 2022-08-03 NOTE — OP NOTE
Cervical Interlaminar Epidural Steroid Injection Under Fluoroscopic Guidance:  I have reviewed the patient's medications, allergies and relevant histories prior to the procedure and no contraindications have been identified. The risks, benefits and alternatives to the procedure were discussed with the patient, and all questions regarding the procedure were answered to the patient's satisfaction. I personally obtained [place]'s consent prior to the start of the procedure and the signed consent can be found in the patient's chart.                                                         Time-out was taken to identify patient, procedure, laterality, and allergies prior to starting the procedure.       Date of Service: 08/03/2022  Procedure: C7-T1 cervical interlaminar epidural steroid injection under fluoroscopy.  Pre-Operative Diagnosis: Cervical Radiculopathy  Post-Operative Diagnosis: Cervical Radiculopathy    Physician: Justina Hollingsworth M.D.  Assistants: Portia Black M.D.      Medications Injected: Preservative-free dexamethasone 10 mg/mL and 2 mL of Normal Saline   Local Anesthetic: Xylocaine 1% 10 mL.   Sedation Medications: None    Procedural Technique:  With the patient laying in a prone position with the neck in a slightly forward flexed position, the area was prepped and draped in the usual sterile fashion using ChloraPrep and a fenestrated drape.  The area was determined under fluoroscopic guidance.  Local anesthetic was utilized to anesthetize the skin and subcutaneous tissues in the area using a 25-gauge needle.  A 3.5 inch 20-gauge Touhy needle was introduced under fluoroscopic guidance to meet the lamina of T1.  The needle was then directed superior-medially and advanced using loss of resistance technique.  Once the tip of the needle was in the desired position, the contrast dye, Omnipaque, was injected to determine epidural placement and no vascular runoff with live fluoroscopy to visualize. The  medications were then injected slowly. The needle was removed and bandage applied.     Estimated Blood Loss:  None.  Complications:  None.     Disposition: The patient tolerated the procedure well, and there were no apparent complications. Vital signs remained stable throughout the procedure. The patient was taken to the recovery area and monitored after the procedure. The patient was supplied with written discharge instructions for the procedure. If helpful, we can repeat as needed. The patient was discharged in a stable condition.    Follow-Up: We will see the patient back in 1-2 weeks or the patient may call to inform of status.

## 2022-08-03 NOTE — PLAN OF CARE
Reviewed DC instructions with pt. All questions answered. No other needs or concerns at this time. Pt. Verbalized understanding of DC instructions and when to follow up with MD with questions and concerns.

## 2022-08-22 ENCOUNTER — HOSPITAL ENCOUNTER (OUTPATIENT)
Dept: RADIOLOGY | Facility: HOSPITAL | Age: 62
Discharge: HOME | End: 2022-08-22
Attending: INTERNAL MEDICINE
Payer: COMMERCIAL

## 2022-08-22 DIAGNOSIS — Z12.31 SCREENING MAMMOGRAM, ENCOUNTER FOR: ICD-10-CM

## 2022-08-22 PROCEDURE — 77063 BREAST TOMOSYNTHESIS BI: CPT | Mod: 26,,, | Performed by: RADIOLOGY

## 2022-08-22 PROCEDURE — 77067 SCR MAMMO BI INCL CAD: CPT | Mod: 26,,, | Performed by: RADIOLOGY

## 2022-08-22 PROCEDURE — 77067 MAMMO DIGITAL SCREENING BILAT WITH TOMO: ICD-10-PCS | Mod: 26,,, | Performed by: RADIOLOGY

## 2022-08-22 PROCEDURE — 77067 SCR MAMMO BI INCL CAD: CPT | Mod: TC

## 2022-08-22 PROCEDURE — 77063 MAMMO DIGITAL SCREENING BILAT WITH TOMO: ICD-10-PCS | Mod: 26,,, | Performed by: RADIOLOGY

## 2022-08-22 PROCEDURE — 77063 BREAST TOMOSYNTHESIS BI: CPT | Mod: TC

## 2022-10-14 ENCOUNTER — TELEPHONE (OUTPATIENT)
Dept: INTERNAL MEDICINE | Facility: CLINIC | Age: 62
End: 2022-10-14
Payer: COMMERCIAL

## 2022-10-24 ENCOUNTER — LAB VISIT (OUTPATIENT)
Dept: LAB | Facility: HOSPITAL | Age: 62
End: 2022-10-24
Attending: INTERNAL MEDICINE
Payer: COMMERCIAL

## 2022-10-24 DIAGNOSIS — E78.2 MIXED HYPERLIPIDEMIA: ICD-10-CM

## 2022-10-24 LAB
AST SERPL-CCNC: 20 U/L (ref 10–40)
CHOLEST SERPL-MCNC: 140 MG/DL (ref 120–199)
CHOLEST/HDLC SERPL: 2.4 {RATIO} (ref 2–5)
HDLC SERPL-MCNC: 59 MG/DL (ref 40–75)
HDLC SERPL: 42.1 % (ref 20–50)
LDLC SERPL CALC-MCNC: 66.2 MG/DL (ref 63–159)
NONHDLC SERPL-MCNC: 81 MG/DL
TRIGL SERPL-MCNC: 74 MG/DL (ref 30–150)

## 2022-10-24 PROCEDURE — 84450 TRANSFERASE (AST) (SGOT): CPT | Performed by: INTERNAL MEDICINE

## 2022-10-24 PROCEDURE — 36415 COLL VENOUS BLD VENIPUNCTURE: CPT | Performed by: INTERNAL MEDICINE

## 2022-10-24 PROCEDURE — 80061 LIPID PANEL: CPT | Performed by: INTERNAL MEDICINE

## 2022-11-09 ENCOUNTER — TELEPHONE (OUTPATIENT)
Dept: INTERNAL MEDICINE | Facility: CLINIC | Age: 62
End: 2022-11-09
Payer: COMMERCIAL

## 2022-11-09 DIAGNOSIS — Z00.00 ANNUAL PHYSICAL EXAM: Primary | ICD-10-CM

## 2022-11-09 NOTE — TELEPHONE ENCOUNTER
She has not read Susan message    OK to call or send her a letter letting her know that her labs are stable and she should stay on her same regimen.  She needs to see me in April for her annual exam with usual labs prior, thank you

## 2022-11-15 ENCOUNTER — NURSE TRIAGE (OUTPATIENT)
Dept: ADMINISTRATIVE | Facility: CLINIC | Age: 62
End: 2022-11-15
Payer: COMMERCIAL

## 2022-11-15 ENCOUNTER — TELEPHONE (OUTPATIENT)
Dept: INTERNAL MEDICINE | Facility: CLINIC | Age: 62
End: 2022-11-15

## 2022-11-15 ENCOUNTER — TELEPHONE (OUTPATIENT)
Dept: INTERNAL MEDICINE | Facility: CLINIC | Age: 62
End: 2022-11-15
Payer: COMMERCIAL

## 2022-11-15 ENCOUNTER — OFFICE VISIT (OUTPATIENT)
Dept: URGENT CARE | Facility: CLINIC | Age: 62
End: 2022-11-15
Payer: COMMERCIAL

## 2022-11-15 VITALS
OXYGEN SATURATION: 98 % | TEMPERATURE: 98 F | WEIGHT: 171 LBS | RESPIRATION RATE: 20 BRPM | SYSTOLIC BLOOD PRESSURE: 154 MMHG | BODY MASS INDEX: 30.3 KG/M2 | HEART RATE: 86 BPM | DIASTOLIC BLOOD PRESSURE: 84 MMHG | HEIGHT: 63 IN

## 2022-11-15 DIAGNOSIS — F41.9 ANXIETY: Primary | ICD-10-CM

## 2022-11-15 PROCEDURE — 3008F BODY MASS INDEX DOCD: CPT | Mod: CPTII,S$GLB,, | Performed by: FAMILY MEDICINE

## 2022-11-15 PROCEDURE — 93010 ELECTROCARDIOGRAM REPORT: CPT | Mod: S$GLB,,, | Performed by: INTERNAL MEDICINE

## 2022-11-15 PROCEDURE — 93005 ELECTROCARDIOGRAM TRACING: CPT | Mod: S$GLB,,, | Performed by: FAMILY MEDICINE

## 2022-11-15 PROCEDURE — 93005 EKG 12-LEAD: ICD-10-PCS | Mod: S$GLB,,, | Performed by: FAMILY MEDICINE

## 2022-11-15 PROCEDURE — 3079F DIAST BP 80-89 MM HG: CPT | Mod: CPTII,S$GLB,, | Performed by: FAMILY MEDICINE

## 2022-11-15 PROCEDURE — 99213 OFFICE O/P EST LOW 20 MIN: CPT | Mod: S$GLB,,, | Performed by: FAMILY MEDICINE

## 2022-11-15 PROCEDURE — 93010 EKG 12-LEAD: ICD-10-PCS | Mod: S$GLB,,, | Performed by: INTERNAL MEDICINE

## 2022-11-15 PROCEDURE — 3077F PR MOST RECENT SYSTOLIC BLOOD PRESSURE >= 140 MM HG: ICD-10-PCS | Mod: CPTII,S$GLB,, | Performed by: FAMILY MEDICINE

## 2022-11-15 PROCEDURE — 3044F HG A1C LEVEL LT 7.0%: CPT | Mod: CPTII,S$GLB,, | Performed by: FAMILY MEDICINE

## 2022-11-15 PROCEDURE — 3008F PR BODY MASS INDEX (BMI) DOCUMENTED: ICD-10-PCS | Mod: CPTII,S$GLB,, | Performed by: FAMILY MEDICINE

## 2022-11-15 PROCEDURE — 3077F SYST BP >= 140 MM HG: CPT | Mod: CPTII,S$GLB,, | Performed by: FAMILY MEDICINE

## 2022-11-15 PROCEDURE — 99213 PR OFFICE/OUTPT VISIT, EST, LEVL III, 20-29 MIN: ICD-10-PCS | Mod: S$GLB,,, | Performed by: FAMILY MEDICINE

## 2022-11-15 PROCEDURE — 3044F PR MOST RECENT HEMOGLOBIN A1C LEVEL <7.0%: ICD-10-PCS | Mod: CPTII,S$GLB,, | Performed by: FAMILY MEDICINE

## 2022-11-15 PROCEDURE — 3079F PR MOST RECENT DIASTOLIC BLOOD PRESSURE 80-89 MM HG: ICD-10-PCS | Mod: CPTII,S$GLB,, | Performed by: FAMILY MEDICINE

## 2022-11-15 RX ORDER — LORAZEPAM 0.5 MG/1
0.5 TABLET ORAL EVERY 8 HOURS PRN
Qty: 30 TABLET | Refills: 0 | Status: SHIPPED | OUTPATIENT
Start: 2022-11-15 | End: 2023-09-20

## 2022-11-15 NOTE — TELEPHONE ENCOUNTER
I called the patient to speak with her. She is at Urgent care now. We both agreed to cancel the appt she had with us today. Thank you Dr. Carrasquillo.

## 2022-11-15 NOTE — TELEPHONE ENCOUNTER
"Spoke to patient stated she has been having episode of depression and anxiety.   Stated she has been taking lorazepam 0.5mg and lexapro 10mg but it makes her feel "off ."     Stated she has been having episodes of her body shaking.      Bp last night 142/82   Pulse 112    Offered appt for Monday stated she cant wait that long.     Please advise   "

## 2022-11-15 NOTE — TELEPHONE ENCOUNTER
Pt called OOC line and stated that she is on medication for anxiety and depression as needed. Pt received a call from PCP's office and stated would call back.  No triage.    Pt called OOC line back and stated she took /82 and .  Pt states she feels like she is shaking inside, SOB, not calm and jittery.  Pt states she has been feeling like this for x4 days.  Pt has appt for today at 1800 with Dr. Acevedo at [facility].    Pt was advised to call 911 per triage protocol.  Pt verbalized understanding.    Reason for Disposition   Caller hangs up    Additional Information   Negative: Caller is angry or rude (e.g., hangs up, verbally abusive, yelling)   Caller hangs up   Negative: Violent behavior, or threatening to physically hurt or kill someone   Negative: [1] Caller is very confused AND [2] no other adult (e.g., friend or family member) available   Negative: Sounds like a life-threatening emergency to the triager   Negative: Child abuse suspected   Negative: Suicide thoughts, threats, attempts, or questions   Negative: [1] [place] worried caller AND [2] second call within 4 hours about the same medical problem   Negative: [1] [place] worried caller AND [2] third call within 48 hours about the same medical problem   Negative: [1] [place] worried caller AND [2] can't be reassured by triager   Negative: Patient sounds very sick or weak to the triager   Negative: [1] Caller demands to speak with the PCP AND [2] about sick adult (or sick caller)   Negative: [1] Angry or rude caller AND [2] doesn't respond to 5 minutes of triager counseling AND [3] sick adult (or caller)   Negative: Difficult caller responded to triager counseling   Negative: [1] Caller mainly has complaints about past medical care, billing, etc. AND [2] has mild symptoms or is well   Negative: Caller is requesting health information that triager feels is unethical or illegal   Negative: [1] Caller continues to be verbally abusive AND [2] after  triager sets BOUNDARIES AND [3] Triager ends call    Protocols used: No Contact or Duplicate Contact Call-A-AH, Difficult Call-A-AH

## 2022-11-15 NOTE — TELEPHONE ENCOUNTER
----- Message from Kendall Brennan sent at 11/15/2022 10:05 AM CST -----  Contact: Patient  The pt called and would like to have a nurse call her back ASAP    She can be reached at [phone]

## 2022-11-15 NOTE — TELEPHONE ENCOUNTER
Reason for Disposition   SEVERE difficulty breathing (e.g., struggling for each breath, speaks in single words)    Protocols used: Anxiety and Panic Attack-A-OH

## 2022-11-15 NOTE — TELEPHONE ENCOUNTER
If she can not wait till 6:00 p.m. today, I would recommend she go to the emergency room, I see she has an appointment today

## 2022-11-15 NOTE — PROGRESS NOTES
"Subjective:       Patient ID: Buffy Morin is a 62 y.o. female.    Vitals:  height is 5' 3" (1.6 m) and weight is 77.6 kg (171 lb). Her temperature is 98.4 °F (36.9 °C). Her blood pressure is 154/84 (abnormal) and her pulse is 86. Her respiration is 20 and oxygen saturation is 98%.     Chief Complaint: Anxiety    62 yr Female complaining of Anxiety feeling since Saturday Morning says she went to work and couldn't stay because she felt it coming and felt SOB and felt overwhelmed. When she went back to work Monday she felt okay  and not as bad. Today at work she had that feeling again. Patient does take Depression med's as needed. Covid Vaccined. Never had Covid.  Feels overwhelmed, overworked, feels heart racing feeling, denies chest pain or SOB, has been on lexapro 10  And ativan , ran out of ativan      Anxiety      ROS    Objective:      Physical Exam   Constitutional: She is oriented to person, place, and time. She appears well-developed. She is cooperative.  Non-toxic appearance. She does not appear ill. No distress.   HENT:   Head: Normocephalic and atraumatic.   Ears:   Right Ear: Hearing, tympanic membrane, external ear and ear canal normal.   Left Ear: Hearing, tympanic membrane, external ear and ear canal normal.   Nose: Nose normal. No mucosal edema, rhinorrhea or nasal deformity. No epistaxis. Right sinus exhibits no maxillary sinus tenderness and no frontal sinus tenderness. Left sinus exhibits no maxillary sinus tenderness and no frontal sinus tenderness.   Mouth/Throat: Uvula is midline, oropharynx is clear and moist and mucous membranes are normal. Mucous membranes are moist. No trismus in the jaw. Normal dentition. No uvula swelling. No posterior oropharyngeal erythema. Oropharynx is clear.   Eyes: Conjunctivae and lids are normal. Pupils are equal, round, and reactive to light. Right eye exhibits no discharge. Left eye exhibits no discharge. No scleral icterus. Extraocular movement intact   Neck: " Trachea normal and phonation normal. Neck supple.   Cardiovascular: Normal rate, regular rhythm, normal heart sounds and normal pulses.   Pulmonary/Chest: Effort normal and breath sounds normal. No respiratory distress.   Abdominal: Normal appearance and bowel sounds are normal. She exhibits no distension and no mass. Soft. There is no abdominal tenderness.   Musculoskeletal: Normal range of motion.         General: No deformity. Normal range of motion.   Neurological: She is alert and oriented to person, place, and time. She exhibits normal muscle tone. Coordination normal.   Skin: Skin is warm, dry, intact, not diaphoretic and not pale.   Psychiatric: Her speech is normal and behavior is normal. Judgment and thought content normal.   Nursing note and vitals reviewed.      Assessment:       1. Anxiety          Plan:         Anxiety  -     IN OFFICE EKG 12-LEAD (to [place])    Other orders  -     LORazepam (ATIVAN) 0.5 MG tablet; Take 1 tablet (0.5 mg total) by mouth every 8 (eight) hours as needed for Anxiety.  Dispense: 30 tablet; Refill: 0              Reassurance

## 2022-11-15 NOTE — LETTER
November 15, 2022      [facility]  [address] [address]  Phone: [phone]  Fax: [phone]       Patient: Buffy Morin   YOB: 1960  Date of Visit: 11/15/2022    To Whom It May Concern:    Lokesh Morin  was at [facility] on 11/15/2022. The patient may return to work/school on 11/17/22   with no restrictions. If you have any questions or concerns, or if I can be of further assistance, please do not hesitate to contact me.    Sincerely,    Kiarra Lujan MD

## 2022-11-21 ENCOUNTER — IMMUNIZATION (OUTPATIENT)
Dept: INTERNAL MEDICINE | Facility: CLINIC | Age: 62
End: 2022-11-21
Payer: COMMERCIAL

## 2022-11-21 ENCOUNTER — OFFICE VISIT (OUTPATIENT)
Dept: INTERNAL MEDICINE | Facility: CLINIC | Age: 62
End: 2022-11-21
Payer: COMMERCIAL

## 2022-11-21 ENCOUNTER — LAB VISIT (OUTPATIENT)
Dept: LAB | Facility: HOSPITAL | Age: 62
End: 2022-11-21
Attending: INTERNAL MEDICINE
Payer: COMMERCIAL

## 2022-11-21 VITALS
WEIGHT: 172 LBS | SYSTOLIC BLOOD PRESSURE: 130 MMHG | DIASTOLIC BLOOD PRESSURE: 80 MMHG | HEIGHT: 63 IN | BODY MASS INDEX: 30.48 KG/M2

## 2022-11-21 DIAGNOSIS — E66.09 CLASS 1 OBESITY DUE TO EXCESS CALORIES WITHOUT SERIOUS COMORBIDITY WITH BODY MASS INDEX (BMI) OF 30.0 TO 30.9 IN ADULT: ICD-10-CM

## 2022-11-21 DIAGNOSIS — I10 ESSENTIAL HYPERTENSION: ICD-10-CM

## 2022-11-21 DIAGNOSIS — R73.01 IFG (IMPAIRED FASTING GLUCOSE): ICD-10-CM

## 2022-11-21 DIAGNOSIS — Z23 NEED FOR VACCINATION: Primary | ICD-10-CM

## 2022-11-21 DIAGNOSIS — E78.2 MIXED HYPERLIPIDEMIA: ICD-10-CM

## 2022-11-21 DIAGNOSIS — E55.9 MILD VITAMIN D DEFICIENCY: ICD-10-CM

## 2022-11-21 DIAGNOSIS — F41.9 ANXIETY: Primary | ICD-10-CM

## 2022-11-21 DIAGNOSIS — Z80.3 FAMILY HISTORY OF BREAST CANCER IN SISTER: ICD-10-CM

## 2022-11-21 DIAGNOSIS — F41.9 ANXIETY: ICD-10-CM

## 2022-11-21 PROBLEM — E66.3 OVERWEIGHT (BMI 25.0-29.9): Status: RESOLVED | Noted: 2022-04-13 | Resolved: 2022-11-21

## 2022-11-21 PROBLEM — G89.29 CHRONIC PAIN: Status: RESOLVED | Noted: 2019-12-23 | Resolved: 2022-11-21

## 2022-11-21 LAB — TSH SERPL DL<=0.005 MIU/L-ACNC: 1.14 UIU/ML (ref 0.4–4)

## 2022-11-21 PROCEDURE — 3044F PR MOST RECENT HEMOGLOBIN A1C LEVEL <7.0%: ICD-10-PCS | Mod: CPTII,S$GLB,, | Performed by: INTERNAL MEDICINE

## 2022-11-21 PROCEDURE — 3008F PR BODY MASS INDEX (BMI) DOCUMENTED: ICD-10-PCS | Mod: CPTII,S$GLB,, | Performed by: INTERNAL MEDICINE

## 2022-11-21 PROCEDURE — 3075F PR MOST RECENT SYSTOLIC BLOOD PRESS GE 130-139MM HG: ICD-10-PCS | Mod: CPTII,S$GLB,, | Performed by: INTERNAL MEDICINE

## 2022-11-21 PROCEDURE — 3075F SYST BP GE 130 - 139MM HG: CPT | Mod: CPTII,S$GLB,, | Performed by: INTERNAL MEDICINE

## 2022-11-21 PROCEDURE — 1159F PR MEDICATION LIST DOCUMENTED IN MEDICAL RECORD: ICD-10-PCS | Mod: CPTII,S$GLB,, | Performed by: INTERNAL MEDICINE

## 2022-11-21 PROCEDURE — 99214 PR OFFICE/OUTPT VISIT, EST, LEVL IV, 30-39 MIN: ICD-10-PCS | Mod: S$GLB,,, | Performed by: INTERNAL MEDICINE

## 2022-11-21 PROCEDURE — 36415 COLL VENOUS BLD VENIPUNCTURE: CPT | Performed by: INTERNAL MEDICINE

## 2022-11-21 PROCEDURE — 1160F RVW MEDS BY RX/DR IN RCRD: CPT | Mod: CPTII,S$GLB,, | Performed by: INTERNAL MEDICINE

## 2022-11-21 PROCEDURE — 3079F DIAST BP 80-89 MM HG: CPT | Mod: CPTII,S$GLB,, | Performed by: INTERNAL MEDICINE

## 2022-11-21 PROCEDURE — 99214 OFFICE O/P EST MOD 30 MIN: CPT | Mod: S$GLB,,, | Performed by: INTERNAL MEDICINE

## 2022-11-21 PROCEDURE — 1159F MED LIST DOCD IN RCRD: CPT | Mod: CPTII,S$GLB,, | Performed by: INTERNAL MEDICINE

## 2022-11-21 PROCEDURE — 3079F PR MOST RECENT DIASTOLIC BLOOD PRESSURE 80-89 MM HG: ICD-10-PCS | Mod: CPTII,S$GLB,, | Performed by: INTERNAL MEDICINE

## 2022-11-21 PROCEDURE — 91312 COVID-19, MRNA, LNP-S, BIVALENT BOOSTER, PF, 30 MCG/0.3 ML DOSE: ICD-10-PCS | Mod: S$GLB,,, | Performed by: INTERNAL MEDICINE

## 2022-11-21 PROCEDURE — 1160F PR REVIEW ALL MEDS BY PRESCRIBER/CLIN PHARMACIST DOCUMENTED: ICD-10-PCS | Mod: CPTII,S$GLB,, | Performed by: INTERNAL MEDICINE

## 2022-11-21 PROCEDURE — 91312 COVID-19, MRNA, LNP-S, BIVALENT BOOSTER, PF, 30 MCG/0.3 ML DOSE: CPT | Mod: S$GLB,,, | Performed by: INTERNAL MEDICINE

## 2022-11-21 PROCEDURE — 0124A COVID-19, MRNA, LNP-S, BIVALENT BOOSTER, PF, 30 MCG/0.3 ML DOSE: CPT | Mod: CV19,PBBFAC | Performed by: INTERNAL MEDICINE

## 2022-11-21 PROCEDURE — 84443 ASSAY THYROID STIM HORMONE: CPT | Performed by: INTERNAL MEDICINE

## 2022-11-21 PROCEDURE — 99999 PR PBB SHADOW E&M-EST. PATIENT-LVL IV: ICD-10-PCS | Mod: PBBFAC,,, | Performed by: INTERNAL MEDICINE

## 2022-11-21 PROCEDURE — 3008F BODY MASS INDEX DOCD: CPT | Mod: CPTII,S$GLB,, | Performed by: INTERNAL MEDICINE

## 2022-11-21 PROCEDURE — 3044F HG A1C LEVEL LT 7.0%: CPT | Mod: CPTII,S$GLB,, | Performed by: INTERNAL MEDICINE

## 2022-11-21 PROCEDURE — 99999 PR PBB SHADOW E&M-EST. PATIENT-LVL IV: CPT | Mod: PBBFAC,,, | Performed by: INTERNAL MEDICINE

## 2022-11-21 RX ORDER — ESCITALOPRAM OXALATE 20 MG/1
20 TABLET ORAL DAILY
Qty: 90 TABLET | Refills: 1 | Status: SHIPPED | OUTPATIENT
Start: 2022-11-21 | End: 2023-07-06

## 2022-11-21 NOTE — LETTER
November 21, 2022    Buffy Morin  [address]  [place] [address]         [facility]  [address]  [place] [address]  Phone: [phone]  Fax: [phone] November 21, 2022     Patient: Buffy Morin   YOB: 1960   Date of Visit: 11/21/2022       To Whom It May Concern:    It is my medical opinion that Buffy Morin needs to be out of work Tuesday November 22 and Wednesday November 23 due to medical illness.  She has been under my care. She is able to return to work Monday November 28.    If you have any questions or concerns, please don't hesitate to contact my office.    Sincerely,        Minoo Carrasquillo MD

## 2022-11-21 NOTE — PROGRESS NOTES
Patient ID: Buffy Morin is a 62 y.o. female.    Chief Complaint: Follow-up      Assessment:       1. Anxiety    2. IFG (impaired fasting glucose)    3. Mild vitamin D deficiency    4. Family history of breast cancer in sister    5. Essential hypertension    6. Mixed hyperlipidemia    7. Class 1 obesity due to excess calories without serious comorbidity with body mass index (BMI) of 30.0 to 30.9 in adult          Plan:         1. Anxiety  -     TSH; Future; Expected date: 11/21/2022    2. IFG (impaired fasting glucose)    3. Mild vitamin D deficiency    4. Family history of breast cancer in sister    5. Essential hypertension    6. Mixed hyperlipidemia    7. Class 1 obesity due to excess calories without serious comorbidity with body mass index (BMI) of 30.0 to 30.9 in adult    Other orders  -     EScitalopram oxalate (LEXAPRO) 20 MG tablet; Take 1 tablet (20 mg total) by mouth once daily.  Dispense: 90 tablet; Refill: 1       Exercise, sleep hygiene, meditation, stress reduction techniques reviewed    She is able to contract for safety    TSH today and review    COVID booster    One-month follow-up, return sooner with problems in the interim    Advised to use her Ativan sparingly, she is doing so    Subjective:   Follow up     Significant situational stress and anxiety, lots of stress at work, working 12 hour days.  Seen recently in urgent care and given Ativan 3 times daily prn.  She has been taking it once daily.  Reviewed.  EKG done in office acceptable.     BP doing well     BMI 30    The 10-year ASCVD risk score (Veronica CH, et al., 2019) is: 6.4%- better on Lipitor 80 mg.    Values used to calculate the score:      Age: 62 years      Sex: Female      Is Non- : Yes      Diabetic: No      Tobacco smoker: No      Systolic Blood Pressure: 130 mmHg      Is BP treated: Yes      HDL Cholesterol: 59 mg/dL      Total Cholesterol: 140 mg/dL       Review of Systems   Constitutional:  Negative  for fever and unexpected weight change.   Respiratory:  Negative for shortness of breath.    Cardiovascular:  Negative for chest pain.   Psychiatric/Behavioral:  Negative for dysphoric mood, hallucinations, self-injury and sleep disturbance. The patient is nervous/anxious.         See above   No SI or HI       Objective:      Physical Exam  Constitutional:       Appearance: She is well-developed.   HENT:      Head: Normocephalic and atraumatic.      Right Ear: External ear normal.      Left Ear: External ear normal.   Eyes:      Extraocular Movements: Extraocular movements intact.      Conjunctiva/sclera: Conjunctivae normal.   Neck:      Thyroid: No thyromegaly.   Cardiovascular:      Rate and Rhythm: Normal rate and regular rhythm.      Heart sounds: Normal heart sounds.   Pulmonary:      Effort: No respiratory distress.      Breath sounds: No wheezing or rales.   Abdominal:      General: Bowel sounds are normal.      Palpations: Abdomen is soft.   Musculoskeletal:      Cervical back: Normal range of motion and neck supple.   Lymphadenopathy:      Cervical: No cervical adenopathy.   Skin:     General: Skin is warm and dry.      Findings: No erythema or rash.   Neurological:      General: No focal deficit present.      Mental Status: She is alert and oriented to person, place, and time.   Psychiatric:         Mood and Affect: Mood normal.         Behavior: Behavior normal.         Thought Content: Thought content normal.         Judgment: Judgment normal.           Health Maintenance Due   Topic Date Due    COVID-19 Vaccine (4 - Booster for Pfizer series) 02/24/2022

## 2022-12-06 NOTE — PROGRESS NOTES
"Patient ID: Buffy Morin is a 58 y.o. female.    Chief Complaint: Genetic Evaluation (New Patient High Risk)        HPI:  New patient to the breast center presents today for possible increased risk of breast cancer.    Patient denies palpable breast mass, breast pain, breast-related skin changes, nipple discharge and nipple retraction.  No other breast-related concerns.     Breast History:    Pt reports a remote hx of having "something drained with a needle" from 1 breast (unknown laterality), benign per pt; I was unable to locate the pathology report in [facility] today.  Pt denies hx of atypia/hyperplasia/carcinoma of breast.    Personal history of breast cancer:  no  Personal history of breast biopsy:  none other than abovementioned  Personal history of breast surgery:  no    Breast Imaging    19 bilat screening mmg report reviewed:  scattered areas of fibroglandular density  BI-RADS 1  TC-7 lifetime risk 22.95%    GYN History:  Age of menarche:  16-16 y/o   (1 miscarriage), first live birth at 22 y/o  Uterus and ovaries:  S/p complete hysterectomy at ~49 y/o  Menopause:  Surgical at ~49 y/o  HRT usage:  never    Other Oncologic History:  Personal history of other cancer not abovementioned:  no  Personal history of genetic testing:  no    Social History:  Tobacco use:  tried in high school, never a habitual smoker  Alcohol use:  denies  Exercise:  4x/week    Family Oncologic History:    Ashkenazi Sabianist ancestry:  no  History of genetic testing in relatives:  no    Family History   Problem Relation Age of Onset    Cancer Mother         unknown origin    Breast cancer Sister (full) (alive)        in her mid-60s, unilateral    Ovarian cancer Neg Hx          Patient's Breast Cancer Risk Assessment Scores:  Taking into account the above information, the patient's breast cancer risk assessment scores were calculated today as follows:  Bether-Alma lifetime risk:  11.5% (even without breast density " B/P: 118/78  Continue Amlodipine to 10 mg daily, Lisinopril 20 mg daily.   DASH diet  Encouraged home blood pressure monitoring   factored in, still only 13.1%)  Shonda model 5-year risk:  1.8%      Review of Systems - See HPI.  Negative for unexplained fatigue, unexplained weight loss, or any recurrent infections.   Objective:   Physical Exam   Pulmonary/Chest: Effort normal. No respiratory distress. She exhibits no mass, no edema and no deformity. Right breast exhibits no inverted nipple, no mass, no nipple discharge, no skin change and no tenderness. Left breast exhibits no inverted nipple, no mass, no nipple discharge, no skin change and no tenderness. No breast swelling. Breasts are symmetrical.   Genitourinary: No breast swelling.   Lymphadenopathy:     She has no cervical adenopathy.     She has no axillary adenopathy.        Right: No supraclavicular adenopathy present.        Left: No supraclavicular adenopathy present.   She has no right or left infraclavicular adenopathy.   Assessment:       1. Breast cancer screening    2. Family history of breast cancer in first degree relative          Plan:     COUNSELING/PLAN    Elevated 5-year risk counseling and plan:  Discussed with patient that her 5-year breast cancer risk was elevated today at 1.8% per Shonda model.  Discussed with patient modifiable risks for breast cancer including exercising regularly, maintaining a healthy BMI, limiting alcohol consumption to <1 drink/day, and refraining from smoking.  Per NCCN guidelines, recommended Q6-month CBEs along with annual mammogram, with which patient desires to proceed.  Offered patient option to speak with a medical oncologist to discuss option for chemoprevention, which pt declined.    Offered pt referral to Nutrition secondary to her increased risk of breast cancer along with her elevated BMI.  Pt declined.    Counseled patient regarding her NOT being at increased lifetime risk for breast cancer based on risk factors which patient and I discussed today and which were factored into the Tyrer-Cuzick risk assessment model v8.0b, which  estimated a lifetime risk of breast cancer of 11.5% for this patient as of today.  Reviewed pt's 2/2019 mmg intake questionnaire with her, and noted that age of 1st live birth was listed on there as 33; when this age is factored in and breast density is factored out (as it is not included on TC-7), her risk increases to 19%, so these factors likely account for the discrepancy.  Today's calculation used a more recent TC version and answers entered were verified with pt for accuracy.    RTC in 6 months for next CBE.    Next bilat mmg due around 2/7/20.    Clinically PAPO today.    Encouraged breast awareness, including monthly breast self-exams.  Advised patient to RTC with any interval changes or concerns.  Questions were encouraged and answered to patient's satisfaction, and patient verbalized understanding of information and agreement with the plan.

## 2023-04-21 ENCOUNTER — TELEPHONE (OUTPATIENT)
Dept: INTERNAL MEDICINE | Facility: CLINIC | Age: 63
End: 2023-04-21
Payer: COMMERCIAL

## 2023-04-21 NOTE — TELEPHONE ENCOUNTER
----- Message from Oj Ballesteros sent at 4/21/2023  1:32 PM CDT -----  Name of Who is Calling: YUMIKO BOBBY [[id number]]           What is the request in detail: PT stated she would like to reschedule  her labs but before she do how long will she have to fast. Is it the 12 hour fasting?Please contact to further discuss and advise.            Can the clinic reply by [facility]:  [phone]           What Number to [facility] Back [facility]: [phone]

## 2023-04-24 ENCOUNTER — LAB VISIT (OUTPATIENT)
Dept: LAB | Facility: HOSPITAL | Age: 63
End: 2023-04-24
Attending: INTERNAL MEDICINE
Payer: COMMERCIAL

## 2023-04-24 ENCOUNTER — OFFICE VISIT (OUTPATIENT)
Dept: INTERNAL MEDICINE | Facility: CLINIC | Age: 63
End: 2023-04-24
Payer: COMMERCIAL

## 2023-04-24 VITALS
SYSTOLIC BLOOD PRESSURE: 135 MMHG | HEIGHT: 63 IN | DIASTOLIC BLOOD PRESSURE: 80 MMHG | WEIGHT: 174 LBS | BODY MASS INDEX: 30.83 KG/M2

## 2023-04-24 DIAGNOSIS — Z00.00 ANNUAL PHYSICAL EXAM: Primary | ICD-10-CM

## 2023-04-24 DIAGNOSIS — R73.01 IFG (IMPAIRED FASTING GLUCOSE): ICD-10-CM

## 2023-04-24 DIAGNOSIS — E66.09 CLASS 1 OBESITY DUE TO EXCESS CALORIES WITHOUT SERIOUS COMORBIDITY WITH BODY MASS INDEX (BMI) OF 30.0 TO 30.9 IN ADULT: ICD-10-CM

## 2023-04-24 DIAGNOSIS — Z00.00 ANNUAL PHYSICAL EXAM: ICD-10-CM

## 2023-04-24 DIAGNOSIS — I10 ESSENTIAL HYPERTENSION: ICD-10-CM

## 2023-04-24 DIAGNOSIS — E78.2 MIXED HYPERLIPIDEMIA: ICD-10-CM

## 2023-04-24 LAB
ALBUMIN SERPL BCP-MCNC: 4.2 G/DL (ref 3.5–5.2)
ALP SERPL-CCNC: 124 U/L (ref 55–135)
ALT SERPL W/O P-5'-P-CCNC: 23 U/L (ref 10–44)
ANION GAP SERPL CALC-SCNC: 7 MMOL/L (ref 8–16)
AST SERPL-CCNC: 24 U/L (ref 10–40)
BASOPHILS # BLD AUTO: 0.03 K/UL (ref 0–0.2)
BASOPHILS NFR BLD: 0.6 % (ref 0–1.9)
BILIRUB SERPL-MCNC: 0.4 MG/DL (ref 0.1–1)
BUN SERPL-MCNC: 10 MG/DL (ref 8–23)
CALCIUM SERPL-MCNC: 9.7 MG/DL (ref 8.7–10.5)
CHLORIDE SERPL-SCNC: 107 MMOL/L (ref 95–110)
CHOLEST SERPL-MCNC: 123 MG/DL (ref 120–199)
CHOLEST/HDLC SERPL: 2.6 {RATIO} (ref 2–5)
CO2 SERPL-SCNC: 28 MMOL/L (ref 23–29)
CREAT SERPL-MCNC: 0.9 MG/DL (ref 0.5–1.4)
DIFFERENTIAL METHOD: ABNORMAL
EOSINOPHIL # BLD AUTO: 0.1 K/UL (ref 0–0.5)
EOSINOPHIL NFR BLD: 1.7 % (ref 0–8)
ERYTHROCYTE [DISTWIDTH] IN BLOOD BY AUTOMATED COUNT: 15 % (ref 11.5–14.5)
EST. GFR  (NO RACE VARIABLE): >60 ML/MIN/1.73 M^2
ESTIMATED AVG GLUCOSE: 131 MG/DL (ref 68–131)
GLUCOSE SERPL-MCNC: 91 MG/DL (ref 70–110)
HBA1C MFR BLD: 6.2 % (ref 4–5.6)
HCT VFR BLD AUTO: 39.9 % (ref 37–48.5)
HDLC SERPL-MCNC: 48 MG/DL (ref 40–75)
HDLC SERPL: 39 % (ref 20–50)
HGB BLD-MCNC: 12.4 G/DL (ref 12–16)
IMM GRANULOCYTES # BLD AUTO: 0 K/UL (ref 0–0.04)
IMM GRANULOCYTES NFR BLD AUTO: 0 % (ref 0–0.5)
LDLC SERPL CALC-MCNC: 66.2 MG/DL (ref 63–159)
LYMPHOCYTES # BLD AUTO: 2.4 K/UL (ref 1–4.8)
LYMPHOCYTES NFR BLD: 46.6 % (ref 18–48)
MCH RBC QN AUTO: 26.4 PG (ref 27–31)
MCHC RBC AUTO-ENTMCNC: 31.1 G/DL (ref 32–36)
MCV RBC AUTO: 85 FL (ref 82–98)
MONOCYTES # BLD AUTO: 0.3 K/UL (ref 0.3–1)
MONOCYTES NFR BLD: 6 % (ref 4–15)
NEUTROPHILS # BLD AUTO: 2.3 K/UL (ref 1.8–7.7)
NEUTROPHILS NFR BLD: 45.1 % (ref 38–73)
NONHDLC SERPL-MCNC: 75 MG/DL
NRBC BLD-RTO: 0 /100 WBC
PLATELET # BLD AUTO: 295 K/UL (ref 150–450)
PMV BLD AUTO: 11.7 FL (ref 9.2–12.9)
POTASSIUM SERPL-SCNC: 4.7 MMOL/L (ref 3.5–5.1)
PROT SERPL-MCNC: 7.7 G/DL (ref 6–8.4)
RBC # BLD AUTO: 4.7 M/UL (ref 4–5.4)
SODIUM SERPL-SCNC: 142 MMOL/L (ref 136–145)
TRIGL SERPL-MCNC: 44 MG/DL (ref 30–150)
TSH SERPL DL<=0.005 MIU/L-ACNC: 0.63 UIU/ML (ref 0.4–4)
WBC # BLD AUTO: 5.17 K/UL (ref 3.9–12.7)

## 2023-04-24 PROCEDURE — 99396 PREV VISIT EST AGE 40-64: CPT | Mod: S$GLB,,, | Performed by: INTERNAL MEDICINE

## 2023-04-24 PROCEDURE — 99396 PR PREVENTIVE VISIT,EST,40-64: ICD-10-PCS | Mod: S$GLB,,, | Performed by: INTERNAL MEDICINE

## 2023-04-24 PROCEDURE — 36415 COLL VENOUS BLD VENIPUNCTURE: CPT | Mod: PO | Performed by: INTERNAL MEDICINE

## 2023-04-24 PROCEDURE — 3079F PR MOST RECENT DIASTOLIC BLOOD PRESSURE 80-89 MM HG: ICD-10-PCS | Mod: CPTII,S$GLB,, | Performed by: INTERNAL MEDICINE

## 2023-04-24 PROCEDURE — 3008F BODY MASS INDEX DOCD: CPT | Mod: CPTII,S$GLB,, | Performed by: INTERNAL MEDICINE

## 2023-04-24 PROCEDURE — 85025 COMPLETE CBC W/AUTO DIFF WBC: CPT | Performed by: INTERNAL MEDICINE

## 2023-04-24 PROCEDURE — 3008F PR BODY MASS INDEX (BMI) DOCUMENTED: ICD-10-PCS | Mod: CPTII,S$GLB,, | Performed by: INTERNAL MEDICINE

## 2023-04-24 PROCEDURE — 3075F SYST BP GE 130 - 139MM HG: CPT | Mod: CPTII,S$GLB,, | Performed by: INTERNAL MEDICINE

## 2023-04-24 PROCEDURE — 80061 LIPID PANEL: CPT | Performed by: INTERNAL MEDICINE

## 2023-04-24 PROCEDURE — 1159F MED LIST DOCD IN RCRD: CPT | Mod: CPTII,S$GLB,, | Performed by: INTERNAL MEDICINE

## 2023-04-24 PROCEDURE — 3075F PR MOST RECENT SYSTOLIC BLOOD PRESS GE 130-139MM HG: ICD-10-PCS | Mod: CPTII,S$GLB,, | Performed by: INTERNAL MEDICINE

## 2023-04-24 PROCEDURE — 99999 PR PBB SHADOW E&M-EST. PATIENT-LVL III: ICD-10-PCS | Mod: PBBFAC,,, | Performed by: INTERNAL MEDICINE

## 2023-04-24 PROCEDURE — 84443 ASSAY THYROID STIM HORMONE: CPT | Performed by: INTERNAL MEDICINE

## 2023-04-24 PROCEDURE — 83036 HEMOGLOBIN GLYCOSYLATED A1C: CPT | Performed by: INTERNAL MEDICINE

## 2023-04-24 PROCEDURE — 80053 COMPREHEN METABOLIC PANEL: CPT | Performed by: INTERNAL MEDICINE

## 2023-04-24 PROCEDURE — 3079F DIAST BP 80-89 MM HG: CPT | Mod: CPTII,S$GLB,, | Performed by: INTERNAL MEDICINE

## 2023-04-24 PROCEDURE — 99999 PR PBB SHADOW E&M-EST. PATIENT-LVL III: CPT | Mod: PBBFAC,,, | Performed by: INTERNAL MEDICINE

## 2023-04-24 PROCEDURE — 1159F PR MEDICATION LIST DOCUMENTED IN MEDICAL RECORD: ICD-10-PCS | Mod: CPTII,S$GLB,, | Performed by: INTERNAL MEDICINE

## 2023-04-24 NOTE — PROGRESS NOTES
Patient ID: Buffy Morin is a 62 y.o. female.    Chief Complaint: Annual Exam      Assessment:       1. Annual physical exam    2. IFG (impaired fasting glucose)    3. Class 1 obesity due to excess calories without serious comorbidity with body mass index (BMI) of 30.0 to 30.9 in adult    4. Essential hypertension    5. Mixed hyperlipidemia          Plan:         1. Annual physical exam    2. IFG (impaired fasting glucose)    3. Class 1 obesity due to excess calories without serious comorbidity with body mass index (BMI) of 30.0 to 30.9 in adult    4. Essential hypertension    5. Mixed hyperlipidemia       Labs done today, review  Mammogram will be due in August  Up-to-date with screenings and immunizations  Low salt diet, exercise, 15-20-# weight loss in next 6-12 months' time.  Call if BP > 130/80 on a regular basis.   Annual follow-up, return sooner with problems in the interim    Subjective:   Annual exam    Labs from today pending    BP doing well on her current regimen    BMI 30, reviewed- plan to work on this.    Otherwise up-to-date with screenings and immunizations.    Work stress is better.  Anxiety and stress much improved.  Enjoys walking and bike riding outside.  Sleep is good.    Patient Active Problem List:     Anxiety     Carpal tunnel syndrome     Mild vitamin D deficiency     Mixed hyperlipidemia     Family history of breast cancer in sister     Essential hypertension     S/P hysterectomy: complete     Lumbar radiculopathy     IFG (impaired fasting glucose)     Class 1 obesity due to excess calories without serious comorbidity with body mass index (BMI) of 30.0 to 30.9 in adult     Cervical radiculopathy       Review of Systems   Constitutional:  Negative for fatigue.   HENT:  Negative for hearing loss.    Eyes:  Negative for visual disturbance.   Respiratory:  Negative for cough and shortness of breath.    Cardiovascular:  Negative for chest pain.   Gastrointestinal:  Negative for abdominal  pain, constipation and diarrhea.   Genitourinary:  Negative for dysuria, frequency and vaginal bleeding.   Musculoskeletal:  Negative for joint swelling.   Skin:  Negative for rash.   Neurological:  Negative for weakness, light-headedness and headaches.   Psychiatric/Behavioral:  Negative for sleep disturbance.        Objective:      Physical Exam  Vitals and nursing note reviewed.   Constitutional:       Appearance: She is well-developed.   HENT:      Head: Normocephalic and atraumatic.      Right Ear: External ear normal.      Left Ear: External ear normal.      Nose: Nose normal.      Mouth/Throat:      Pharynx: No oropharyngeal exudate.   Eyes:      General: No scleral icterus.     Extraocular Movements: Extraocular movements intact.      Conjunctiva/sclera: Conjunctivae normal.   Neck:      Thyroid: No thyromegaly.      Vascular: No JVD.   Cardiovascular:      Rate and Rhythm: Normal rate and regular rhythm.      Heart sounds: Normal heart sounds. No murmur heard.    No gallop.   Pulmonary:      Effort: Pulmonary effort is normal. No respiratory distress.      Breath sounds: Normal breath sounds. No wheezing.   Abdominal:      General: Bowel sounds are normal. There is no distension.      Palpations: Abdomen is soft. There is no mass.      Tenderness: There is no abdominal tenderness. There is no guarding or rebound.   Musculoskeletal:         General: No tenderness. Normal range of motion.      Cervical back: Normal range of motion and neck supple.   Lymphadenopathy:      Cervical: No cervical adenopathy.   Skin:     General: Skin is warm.      Findings: No erythema or rash.   Neurological:      General: No focal deficit present.      Mental Status: She is alert and oriented to person, place, and time.      Cranial Nerves: No cranial nerve deficit.      Coordination: Coordination normal.   Psychiatric:         Behavior: Behavior normal.         Thought Content: Thought content normal.         Judgment:  Judgment normal.           Health Maintenance Due   Topic Date Due    Hemoglobin A1c (Prediabetes)  01/08/2023

## 2023-07-06 RX ORDER — ESCITALOPRAM OXALATE 20 MG/1
TABLET ORAL
Qty: 90 TABLET | Refills: 3 | Status: SHIPPED | OUTPATIENT
Start: 2023-07-06

## 2023-07-06 NOTE — TELEPHONE ENCOUNTER
No care due was identified.  Health [facility] Embedded Care Due Messages. Reference number: [id number].   7/06/2023 12:21:20 AM CDT

## 2023-07-06 NOTE — TELEPHONE ENCOUNTER
Refill Decision Note   Buffy Mikel  is requesting a refill authorization.  Brief Assessment and Rationale for Refill:  Approve     Medication Therapy Plan:  previous order matches    Medication Reconciliation Completed: No   Comments:     No Care Gaps recommended.     Note composed:9:57 AM 07/06/2023

## 2023-09-19 ENCOUNTER — OFFICE VISIT (OUTPATIENT)
Dept: ORTHOPEDICS | Facility: CLINIC | Age: 63
End: 2023-09-19
Payer: COMMERCIAL

## 2023-09-19 ENCOUNTER — HOSPITAL ENCOUNTER (OUTPATIENT)
Dept: RADIOLOGY | Facility: HOSPITAL | Age: 63
Discharge: HOME | End: 2023-09-19
Attending: PHYSICIAN ASSISTANT
Payer: COMMERCIAL

## 2023-09-19 VITALS
HEIGHT: 62 IN | WEIGHT: 175.69 LBS | DIASTOLIC BLOOD PRESSURE: 82 MMHG | SYSTOLIC BLOOD PRESSURE: 135 MMHG | BODY MASS INDEX: 32.33 KG/M2 | HEART RATE: 78 BPM

## 2023-09-19 DIAGNOSIS — M25.562 PAIN IN BOTH KNEES, UNSPECIFIED CHRONICITY: ICD-10-CM

## 2023-09-19 DIAGNOSIS — M25.562 PAIN IN BOTH KNEES, UNSPECIFIED CHRONICITY: Primary | ICD-10-CM

## 2023-09-19 DIAGNOSIS — M25.561 PAIN IN BOTH KNEES, UNSPECIFIED CHRONICITY: ICD-10-CM

## 2023-09-19 DIAGNOSIS — M17.0 PRIMARY OSTEOARTHRITIS OF BOTH KNEES: Primary | ICD-10-CM

## 2023-09-19 DIAGNOSIS — M25.561 PAIN IN BOTH KNEES, UNSPECIFIED CHRONICITY: Primary | ICD-10-CM

## 2023-09-19 PROCEDURE — 3044F HG A1C LEVEL LT 7.0%: CPT | Mod: CPTII,S$GLB,, | Performed by: PHYSICIAN ASSISTANT

## 2023-09-19 PROCEDURE — 99214 PR OFFICE/OUTPT VISIT, EST, LEVL IV, 30-39 MIN: ICD-10-PCS | Mod: S$GLB,,, | Performed by: PHYSICIAN ASSISTANT

## 2023-09-19 PROCEDURE — 3079F DIAST BP 80-89 MM HG: CPT | Mod: CPTII,S$GLB,, | Performed by: PHYSICIAN ASSISTANT

## 2023-09-19 PROCEDURE — 1159F MED LIST DOCD IN RCRD: CPT | Mod: CPTII,S$GLB,, | Performed by: PHYSICIAN ASSISTANT

## 2023-09-19 PROCEDURE — 99999 PR PBB SHADOW E&M-EST. PATIENT-LVL III: CPT | Mod: PBBFAC,,, | Performed by: PHYSICIAN ASSISTANT

## 2023-09-19 PROCEDURE — 3075F PR MOST RECENT SYSTOLIC BLOOD PRESS GE 130-139MM HG: ICD-10-PCS | Mod: CPTII,S$GLB,, | Performed by: PHYSICIAN ASSISTANT

## 2023-09-19 PROCEDURE — 99999 PR PBB SHADOW E&M-EST. PATIENT-LVL III: ICD-10-PCS | Mod: PBBFAC,,, | Performed by: PHYSICIAN ASSISTANT

## 2023-09-19 PROCEDURE — 1160F PR REVIEW ALL MEDS BY PRESCRIBER/CLIN PHARMACIST DOCUMENTED: ICD-10-PCS | Mod: CPTII,S$GLB,, | Performed by: PHYSICIAN ASSISTANT

## 2023-09-19 PROCEDURE — 3008F PR BODY MASS INDEX (BMI) DOCUMENTED: ICD-10-PCS | Mod: CPTII,S$GLB,, | Performed by: PHYSICIAN ASSISTANT

## 2023-09-19 PROCEDURE — 3075F SYST BP GE 130 - 139MM HG: CPT | Mod: CPTII,S$GLB,, | Performed by: PHYSICIAN ASSISTANT

## 2023-09-19 PROCEDURE — 73562 X-RAY EXAM OF KNEE 3: CPT | Mod: 26,50,, | Performed by: INTERNAL MEDICINE

## 2023-09-19 PROCEDURE — 3079F PR MOST RECENT DIASTOLIC BLOOD PRESSURE 80-89 MM HG: ICD-10-PCS | Mod: CPTII,S$GLB,, | Performed by: PHYSICIAN ASSISTANT

## 2023-09-19 PROCEDURE — 73562 XR KNEE ORTHO BILAT: ICD-10-PCS | Mod: 26,50,, | Performed by: INTERNAL MEDICINE

## 2023-09-19 PROCEDURE — 73562 X-RAY EXAM OF KNEE 3: CPT | Mod: TC,50

## 2023-09-19 PROCEDURE — 3008F BODY MASS INDEX DOCD: CPT | Mod: CPTII,S$GLB,, | Performed by: PHYSICIAN ASSISTANT

## 2023-09-19 PROCEDURE — 99214 OFFICE O/P EST MOD 30 MIN: CPT | Mod: S$GLB,,, | Performed by: PHYSICIAN ASSISTANT

## 2023-09-19 PROCEDURE — 1160F RVW MEDS BY RX/DR IN RCRD: CPT | Mod: CPTII,S$GLB,, | Performed by: PHYSICIAN ASSISTANT

## 2023-09-19 PROCEDURE — 3044F PR MOST RECENT HEMOGLOBIN A1C LEVEL <7.0%: ICD-10-PCS | Mod: CPTII,S$GLB,, | Performed by: PHYSICIAN ASSISTANT

## 2023-09-19 PROCEDURE — 1159F PR MEDICATION LIST DOCUMENTED IN MEDICAL RECORD: ICD-10-PCS | Mod: CPTII,S$GLB,, | Performed by: PHYSICIAN ASSISTANT

## 2023-09-19 RX ORDER — MELOXICAM 15 MG/1
15 TABLET ORAL DAILY
Qty: 30 TABLET | Refills: 2 | Status: SHIPPED | OUTPATIENT
Start: 2023-09-19 | End: 2024-01-04

## 2023-09-19 NOTE — PROGRESS NOTES
SUBJECTIVE:     Chief Complaint & History of Present Illness:  Buffy Morin is a Established patient 62 y.o. female who is seen here today with a complaint of  sore and achy  Chief Complaint   Patient presents with    Left Knee - Pain, Swelling    Right Shoulder - Pain    Left Shoulder - Pain    Right Hand - Pain    Left Hand - Pain    .  She is patient well-known to me was last seen treated the clinic 06/28/2022 for neck pain.  She is done very well she is going to have return of pain soreness in bilateral knees as well as to a lesser degree in the shoulders and thumbs.  On a scale of 1-10, with 10 being worst pain imaginable, he rates this pain as 3 on good days and 6 on bad days.  she describes the pain as sore and achy.    Review of patient's allergies indicates:   Allergen Reactions    No known drug allergies          Current Outpatient Medications   Medication Sig Dispense Refill    amLODIPine (NORVASC) 10 MG tablet TAKE 1 TABLET BY MOUTH EVERY DAY 90 tablet 2    atorvastatin (LIPITOR) 80 MG tablet TAKE 1 TABLET (80 MG TOTAL) BY MOUTH ONCE DAILY. 90 tablet 2    cholecalciferol, vitamin D3, (VITAMIN D3) 50 mcg (2,000 unit) Cap Take 1 capsule by mouth Daily. Take vitamin D 2000 units every day.      diclofenac sodium (VOLTAREN) 1 % Gel Apply two grams to affected area 3-4 times as needed (Patient not taking: Reported on 9/19/2023) 1 each 3    EScitalopram oxalate (LEXAPRO) 20 MG tablet TAKE 1 TABLET BY MOUTH EVERY DAY 90 tablet 3    gabapentin (NEURONTIN) 100 MG capsule Take 1 capsule (100 mg total) by mouth 3 (three) times daily. (Patient not taking: Reported on 9/19/2023) 90 capsule 0    LORazepam (ATIVAN) 0.5 MG tablet Take 1 tablet (0.5 mg total) by mouth every 8 (eight) hours as needed for Anxiety. 30 tablet 0    meloxicam (MOBIC) 15 MG tablet TAKE 1 TABLET BY MOUTH EVERY DAY (Patient not taking: Reported on 9/19/2023) 30 tablet 1    meloxicam (MOBIC) 15 MG tablet Take 1 tablet (15 mg total) by mouth  once daily. 30 tablet 2     Current Facility-Administered Medications   Medication Dose Route Frequency Provider Last Rate Last Admin    sodium hyaluronate (EUFLEXXA) 10 mg/mL(mw 2.4 -3.6 million) injection 40 mg  40 mg Intra-articular Weekly Rodrigo Sampson PA-C           Past Medical History:   Diagnosis Date    Abnormal Pap smear of cervix     Anxiety     At high risk for breast cancer: TC 7 > 20% 2019 2/6/2019    Carpal tunnel syndrome     Elevated blood pressure     Family history of breast cancer in sister 10/1/2013    Hypertension     S/P hysterectomy 9/23/2017       Past Surgical History:   Procedure Laterality Date    BREAST BIOPSY Right     2007    COLONOSCOPY N/A 8/19/2019    Procedure: COLONOSCOPY;  Surgeon: ROHIT Romero MD;  Location: [address]);  Service: Endoscopy;  Laterality: N/A;    EPIDURAL STEROID INJECTION INTO CERVICAL SPINE N/A 8/3/2022    Procedure: Injection-steroid-epidural-cervical IL C7-T1;  Surgeon: Justina Hollingsworth MD;  Location: [facility]IN [facility]GT;  Service: Pain Management;  Laterality: N/A;    HYSTERECTOMY      Total    TRANSFORAMINAL EPIDURAL INJECTION OF STEROID Bilateral 12/23/2019    Procedure: LUMBAR TRANSFORAMINAL BILATERAL L4/5 TF JASON;  Surgeon: Hernesto Lazo MD;  [facility];  Service: Pain Management;  Laterality: Bilateral;  NEEDS CONSENT    TRANSFORAMINAL EPIDURAL INJECTION OF STEROID Bilateral 1/16/2020    Procedure: LUMBAR TRANSFORAMINAL BILATERAL L4/5 TF JASON DIRECT REFERRAL;  Surgeon: Pamela Erazo MD;  Location: [facility];  Service: Pain Management;  Laterality: Bilateral;  NEEDS CONSENT    TUBAL LIGATION         Vital Signs (Most Recent)  Vitals:    09/19/23 1549   BP: 135/82   Pulse: 78           Review of Systems:  ROS:  Constitutional: no fever or chills  Eyes: no visual changes  ENT: no nasal congestion or sore throat  Respiratory: no cough or shortness of breath  Cardiovascular: no chest pain or palpitations  Gastrointestinal: no  "nausea or vomiting, tolerating diet  Genitourinary: no hematuria or dysuria  Integument/Breast: no rash or pruritis  Hematologic/Lymphatic: no easy bruising or lymphadenopathy  Musculoskeletal: no arthralgias or myalgias  Neurological: no seizures or tremors, Positive carpal tunnel syndrome, chronic pain, lumbar radiculopathy  Behavioral/Psych: no auditory or visual hallucinations, Positive for anxiety  Endocrine: no heat or cold intolerance, Positive vitamin-D deficiency                   OBJECTIVE:     PHYSICAL EXAM:  Height: 5' 2" (157.5 cm) Weight: 79.7 kg (175 lb 11.3 oz), General Appearance: Well nourished, well developed, in no acute distress.  Neurological: Mood & affect are normal.    left  Knee Exam:  Knee Range of Motion:0-120 degrees flexion   Effusion:none  Condition of skin:intact  Location of tenderness:Medial joint line   Strength:limited by pain and 5 of 5  Stability:  Lachman: stable, LCL: stable, MCL: stable, PCL: stable, and posteromedial (dial): stable  Varus /Valgus stress:  normal  Vida:   negative/negative    right  Knee Exam:  Knee Range of Motion:0-120 degrees flexion   Effusion:none  Condition of skin:intact  Location of tenderness:Medial joint line   Strength:limited by pain and 5 of 5  Stability:  Lachman: stable, LCL: stable, MCL: stable, PCL: stable, and posteromedial (dial): stable  Varus /Valgus stress:  normal  Vida:   negative/negative      Hip Examination:  normal    RADIOGRAPHS:  Repeat x-rays of the knees taken today films reviewed by me demonstrate mild to moderate arthritic changes throughout both knees with medial joint space narrowing osteophytic spurring and sclerotic changes tricompartmentally most notable in the medial compartments no evidence of fracture dislocation or other bony abnormalities    MEDICAL NECESSITY FOR VISCOSUPPLENTATION: After thorough evaluation of the patient, I have determined that visco-supplementation is medically necessary. The patient has " painful DJD of the knee with failure of conservative therapy including lifestyle modifications and rehabilitation exercises. Oral analgesics ? NSAIDS have not adequately controlled symptoms and there is radiographic evidence of joint space narrowing, subchondral sclerosis, and osteophytic changes, or in lack of radiographic evidence, there is arthroscopic or other evidence of chondrosis Kellgren-Juan grade 2 or greater.     ASSESSMENT/PLAN:       ICD-10-CM ICD-9-CM   1. Primary osteoarthritis of both knees  M17.0 715.16       Plan: We discussed with the patient at length all the different treatment options available for  the knee including anti-inflammatories, acetaminophen, rest, ice, knee strengthening exercise, occasional cortisone injections for temporary relief, Viscosupplimentation injections, arthroscopic debridement osteotomy, and finally knee arthroplasty.   Will Euflexxa injections for bilateral knees plan to begin next week  Meloxicam 15 mg q.d. with food

## 2023-09-20 ENCOUNTER — OFFICE VISIT (OUTPATIENT)
Dept: PAIN MEDICINE | Facility: CLINIC | Age: 63
End: 2023-09-20
Payer: COMMERCIAL

## 2023-09-20 VITALS
HEIGHT: 62 IN | SYSTOLIC BLOOD PRESSURE: 136 MMHG | DIASTOLIC BLOOD PRESSURE: 76 MMHG | HEART RATE: 73 BPM | WEIGHT: 174 LBS | BODY MASS INDEX: 32.02 KG/M2 | RESPIRATION RATE: 12 BRPM

## 2023-09-20 DIAGNOSIS — M54.12 CERVICAL RADICULOPATHY: Primary | ICD-10-CM

## 2023-09-20 PROCEDURE — 99204 PR OFFICE/OUTPT VISIT, NEW, LEVL IV, 45-59 MIN: ICD-10-PCS | Mod: S$GLB,,, | Performed by: ANESTHESIOLOGY

## 2023-09-20 PROCEDURE — 1159F MED LIST DOCD IN RCRD: CPT | Mod: CPTII,S$GLB,, | Performed by: ANESTHESIOLOGY

## 2023-09-20 PROCEDURE — 3075F SYST BP GE 130 - 139MM HG: CPT | Mod: CPTII,S$GLB,, | Performed by: ANESTHESIOLOGY

## 2023-09-20 PROCEDURE — 3008F BODY MASS INDEX DOCD: CPT | Mod: CPTII,S$GLB,, | Performed by: ANESTHESIOLOGY

## 2023-09-20 PROCEDURE — 3078F DIAST BP <80 MM HG: CPT | Mod: CPTII,S$GLB,, | Performed by: ANESTHESIOLOGY

## 2023-09-20 PROCEDURE — 3044F HG A1C LEVEL LT 7.0%: CPT | Mod: CPTII,S$GLB,, | Performed by: ANESTHESIOLOGY

## 2023-09-20 PROCEDURE — 3078F PR MOST RECENT DIASTOLIC BLOOD PRESSURE < 80 MM HG: ICD-10-PCS | Mod: CPTII,S$GLB,, | Performed by: ANESTHESIOLOGY

## 2023-09-20 PROCEDURE — 99204 OFFICE O/P NEW MOD 45 MIN: CPT | Mod: S$GLB,,, | Performed by: ANESTHESIOLOGY

## 2023-09-20 PROCEDURE — 3044F PR MOST RECENT HEMOGLOBIN A1C LEVEL <7.0%: ICD-10-PCS | Mod: CPTII,S$GLB,, | Performed by: ANESTHESIOLOGY

## 2023-09-20 PROCEDURE — 3008F PR BODY MASS INDEX (BMI) DOCUMENTED: ICD-10-PCS | Mod: CPTII,S$GLB,, | Performed by: ANESTHESIOLOGY

## 2023-09-20 PROCEDURE — 99999 PR PBB SHADOW E&M-EST. PATIENT-LVL IV: ICD-10-PCS | Mod: PBBFAC,,, | Performed by: ANESTHESIOLOGY

## 2023-09-20 PROCEDURE — 1159F PR MEDICATION LIST DOCUMENTED IN MEDICAL RECORD: ICD-10-PCS | Mod: CPTII,S$GLB,, | Performed by: ANESTHESIOLOGY

## 2023-09-20 PROCEDURE — 3075F PR MOST RECENT SYSTOLIC BLOOD PRESS GE 130-139MM HG: ICD-10-PCS | Mod: CPTII,S$GLB,, | Performed by: ANESTHESIOLOGY

## 2023-09-20 PROCEDURE — 99999 PR PBB SHADOW E&M-EST. PATIENT-LVL IV: CPT | Mod: PBBFAC,,, | Performed by: ANESTHESIOLOGY

## 2023-09-20 NOTE — PROGRESS NOTES
PCP: Minoo Carrasquillo MD    REFERRING PHYSICIAN: No ref. provider found    CHIEF COMPLAINT: Shoulder Pain    Original HISTORY OF PRESENT ILLNESS: Buffy Morin presents to the clinic for the evaluation of the above pain. The pain started approximately 2 months ago.     Original Pain Description:  The pain is located in the shoulders and doesn't radiate. The pain is described as aching. Exacerbating factors: Lifting. Mitigating factors heat. Symptoms interfere with sleep, work. The patient feels like symptoms have been improving. Patient denies night fever/night sweats, urinary incontinence, bowel incontinence, significant weight loss, significant motor weakness, and loss of sensations.    Original PAIN SCORES:  Best: Pain is 4  Worst: Pain is 8  Current: Pain is 4      INTERVAL HISTORY: (Newest visit at the bottom)   Interval History (Date):       6 weeks of Conservative therapy:  PT: 2022 (Must include dates)  Chiro: No  HEP: No      Treatments / Medications: (Ice/Heat/NSAIDS/APAP/etc):  Heat - Epsom salts  Ibuprofen   APAP  Meloxicam      Interventional Pain Procedures: (Previous injections)  8/3/22: PELON  12/23/19: B/L L4/5 TFESI    Past Medical History:   Diagnosis Date    Abnormal Pap smear of cervix     Anxiety     At high risk for breast cancer: TC 7 > 20% 2019 2/6/2019    Carpal tunnel syndrome     Elevated blood pressure     Family history of breast cancer in sister 10/1/2013    Hypertension     S/P hysterectomy 9/23/2017     Past Surgical History:   Procedure Laterality Date    BREAST BIOPSY Right     2007    COLONOSCOPY N/A 8/19/2019    Procedure: COLONOSCOPY;  Surgeon: ROHIT Romero MD;  Location: [address];  Service: Endoscopy;  Laterality: N/A;    EPIDURAL STEROID INJECTION INTO CERVICAL SPINE N/A 8/3/2022    Procedure: Injection-steroid-epidural-cervical IL C7-T1;  Surgeon: Justina Hollingsworth MD;  Location: [facility] PAIN T;  Service: Pain Management;  Laterality: N/A;    HYSTERECTOMY       Total    TRANSFORAMINAL EPIDURAL INJECTION OF STEROID Bilateral 12/23/2019    Procedure: LUMBAR TRANSFORAMINAL BILATERAL L4/5 TF JASON;  Surgeon: Hernesto Lazo MD;  Location: [facility];  Service: Pain Management;  Laterality: Bilateral;  NEEDS CONSENT    TRANSFORAMINAL EPIDURAL INJECTION OF STEROID Bilateral 1/16/2020    Procedure: LUMBAR TRANSFORAMINAL BILATERAL L4/5 TF JASON DIRECT REFERRAL;  Surgeon: Pamela Erazo MD;  Location: [facility];  Service: Pain Management;  Laterality: Bilateral;  NEEDS CONSENT    TUBAL LIGATION       Social History     Socioeconomic History    Marital status:     Number of children: 2   Tobacco Use    Smoking status: Never    Smokeless tobacco: Never   Substance and Sexual Activity    Alcohol use: Yes     Comment: rare    Drug use: No    Sexual activity: Yes   Social History Narrative    Oldest son- Joe Carrasquillo is second son with 2 kids- [place]; Akshat 8 months; Aravind age 5 2023 (grandkids)     Social Determinants of Health     Stress: No Stress Concern Present (6/25/2019)    [employer] - Occupational Stress Questionnaire     Feeling of Stress : Only a little     Family History   Problem Relation Age of Onset    Cancer Mother         unknown origin    Hypertension Mother     Hypertension Father     Thyroid disease Sister     Breast cancer Sister         in her mid-60s, unilateral    Depression Sister     Cancer Sister         Breast    Anxiety disorder Sister     No Known Problems Son     No Known Problems Son     Ovarian cancer Neg Hx        Review of patient's allergies indicates:   Allergen Reactions    No known drug allergies        Current Outpatient Medications   Medication Sig    amLODIPine (NORVASC) 10 MG tablet TAKE 1 TABLET BY MOUTH EVERY DAY    atorvastatin (LIPITOR) 80 MG tablet TAKE 1 TABLET (80 MG TOTAL) BY MOUTH ONCE DAILY.    cholecalciferol, vitamin D3, (VITAMIN D3) 50 mcg (2,000 unit) Cap Take 1 capsule by mouth Daily.  "Take vitamin D 2000 units every day.    EScitalopram oxalate (LEXAPRO) 20 MG tablet TAKE 1 TABLET BY MOUTH EVERY DAY    LORazepam (ATIVAN) 0.5 MG tablet Take 1 tablet (0.5 mg total) by mouth every 8 (eight) hours as needed for Anxiety.    meloxicam (MOBIC) 15 MG tablet TAKE 1 TABLET BY MOUTH EVERY DAY    meloxicam (MOBIC) 15 MG tablet Take 1 tablet (15 mg total) by mouth once daily.    diclofenac sodium (VOLTAREN) 1 % Gel Apply two grams to affected area 3-4 times as needed (Patient not taking: Reported on 9/19/2023)    gabapentin (NEURONTIN) 100 MG capsule Take 1 capsule (100 mg total) by mouth 3 (three) times daily. (Patient not taking: Reported on 9/19/2023)     Current Facility-Administered Medications   Medication    sodium hyaluronate (EUFLEXXA) 10 mg/mL(mw 2.4 -3.6 million) injection 40 mg       ROS:  GENERAL: No fever. No chills. No fatigue. Denies weight loss. Denies weight gain.  HEENT: Denies headaches. Denies vision change. Denies eye pain. Denies double vision. Denies ear pain.   CV: Denies chest pain.   PULM: Denies of shortness of breath.  GI: Denies constipation. No diarrhea. No abdominal pain. Denies nausea. Denies vomiting. No blood in stool.  HEME: Denies bleeding problems.  : Denies urgency. No painful urination. No blood in urine.  MS: Denies joint stiffness. Denies joint swelling.  + Neck pain.  SKIN: Denies rash.   NEURO: Denies seizures. No weakness.  PSYCH:  Denies difficulty sleeping. No anxiety. Denies depression. No suicidal thoughts.       VITALS:   Vitals:    09/20/23 1445   BP: 136/76   Pulse: 73   Resp: 12   Weight: 78.9 kg (174 lb)   Height: 5' 2" (1.575 m)   PainSc:   6         PHYSICAL EXAM:   GENERAL: Well appearing, in no acute distress, alert and oriented x3.  PSYCH:  Mood and affect appropriate.  SKIN: Skin color, texture, turgor normal, no rashes or lesions.  HEENT:  Normocephalic, atraumatic. Cranial nerves grossly intact.  NECK: Full ROM. No pain to palpation over the " cervical paraspinous muscles. No pain to palpation over facets. No pain with neck flexion, extension, or lateral flexion. Pain with palpation of midline cervical spine.   PULM: No evidence of respiratory difficulty, symmetric chest rise.  GI:  Non-distended  BACK: Normal range of motion. No pain to palpation over the spinous processes. No pain to palpation over facet joints. There is no pain with palpation over the sacroiliac joints bilaterally.   EXTREMITIES: No deformities, edema, or skin discoloration.   MUSCULOSKELETAL: Normal shoulder exam. No atrophy is noted.  NEURO: Sensation is equal and appropriate bilaterally. Bilateral upper and lower extremity strength is normal and symmetric. Bilateral upper and lower extremity coordination and muscle stretch reflexes are physiologic and symmetric. Plantar response are downgoing. Straight leg raising in the supine position is negative to radicular pain. Normal Clark's.   GAIT: normal.      LABS:      IMAGING:    MRI CERVICAL SPINE WITHOUT CONTRAST     CLINICAL HISTORY:  Neck pain, chronic, degenerative changes on xray;Myelopathy, acute, cervical spine; Other cervical disc degeneration, unspecified cervical region     TECHNIQUE:  Multiplanar, multisequence MR images of the cervical spine were performed without the administration of contrast.     COMPARISON:  X-ray 04/28/2022     FINDINGS:  Alignment: There is straightening of the cervical lordosis.  Subtle grade 1 anterolisthesis C2 on C3 and C5 on C4.     Vertebrae: Multilevel degenerative endplate sclerosis most severe at C4-5.     Discs: Degenerative findings:     C2-C3: Circumferential disc bulging and mild facet arthrosis.     C3-C4: Circumferential disc osteophyte complex and mild facet arthrosis.     C4-C5: Circumferential disc osteophyte complex and mild facet arthrosis contribute to moderate central canal stenosis and moderate left and mild right-sided foraminal stenosis.     C5-C6: Circumferential disc  osteophyte complex mild facet arthrosis.  No central canal stenosis.  Mild bilateral foraminal stenosis.     C6-C7: Circumferential disc osteophyte complex and facet arthrosis.     C7-T1: Bilateral facet arthrosis.     T1-T2: None     Cord: Normal     Skull base and craniocervical junction: Normal.     Paraspinal muscles & soft tissues: Unremarkable.     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        Electronically signed by: Kwabena Mishra Jr  Date:                                            07/11/2022  Time:                                           09:17    ASSESSMENT: 62 y.o. year old female with pain, consistent with:    No diagnosis found.    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 She comes to us with bilateral shoulder pain which is worst with lifting heavy files at work. Imaging shows moderate canal stenosis at C4/5. Exam shows pain reproduced only with palpation of her low cervical spine. Normal muscle and shoulder exam.      PLAN:  Referred to PT  Continue Meloxicam  Follow up in 6 weeks to consider PELON      I would like to thank No ref. provider found for the opportunity to assist in the care of this patient. We had a very nice visit and I look forward to continuing their care. Please let me know if I can be of further assistance.     Mandi Cooney  09/20/2023

## 2023-09-27 ENCOUNTER — OFFICE VISIT (OUTPATIENT)
Dept: ORTHOPEDICS | Facility: CLINIC | Age: 63
End: 2023-09-27
Payer: COMMERCIAL

## 2023-09-27 DIAGNOSIS — M17.0 PRIMARY OSTEOARTHRITIS OF BOTH KNEES: Primary | ICD-10-CM

## 2023-09-27 PROCEDURE — 99499 NO LOS: ICD-10-PCS | Mod: S$GLB,,, | Performed by: PHYSICIAN ASSISTANT

## 2023-09-27 PROCEDURE — 20610 DRAIN/INJ JOINT/BURSA W/O US: CPT | Mod: 50,S$GLB,, | Performed by: PHYSICIAN ASSISTANT

## 2023-09-27 PROCEDURE — 99999 PR PBB SHADOW E&M-EST. PATIENT-LVL III: ICD-10-PCS | Mod: PBBFAC,,, | Performed by: PHYSICIAN ASSISTANT

## 2023-09-27 PROCEDURE — 99999 PR PBB SHADOW E&M-EST. PATIENT-LVL III: CPT | Mod: PBBFAC,,, | Performed by: PHYSICIAN ASSISTANT

## 2023-09-27 PROCEDURE — 99499 UNLISTED E&M SERVICE: CPT | Mod: S$GLB,,, | Performed by: PHYSICIAN ASSISTANT

## 2023-09-27 PROCEDURE — 20610 PR DRAIN/INJECT LARGE JOINT/BURSA: ICD-10-PCS | Mod: 50,S$GLB,, | Performed by: PHYSICIAN ASSISTANT

## 2023-09-27 NOTE — PROGRESS NOTES
Buffy Morin is a 62 y.o. year old her here today for her 1st Euflexxa injection for degenerative changes of her bilateral knee . she was last seen and treated in the clinic on 9/19/2023. There has been no significant change in her medical status since her last visit. No Fever, chills, malaise, or unexplained weight change.      Allergies, Medications, past medical and surgical history were reviewed .    Examination of the knee demonstrates  No evidence of edema, erythema , echymosis strength and range of motion are unchanged from previous visit.    The risks, benefits, pros, cons, and potential side effects of the procedure were discussed with the patient in detail all questions were answered.  The patient is comfortable and willing to proceed with the procedure. Verbal consent was obtained and the proper joint was identified by the patient and provider.     The injection site was identified and the skin was prepared with a betadine solution. The  bilateral knee  joint was injected with 2 ml of Euflexxa solution under sterile technique. Buffy Morin tolerated the procedure well, she was advised to rest the knee today, ice and elevation. I may take 3 -6 weeks following the last injection to get the full benefit of the medication.  I will see her back in 1 week. Sooner if he has any problems or concerns.           .     ICD-10-CM ICD-9-CM   1. Primary osteoarthritis of both knees  M17.0 715.16

## 2023-10-02 ENCOUNTER — CLINICAL SUPPORT (OUTPATIENT)
Dept: REHABILITATION | Facility: HOSPITAL | Age: 63
End: 2023-10-02
Payer: COMMERCIAL

## 2023-10-02 DIAGNOSIS — M54.12 CERVICAL RADICULOPATHY: ICD-10-CM

## 2023-10-02 DIAGNOSIS — M25.511 BILATERAL SHOULDER PAIN, UNSPECIFIED CHRONICITY: ICD-10-CM

## 2023-10-02 DIAGNOSIS — M25.512 BILATERAL SHOULDER PAIN, UNSPECIFIED CHRONICITY: ICD-10-CM

## 2023-10-02 PROBLEM — M25.519 SHOULDER PAIN: Status: ACTIVE | Noted: 2023-10-02

## 2023-10-02 PROCEDURE — 97112 NEUROMUSCULAR REEDUCATION: CPT

## 2023-10-02 PROCEDURE — 97162 PT EVAL MOD COMPLEX 30 MIN: CPT

## 2023-10-02 NOTE — PLAN OF CARE
[facility]   Physical Therapy Initial Evaluation      Name: Buffy Morin  Clinic Number: [id number]    Therapy Diagnosis:   Encounter Diagnoses   Name Primary?    Cervical radiculopathy     Bilateral shoulder pain, unspecified chronicity         Physician: Mandi Cooney MD    Physician Orders: PT Eval and Treat   Medical Diagnosis from Referral: M54.12 (ICD-10-CM) - Cervical radiculopathy  Evaluation Date: 10/2/2023  Authorization Period Expiration: 09/19/2024  Plan of Care Expiration: 12/2/2023  Progress Note Due: 11/2/2023  Visit # / Visits authorized: 1/ 1   FOTO: 1/3    Precautions: Standard and HBP     Time In: 1500  Time Out: 1600  Total Appointment Time (timed & untimed codes): 60 minutes    Subjective     Date of onset: ~3 months ago    History of current condition - Buffy reports:     Patient reports onset of bilateral shoulder pain about 3 weeks ago. 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 Patient feels like job duties contributory to onset of shoulder symptoms. Pain is worst while working or trying to get comfortable sleeping at end of day. States pain is alleviated with use of Aleve and Advil. Denies changes in sensation or myelopathic symptoms.    She has had something similar in the past, which was addressed via CSI a year ago.    Currently is being treated for pain in chaz hands, knees, and for sciatica. She is taking meloxicam for hands and is getting injection series in knees.    PER PAIN MED NOTE 9/20/23:    CHIEF COMPLAINT: Shoulder Pain     Original HISTORY OF PRESENT ILLNESS: Buffy Morin presents to the clinic for the evaluation of the above pain. The pain started approximately 2 months ago.      Original Pain Description:  The pain is located in the shoulders and doesn't radiate. The pain is described as aching. Exacerbating factors: Lifting. Mitigating factors heat. Symptoms interfere  with sleep, work. The patient feels like symptoms have been improving. Patient denies night fever/night sweats, urinary incontinence, bowel incontinence, significant weight loss, significant motor weakness, and loss of sensations.     Original PAIN SCORES:  Best: Pain is 4  Worst: Pain is 8  Current: Pain is 4    PER ORTHO NOTE 7/14/22:    CHIEF COMPLAINT: bilateral shoulder pain     HISTORY:  Buffy Morin is a 61 y.o. female here for initial evaluation of bilateral shoulder pain (7/10). The pain has been present for months. The patient describes the pain as aching. The pain is worse with nothing and improved by nothing and occurs constantly. There is negative associated numbness and tingling. There is negative subjective weakness. Prior treatments have included PT for 6 weeks with no relief, but no ESIs or surgery. Pt was seen by Rodrigo Sampson PA-C on 6/28/22 for similar complaints.      The patient denies myelopathic symptoms such as handwriting changes or difficulty with buttons/coins/keys. Denies perineal paresthesias, bowel/bladder dysfunction.    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    Prior Therapy: none for C/S or shoulder symptoms  Social History: lives alone  Occupation: [employer]  Prior Level of Function: ADLs w/o complaint  Current Level of Function: pain during work duties and at end of day    Pain:  Current 2/10, worst 8/10, best 1/10   Location: bilateral shoulders  Description: Aching, Dull, Sharp, and Shooting  Aggravating Factors: Night Time and Lifting  Easing Factors: rest and NSAID    Patients goals: ADLs w/o complaint     Medical History:   Past Medical History:   Diagnosis Date    Abnormal Pap smear of cervix     Anxiety     At high risk for breast cancer: TC 7 > 20% 2019 2/6/2019    Carpal  tunnel syndrome     Elevated blood pressure     Family history of breast cancer in sister 10/1/2013    Hypertension     S/P hysterectomy 9/23/2017       Surgical History:   Buffy Morin  has a past surgical history that includes Tubal ligation; Breast biopsy (Right); Hysterectomy; Colonoscopy (N/A, 8/19/2019); Transforaminal epidural injection of steroid (Bilateral, 12/23/2019); Transforaminal epidural injection of steroid (Bilateral, 1/16/2020); and Epidural steroid injection into cervical spine (N/A, 8/3/2022).    Medications:   Buffy has a current medication list which includes the following prescription(s): amlodipine, atorvastatin, cholecalciferol (vitamin d3), diclofenac sodium, escitalopram oxalate, gabapentin, lorazepam, meloxicam, and meloxicam, and the following Facility-Administered Medications: sodium hyaluronate (euflexxa).    Allergies:   Review of patient's allergies indicates:   Allergen Reactions    No known drug allergies         Objective      Observation/Posture: fwd head and protracted scapulae    Palpation: TTP at supraspinatus and biceps tendon chaz    Sensation: WFL    Neural tension:   -ULTT Median: + on R  -ULTT Ulnar: -  -ULTT Radial: + on R    Cervical AROM Range of Motion:    Degrees Pain With Muscular Offload   Flexion 44 -      Extension 40 -      Right Rotation 57 -   np   Left Rotation 55 -   np     Cervical PROM:   Upper Cervical Rotation - R = -; L = -    Cervical joint mobility: hypomobility at lower C/S and CTJ    Special Tests:  Distraction -   Spurlings -   Bakody's Sign + negative sign on R   CRLF (1st rib) hypomobile     Shoulder Range of Motion:   Shoulder Left Right   Flexion 160 160   Abduction 155 155   ER WFL WFL   IR Hypomobile and limited at 90 deg Hypomobile and limited at 90 deg     Strength:  Shoulder Right Left   Supraspinatus Full Can 3/5 3/5   ER 3/5 3/5   IR 4-/5 4-/5   Middle Trap 3/5 3/5   Low Trap 3-/5 3-/5     Joint Mobility:   AP - hypo  Inferior - mild  hypo    Flexibility:  Lat: R +++ ; L +++   Pec Minor: R +++ ; L +++   Raghavendra Test (Posterior RC): R - ; L -    Special Tests:  Impingement:   Right Left   Hawkin's Kenndy + +   Neer's Test + +   Ryan's Test + +     Rotator Cuff / Bicep Tendon:   Right Left   Painful Arc - -   Empty Can Test + -   Speed's Test + +       Limitation/Restriction for FOTO Neck Survey    Therapist reviewed FOTO scores for Buffy Morin on 10/2/2023.   FOTO documents entered into EPIC - see Media section.    Limitation Score: see media%         Treatment     Total Treatment time (time-based codes) separate from Evaluation: 25 minutes     Buffy received the treatments listed below:      neuromuscular re-education activities to improve: Balance, Coordination, Kinesthetic, Sense, Proprioception, and Posture for 25 minutes. The following activities were included:    Patient Education:  - impairments  - POC timelines 4-6 weeks  - cuing for form  - HEP    Pec inhibition at doorway 3x30 sec  Row w/o resistance 3x10x5 sec  W w/o resistance 3x10x5 sec - HEP  Serratus wall V RTB 3x10x5 sec - HEP      NEXT VISIT - half FR laying if abhilash for LBP      Patient Education and Home Exercises     Education provided:   - see above    Written Home Exercises Provided: yes. Exercises were reviewed and Buffy was able to demonstrate them prior to the end of the session.  Buffy demonstrated good  understanding of the education provided. See EMR under Patient Instructions for exercises provided during therapy sessions.    Assessment     Buffy is a 62 y.o. female referred to outpatient Physical Therapy with a medical diagnosis of M54.12 (ICD-10-CM) - Cervical radiculopathy. Patient presents with pain, decreased neck and shoulder ROM, UE neural tension, postural deficits sloane of scapulae, + impingement testing, along with poor RC and scapular strength. Impairments contributory to pain affecting performance of work duties and comfort at end of day when trying to  sleep.    Patient prognosis is Good.   Patient will benefit from skilled outpatient Physical Therapy to address the deficits stated above and in the chart below, provide patient /family education, and to maximize patientt's level of independence.     Plan of care discussed with patient: Yes  Patient's spiritual, cultural and educational needs considered and patient is agreeable to the plan of care and goals as stated below:     Anticipated Barriers for therapy: age / job duties    Medical Necessity is demonstrated by the following  History  Co-morbidities and personal factors that may impact the plan of care [] LOW: no personal factors / co-morbidities  [x] MODERATE: 1-2 personal factors / co-morbidities  [] HIGH: 3+ personal factors / co-morbidities    Moderate / High Support Documentation:   Co-morbidities affecting plan of care:  age / job duties    Personal Factors:   See above     Examination  Body Structures and Functions, activity limitations and participation restrictions that may impact the plan of care [] LOW: addressing 1-2 elements  [] MODERATE: 3+ elements  [x] HIGH: 4+ elements (please support below)    Moderate / High Support Documentation: pain, decreased neck and shoulder ROM, UE neural tension, postural deficits sloane of scapulae, + impingement testing, along with poor RC and scapular strength.     Clinical Presentation [] LOW: stable  [x] MODERATE: Evolving  [] HIGH: Unstable     Decision Making/ Complexity Score: moderate         GOALS: Short Term Goals: 0-3 weeks  1.Report decreased shoulder pain  <   / =  4  /10 at worst to increase tolerance for ADLs  2. Increase cervical ROM by 5-10 degrees in order to perform ADLs with decreased difficulty.  3. Increase strength in B shoulders/scapular stabilizers by 1/3 MMT grade to increase tolerance for ADL and work activities.  4. Pt to tolerate HEP to improve ROM and independence with ADL's    Long Term Goals: 4-6 weeks  1.Report decreased shoulder pain   <   / =  2  /10 at worst to increase tolerance for ADLs  2. Negative impingement testing  3.Increased strength in B shoulders/scapular stabilizers to >/= 4/5 MMT grade to increase tolerance for ADL and work activities.  4. Pt will have improved gcode of CJ (20-40% limited) on FOTO neck score for neck pain disability in order to demonstrate true functional improvement.    Plan     Plan of care Certification: 10/2/2023 to 12/2/2023.    Outpatient Physical Therapy 2 times weekly for 4-6 weeks to include the following interventions: Manual Therapy, Moist Heat/ Ice, Neuromuscular Re-ed, Patient Education, Self Care, Therapeutic Activities, and Therapeutic Exercise.     SANTOS ASHER, PT, DPT, OCS

## 2023-10-04 ENCOUNTER — OFFICE VISIT (OUTPATIENT)
Dept: ORTHOPEDICS | Facility: CLINIC | Age: 63
End: 2023-10-04
Payer: COMMERCIAL

## 2023-10-04 DIAGNOSIS — M17.0 BILATERAL PRIMARY OSTEOARTHRITIS OF KNEE: Primary | ICD-10-CM

## 2023-10-04 PROCEDURE — 20610 DRAIN/INJ JOINT/BURSA W/O US: CPT | Mod: 50,S$GLB,,

## 2023-10-04 PROCEDURE — 99499 UNLISTED E&M SERVICE: CPT | Mod: S$GLB,,, | Performed by: NURSE PRACTITIONER

## 2023-10-04 PROCEDURE — 99999 PR PBB SHADOW E&M-EST. PATIENT-LVL I: CPT | Mod: PBBFAC,,, | Performed by: NURSE PRACTITIONER

## 2023-10-04 PROCEDURE — 99999 PR PBB SHADOW E&M-EST. PATIENT-LVL I: ICD-10-PCS | Mod: PBBFAC,,, | Performed by: NURSE PRACTITIONER

## 2023-10-04 PROCEDURE — 99499 NO LOS: ICD-10-PCS | Mod: S$GLB,,, | Performed by: NURSE PRACTITIONER

## 2023-10-04 PROCEDURE — 20610 PR DRAIN/INJECT LARGE JOINT/BURSA: ICD-10-PCS | Mod: 50,S$GLB,,

## 2023-10-04 NOTE — PROGRESS NOTES
Buffy Morin presents to clinic today for the second bilateral knee Euflexxa injection.     Exam demonstrates the no effusion in the bilateral knee, and the skin is intact.    Radiographs show degenerative changes of knee    Diagnosis: primary osteoarthritis of both knees     After time out was performed and patient ID, side, and site were verified, the right knee and the left knee were sterilly prepped in the standard fashion.  A 22-gauge needle was introduced into the right knee and the left knee joint from an juan-lateral site without complication. The right knee and the left knee were then injected with 2 ml of Euflexxa each. Sterile dressing was applied.  The patient was instructed to resume activities as tolerated and to call with any problems.     Patient will return next week for the third injection.

## 2023-10-09 ENCOUNTER — CLINICAL SUPPORT (OUTPATIENT)
Dept: REHABILITATION | Facility: HOSPITAL | Age: 63
End: 2023-10-09
Payer: COMMERCIAL

## 2023-10-09 DIAGNOSIS — M25.512 BILATERAL SHOULDER PAIN, UNSPECIFIED CHRONICITY: Primary | ICD-10-CM

## 2023-10-09 DIAGNOSIS — M25.511 BILATERAL SHOULDER PAIN, UNSPECIFIED CHRONICITY: Primary | ICD-10-CM

## 2023-10-09 PROCEDURE — 97112 NEUROMUSCULAR REEDUCATION: CPT

## 2023-10-09 NOTE — PROGRESS NOTES
"[facility]   Physical Therapy Treatment Note      Name: Buffy Morin  Clinic Number: [id number]    Therapy Diagnosis:   Encounter Diagnosis   Name Primary?    Bilateral shoulder pain, unspecified chronicity Yes     Physician: Mandi Cooney MD    Visit Date: 10/9/2023    Physician Orders: PT Eval and Treat   Medical Diagnosis from Referral: M54.12 (ICD-10-CM) - Cervical radiculopathy  Evaluation Date: 10/2/2023  Authorization Period Expiration: 09/19/2024  Plan of Care Expiration: 12/2/2023  Progress Note Due: 11/2/2023  Visit # / Visits authorized: 1/ 1; 1/20  FOTO: 1/3     Precautions: Standard and HBP      Time In: 1600  Time Out: 1700  Total Appointment Time (timed & untimed codes): 60 minutes    Subjective     Pt reports: Has been diligent with her HEP, but L shoulder is feeling sore today.    She was compliant with home exercise program.  Response to previous treatment: HEP compliance  Functional change: ongoing    Pain: 4/10  Location: chaz shoulders    Objective      Objective Measures updated at progress report unless specified.     Strength at IE:  Shoulder Right Left   Supraspinatus Full Can 3/5 3/5   ER 3/5 3/5   IR 4-/5 4-/5   Middle Trap 3/5 3/5   Low Trap 3-/5 3-/5       Treatment     Buffy received the treatments listed below:      neuromuscular re-education activities to improve: Balance, Coordination, Kinesthetic, Sense, Proprioception, and Posture for 60 minutes. The following activities were included:     Patient Education:  - impairments  - POC timelines 4-6 weeks  - cuing for form  - HEP    Row w/o resistance [id number] sec - HEP  W w/o resistance [id number] sec  Serratus wall V RTB 3x10x5 sec - HEP  Serratus wall clock to 12" w/ RTB 3x10 ea chaz  Handcuff lift to 90 deg RTB 3x10 - back on wall  Handcuff lift to >90 deg RTB 3x10 - back on wall  RTB No money 3x20 reps       NEXT VISIT - half FR laying if abhilash for LBP      Held:  Pec inhibition at doorway 3x30 " sec      Patient Education and Home Exercises       Education provided:   - see above    Written Home Exercises Provided: yes. Exercises were reviewed and Buffy was able to demonstrate them prior to the end of the session.  Buffy demonstrated good  understanding of the education provided. See EMR under Patient Instructions for exercises provided during therapy sessions    Assessment     Patient primary impairments cont to be postural deficits of scapulae, decreased C/S and shoulder ROM, and + shoulder impingement testing.    Upon review of HEP, patient performing shoulder elevation instead of scapular retraction with W's likely leading to increased L shoulder discomfort. Patient discomfort reduced following interventions focused on scapular postural retraining; Requires VC and TC for this sloane retraction. Patient impingement testing also improved following interventions today, but cont to have + testing.    Cont to progress as able.    Buffy Is progressing well towards her goals.   Pt prognosis is Good.     Pt will continue to benefit from skilled outpatient physical therapy to address the deficits listed in the problem list box on initial evaluation, provide pt/family education and to maximize pt's level of independence in the home and community environment.     Pt's spiritual, cultural and educational needs considered and pt agreeable to plan of care and goals.     Anticipated barriers to physical therapy: age / job duties    GOALS: Short Term Goals: 0-3 weeks  1.Report decreased shoulder pain  <   / =  4  /10 at worst to increase tolerance for ADLs  2. Increase cervical ROM by 5-10 degrees in order to perform ADLs with decreased difficulty.  3. Increase strength in B shoulders/scapular stabilizers by 1/3 MMT grade to increase tolerance for ADL and work activities.  4. Pt to tolerate HEP to improve ROM and independence with ADL's     Long Term Goals: 4-6 weeks  1.Report decreased shoulder pain  <   / =  2  /10  at worst to increase tolerance for ADLs  2. Negative impingement testing  3.Increased strength in B shoulders/scapular stabilizers to >/= 4/5 MMT grade to increase tolerance for ADL and work activities.  4. Pt will have improved gcode of CJ (20-40% limited) on FOTO neck score for neck pain disability in order to demonstrate true functional improvement.    Plan     Plan of care Certification: 10/2/2023 to 12/2/2023.     Outpatient Physical Therapy 2 times weekly for 4-6 weeks to include the following interventions: Manual Therapy, Moist Heat/ Ice, Neuromuscular Re-ed, Patient Education, Self Care, Therapeutic Activities, and Therapeutic Exercise.     SANTOS ASHER, PT, DPT, OCS

## 2023-10-13 ENCOUNTER — OFFICE VISIT (OUTPATIENT)
Dept: ORTHOPEDICS | Facility: CLINIC | Age: 63
End: 2023-10-13
Payer: COMMERCIAL

## 2023-10-13 DIAGNOSIS — M17.0 BILATERAL PRIMARY OSTEOARTHRITIS OF KNEE: Primary | ICD-10-CM

## 2023-10-13 PROCEDURE — 20610 PR DRAIN/INJECT LARGE JOINT/BURSA: ICD-10-PCS | Mod: 50,S$GLB,, | Performed by: PHYSICIAN ASSISTANT

## 2023-10-13 PROCEDURE — 99499 UNLISTED E&M SERVICE: CPT | Mod: S$GLB,,, | Performed by: PHYSICIAN ASSISTANT

## 2023-10-13 PROCEDURE — 20610 DRAIN/INJ JOINT/BURSA W/O US: CPT | Mod: 50,S$GLB,, | Performed by: PHYSICIAN ASSISTANT

## 2023-10-13 PROCEDURE — 99999 PR PBB SHADOW E&M-EST. PATIENT-LVL III: ICD-10-PCS | Mod: PBBFAC,,, | Performed by: PHYSICIAN ASSISTANT

## 2023-10-13 PROCEDURE — 99499 NO LOS: ICD-10-PCS | Mod: S$GLB,,, | Performed by: PHYSICIAN ASSISTANT

## 2023-10-13 PROCEDURE — 99999 PR PBB SHADOW E&M-EST. PATIENT-LVL III: CPT | Mod: PBBFAC,,, | Performed by: PHYSICIAN ASSISTANT

## 2023-10-13 NOTE — PROGRESS NOTES
Buffy Morin is a 62 y.o. year old her here today for her 3rd Euflexxa injection for degenerative changes of her bilateral knee . she was last seen and treated in the clinic on 9/27/2023. There has been no significant change in her medical status since her last visit. No Fever, chills, malaise, or unexplained weight change.      Allergies, Medications, past medical and surgical history were reviewed .    Examination of the knee demonstrates  No evidence of edema, erythema , echymosis strength and range of motion are unchanged from previous visit.    The risks, benefits, pros, cons, and potential side effects of the procedure were discussed with the patient in detail all questions were answered.  The patient is comfortable and willing to proceed with the procedure. Verbal consent was obtained and the proper joint was identified by the patient and provider.     The injection site was identified and the skin was prepared with a betadine solution. The  bilateral knee  joint was injected with 2 ml of Euflexxa solution under sterile technique. Buffy Morin tolerated the procedure well, she was advised to rest the knee today, ice and elevation. I may take 3 -6 weeks following the last injection to get the full benefit of the medication.  I will see her back in 6 months. Sooner if he has any problems or concerns.           .     ICD-10-CM ICD-9-CM   1. Bilateral primary osteoarthritis of knee  M17.0 715.16

## 2023-10-16 ENCOUNTER — CLINICAL SUPPORT (OUTPATIENT)
Dept: REHABILITATION | Facility: HOSPITAL | Age: 63
End: 2023-10-16
Payer: COMMERCIAL

## 2023-10-16 DIAGNOSIS — M25.512 BILATERAL SHOULDER PAIN, UNSPECIFIED CHRONICITY: Primary | ICD-10-CM

## 2023-10-16 DIAGNOSIS — M25.511 BILATERAL SHOULDER PAIN, UNSPECIFIED CHRONICITY: Primary | ICD-10-CM

## 2023-10-16 PROCEDURE — 97110 THERAPEUTIC EXERCISES: CPT

## 2023-10-16 PROCEDURE — 97112 NEUROMUSCULAR REEDUCATION: CPT

## 2023-10-16 NOTE — PROGRESS NOTES
"[facility]   Physical Therapy Treatment Note      Name: Buffy Morin  Clinic Number: [id number]    Therapy Diagnosis:   Encounter Diagnosis   Name Primary?    Bilateral shoulder pain, unspecified chronicity Yes     Physician: Mandi Cooney MD    Visit Date: 10/16/2023    Physician Orders: PT Eval and Treat   Medical Diagnosis from Referral: M54.12 (ICD-10-CM) - Cervical radiculopathy  Evaluation Date: 10/2/2023  Authorization Period Expiration: 09/19/2024  Plan of Care Expiration: 12/2/2023  Progress Note Due: 11/2/2023  Visit # / Visits authorized: 1/ 1; 1/20   FOTO: 1/3     Precautions: Standard and HBP      Time In: 1505  Time Out: 1600  Total Appointment Time (timed & untimed codes): 55 minutes    Subjective     Pt reports: R shoulder feeling good. L shoulder improving.    FOTO IE - 58%  FOTO 10/16/23 - 60%  FOTO Goal - 70%    She was compliant with home exercise program.  Response to previous treatment: HEP compliance  Functional change: ongoing    Pain: 4/10  Location: chaz shoulders    Objective      Objective Measures updated at progress report unless specified.     Strength at IE:  Shoulder Right Left   Supraspinatus Full Can 3/5 3/5   ER 3/5 3/5   IR 4-/5 4-/5   Middle Trap 3/5 3/5   Low Trap 3-/5 3-/5       Treatment     Buffy received the treatments listed below:      Buffy received therapeutic exercises to develop strength, endurance, ROM, flexibility, posture, and core stabilization for 08 minutes including:    UBE 4'/4' fwd/bwd for UE ROM and light endurance    neuromuscular re-education activities to improve: Balance, Coordination, Kinesthetic, Sense, Proprioception, and Posture for 47 minutes. The following activities were included:     Patient Education:  - impairments  - POC timelines 4-6 weeks  - cuing for form  - HEP    Row RTB 5x10  A RTB 5x10  T RTB 3x10  Seated T RTB 3x10  Serratus wall V RTB 5x10x5 sec - HEP  Serratus wall clock to 12" w/ RTB 5x10 ea " chaz       NEXT VISIT - handcuff lifts; RC activation      Held:  Row w/o resistance [id number] sec - HEP  W w/o resistance [id number] sec  Handcuff lift to 90 deg RTB 3x10 - back on wall  Handcuff lift to >90 deg RTB 3x10 - back on wall  RTB No money 3x20 reps      Patient Education and Home Exercises       Education provided:   - see above    Written Home Exercises Provided: yes. Exercises were reviewed and Buffy was able to demonstrate them prior to the end of the session.  Buffy demonstrated good  understanding of the education provided. See EMR under Patient Instructions for exercises provided during therapy sessions    Assessment     Min changes to FOTO scores, but patient subjectively reports cont improvement in symptoms over time.    Patient primary impairments cont to be postural deficits of scapulae, decreased C/S and shoulder ROM, and + shoulder impingement testing. Impingement testing cont to improve with today patient exhibiting - testing on R shoulder and mild + on L. Scapular awareness also continues to improve over time.     Cont to progress as able.    Buffy Is progressing well towards her goals.   Pt prognosis is Good.     Pt will continue to benefit from skilled outpatient physical therapy to address the deficits listed in the problem list box on initial evaluation, provide pt/family education and to maximize pt's level of independence in the home and community environment.     Pt's spiritual, cultural and educational needs considered and pt agreeable to plan of care and goals.     Anticipated barriers to physical therapy: age / job duties    GOALS: Short Term Goals: 0-3 weeks  1.Report decreased shoulder pain  <   / =  4  /10 at worst to increase tolerance for ADLs  2. Increase cervical ROM by 5-10 degrees in order to perform ADLs with decreased difficulty.  3. Increase strength in B shoulders/scapular stabilizers by 1/3 MMT grade to increase tolerance for ADL and work activities.  4. Pt to tolerate  HEP to improve ROM and independence with ADL's     Long Term Goals: 4-6 weeks  1.Report decreased shoulder pain  <   / =  2  /10 at worst to increase tolerance for ADLs  2. Negative impingement testing  3.Increased strength in B shoulders/scapular stabilizers to >/= 4/5 MMT grade to increase tolerance for ADL and work activities.  4. Pt will have improved gcode of CJ (20-40% limited) on FOTO neck score for neck pain disability in order to demonstrate true functional improvement.    Plan     Plan of care Certification: 10/2/2023 to 12/2/2023.     Outpatient Physical Therapy 2 times weekly for 4-6 weeks to include the following interventions: Manual Therapy, Moist Heat/ Ice, Neuromuscular Re-ed, Patient Education, Self Care, Therapeutic Activities, and Therapeutic Exercise.     SANTOS ASHER, PT, DPT, OCS

## 2023-10-23 ENCOUNTER — CLINICAL SUPPORT (OUTPATIENT)
Dept: REHABILITATION | Facility: HOSPITAL | Age: 63
End: 2023-10-23
Payer: COMMERCIAL

## 2023-10-23 DIAGNOSIS — M25.512 BILATERAL SHOULDER PAIN, UNSPECIFIED CHRONICITY: Primary | ICD-10-CM

## 2023-10-23 DIAGNOSIS — M25.511 BILATERAL SHOULDER PAIN, UNSPECIFIED CHRONICITY: Primary | ICD-10-CM

## 2023-10-23 PROCEDURE — 97110 THERAPEUTIC EXERCISES: CPT

## 2023-10-23 PROCEDURE — 97112 NEUROMUSCULAR REEDUCATION: CPT

## 2023-10-23 NOTE — PROGRESS NOTES
"[facility]   Physical Therapy Treatment Note      Name: Buffy Morin  Clinic Number: [id number]    Therapy Diagnosis:   Encounter Diagnosis   Name Primary?    Bilateral shoulder pain, unspecified chronicity Yes       Physician: Madni Cooney MD    Visit Date: 10/23/2023    Physician Orders: PT Eval and Treat   Medical Diagnosis from Referral: M54.12 (ICD-10-CM) - Cervical radiculopathy  Evaluation Date: 10/2/2023  Authorization Period Expiration: 09/19/2024  Plan of Care Expiration: 12/2/2023  Progress Note Due: 11/2/2023  Visit # / Visits authorized: 1/ 1; 3/20   FOTO: 1/3     Precautions: Standard and HBP      Time In: 1500  Time Out: 1600  Total Appointment Time (timed & untimed codes): 60 minutes    Subjective     Pt reports: No significant complaints today.    FOTO IE - 58%  FOTO 10/16/23 - 60%  FOTO Goal - 70%    She was compliant with home exercise program.  Response to previous treatment: HEP compliance  Functional change: ongoing    Pain: 4/10  Location: chaz shoulders    Objective      Objective Measures updated at progress report unless specified.     Strength at IE:  Shoulder Right Left   Supraspinatus Full Can 3/5 3/5   ER 3/5 3/5   IR 4-/5 4-/5   Middle Trap 3/5 3/5   Low Trap 3-/5 3-/5       Treatment     Buffy received the treatments listed below:      Buffy received therapeutic exercises to develop strength, endurance, ROM, flexibility, posture, and core stabilization for 12 minutes including:    UBE 6'/6' fwd/bwd Lvl 4.0 for UE ROM and light endurance    neuromuscular re-education activities to improve: Balance, Coordination, Kinesthetic, Sense, Proprioception, and Posture for 48 minutes. The following activities were included:     Patient Education:  - impairments  - POC timelines 4-6 weeks  - cuing for form  - HEP      Row GTB 5x10  A GTB 5x10  T GTB 5x10  Serratus wall V GTB 5x10x5 sec - HEP  Serratus wall clock to 12" w/ GTB 5x10 ea chaz  Handcuff lift to 90 " deg GTB 5x10 - back on wall  Handcuff lift to >90 deg GTB 5x10 - back on wall       NEXT VISIT - cont handcuff lifts; RC activation      Held:  Row w/o resistance [id number] sec - HEP  W w/o resistance [id number] sec  Seated T RTB 3x10  RTB No money 3x20 reps      Patient Education and Home Exercises       Education provided:   - see above    Written Home Exercises Provided: yes. Exercises were reviewed and Buffy was able to demonstrate them prior to the end of the session.  Buffy demonstrated good  understanding of the education provided. See EMR under Patient Instructions for exercises provided during therapy sessions    Assessment     Cont subjective reports of improvement and negative impingement testing today chaz. Darian increased challenge well and w/o complaint suggestive of improving NM activation and strength.    Cont to progress as able.    Buffy Is progressing well towards her goals.   Pt prognosis is Good.     Pt will continue to benefit from skilled outpatient physical therapy to address the deficits listed in the problem list box on initial evaluation, provide pt/family education and to maximize pt's level of independence in the home and community environment.     Pt's spiritual, cultural and educational needs considered and pt agreeable to plan of care and goals.     Anticipated barriers to physical therapy: age / job duties    GOALS: Short Term Goals: 0-3 weeks  1.Report decreased shoulder pain  <   / =  4  /10 at worst to increase tolerance for ADLs  2. Increase cervical ROM by 5-10 degrees in order to perform ADLs with decreased difficulty.  3. Increase strength in B shoulders/scapular stabilizers by 1/3 MMT grade to increase tolerance for ADL and work activities.  4. Pt to tolerate HEP to improve ROM and independence with ADL's     Long Term Goals: 4-6 weeks  1.Report decreased shoulder pain  <   / =  2  /10 at worst to increase tolerance for ADLs  2. Negative impingement testing  3.Increased strength  in B shoulders/scapular stabilizers to >/= 4/5 MMT grade to increase tolerance for ADL and work activities.  4. Pt will have improved gcode of CJ (20-40% limited) on FOTO neck score for neck pain disability in order to demonstrate true functional improvement.    Plan     Plan of care Certification: 10/2/2023 to 12/2/2023.     Outpatient Physical Therapy 2 times weekly for 4-6 weeks to include the following interventions: Manual Therapy, Moist Heat/ Ice, Neuromuscular Re-ed, Patient Education, Self Care, Therapeutic Activities, and Therapeutic Exercise.     SANTOS ASHER, PT, DPT, OCS

## 2023-10-30 ENCOUNTER — CLINICAL SUPPORT (OUTPATIENT)
Dept: REHABILITATION | Facility: HOSPITAL | Age: 63
End: 2023-10-30
Payer: COMMERCIAL

## 2023-10-30 DIAGNOSIS — M25.511 BILATERAL SHOULDER PAIN, UNSPECIFIED CHRONICITY: Primary | ICD-10-CM

## 2023-10-30 DIAGNOSIS — M25.512 BILATERAL SHOULDER PAIN, UNSPECIFIED CHRONICITY: Primary | ICD-10-CM

## 2023-10-30 PROCEDURE — 97112 NEUROMUSCULAR REEDUCATION: CPT

## 2023-10-30 PROCEDURE — 97110 THERAPEUTIC EXERCISES: CPT

## 2023-10-30 NOTE — PROGRESS NOTES
[facility]   Physical Therapy Treatment Note      Name: Buffy Morin  Clinic Number: [id number]    Therapy Diagnosis:   Encounter Diagnosis   Name Primary?    Bilateral shoulder pain, unspecified chronicity Yes     Physician: Mandi Cooney MD    Visit Date: 10/30/2023    Physician Orders: PT Eval and Treat   Medical Diagnosis from Referral: M54.12 (ICD-10-CM) - Cervical radiculopathy  Evaluation Date: 10/2/2023  Authorization Period Expiration: 09/19/2024  Plan of Care Expiration: 12/2/2023  Progress Note Due: 11/2/2023  Visit # / Visits authorized: 1/ 1; 4/20   FOTO: 1/3     Precautions: Standard and HBP      Time In: 1450  Time Out: 1545  Total Appointment Time (timed & untimed codes): 55 minutes    Subjective     Pt reports: No complaints. Shoulders feels like they continues to improve over time and with HEP.    FOTO IE - 58%  FOTO 10/16/23 - 60%  FOTO 10/30/23 - 65%  FOTO Goal - 70%    She was compliant with home exercise program.  Response to previous treatment: HEP compliance  Functional change: ongoing    Pain: 4/10  Location: chaz shoulders    Objective      Objective Measures updated at progress report unless specified.     Strength at IE -> 10/30:  Shoulder Right Left   Supraspinatus Full Can 3/5 -> 4-/5 3/5  -> 4-/5   ER 3/5  -> 4+/5 3/5  -> 4+/5   IR 4-/5  -> 5/5 4-/5  -> 5/5   Middle Trap 3/5 3/5   Low Trap 3-/5 3-/5       Treatment     Buffy received the treatments listed below:      Buffy received therapeutic exercises to develop strength, endurance, ROM, flexibility, posture, and core stabilization for 14 minutes including:    UBE 7'/7' fwd/bwd Lvl 4.0 for UE ROM and light endurance    neuromuscular re-education activities to improve: Balance, Coordination, Kinesthetic, Sense, Proprioception, and Posture for 41 minutes. The following activities were included:     Patient Education:  - impairments  - POC timelines 4-6 weeks  - cuing for form  - HEP    Serratus wall  "clock to 12" w/ GTB 3x15 ea chaz  Serratus wall clock to 9 and 3" w/ GTB 3x15 ea chaz  Handcuff lift to 90 deg GTB 3x15 - back on wall  Handcuff lift to >90 deg GTB 3x15 - back on wall  Row BTB 5x10    Scaption 1lbs 10x10 sec  Abduction 1lbs 10x10 sec  Flexion 1lbs 10x10 sec     NEXT VISIT - cont handcuff lifts; RC activation      Held:  A GTB 5x10  T GTB 5x10  Serratus wall V GTB 5x10x5 sec - HEP  Seated T RTB 3x10  RTB No money 3x20 reps      Patient Education and Home Exercises       Education provided:   - see above    Written Home Exercises Provided: yes. Exercises were reviewed and Buffy was able to demonstrate them prior to the end of the session.  Buffy demonstrated good  understanding of the education provided. See EMR under Patient Instructions for exercises provided during therapy sessions    Assessment     Patient progressing well. Objectively demonstrates improving RC strength and cont to abhilash increased challenge well within visits. Subjectively cont to report reduced symptoms.     Cont to progress as able.    Buffy Is progressing well towards her goals.   Pt prognosis is Good.     Pt will continue to benefit from skilled outpatient physical therapy to address the deficits listed in the problem list box on initial evaluation, provide pt/family education and to maximize pt's level of independence in the home and community environment.     Pt's spiritual, cultural and educational needs considered and pt agreeable to plan of care and goals.     Anticipated barriers to physical therapy: age / job duties    GOALS: Short Term Goals: 0-3 weeks  1.Report decreased shoulder pain  <   / =  4  /10 at worst to increase tolerance for ADLs  2. Increase cervical ROM by 5-10 degrees in order to perform ADLs with decreased difficulty.  3. Increase strength in B shoulders/scapular stabilizers by 1/3 MMT grade to increase tolerance for ADL and work activities.  4. Pt to tolerate HEP to improve ROM and independence with " ADL's     Long Term Goals: 4-6 weeks  1.Report decreased shoulder pain  <   / =  2  /10 at worst to increase tolerance for ADLs  2. Negative impingement testing  3.Increased strength in B shoulders/scapular stabilizers to >/= 4/5 MMT grade to increase tolerance for ADL and work activities.  4. Pt will have improved gcode of CJ (20-40% limited) on FOTO neck score for neck pain disability in order to demonstrate true functional improvement.    Plan     Plan of care Certification: 10/2/2023 to 12/2/2023.     Outpatient Physical Therapy 2 times weekly for 4-6 weeks to include the following interventions: Manual Therapy, Moist Heat/ Ice, Neuromuscular Re-ed, Patient Education, Self Care, Therapeutic Activities, and Therapeutic Exercise.     SANTOS ASHER, PT, DPT, OCS

## 2023-10-31 ENCOUNTER — TELEPHONE (OUTPATIENT)
Dept: OTOLARYNGOLOGY | Facility: CLINIC | Age: 63
End: 2023-10-31
Payer: COMMERCIAL

## 2023-10-31 DIAGNOSIS — I10 ESSENTIAL HYPERTENSION: ICD-10-CM

## 2023-10-31 RX ORDER — AMLODIPINE BESYLATE 10 MG/1
TABLET ORAL
Qty: 90 TABLET | Refills: 1 | Status: SHIPPED | OUTPATIENT
Start: 2023-10-31

## 2023-10-31 NOTE — TELEPHONE ENCOUNTER
Refill Decision Note   Buffy Morin  is requesting a refill authorization.  Brief Assessment and Rationale for Refill:  Approve     Medication Therapy Plan:         Comments:     Note composed:3:53 PM 10/31/2023             Appointments     Last Visit   4/24/2023 Minoo Carrasquillo MD   Next Visit   Visit date not found Minoo Carrasquillo MD

## 2023-10-31 NOTE — TELEPHONE ENCOUNTER
No care due was identified.  [facility] Embedded Care Due Messages. Reference number: [id number].   10/31/2023 2:44:17 PM CDT

## 2023-11-02 DIAGNOSIS — Z78.0 MENOPAUSE: ICD-10-CM

## 2023-11-06 ENCOUNTER — CLINICAL SUPPORT (OUTPATIENT)
Dept: REHABILITATION | Facility: HOSPITAL | Age: 63
End: 2023-11-06
Payer: COMMERCIAL

## 2023-11-06 DIAGNOSIS — M25.511 BILATERAL SHOULDER PAIN, UNSPECIFIED CHRONICITY: Primary | ICD-10-CM

## 2023-11-06 DIAGNOSIS — M25.512 BILATERAL SHOULDER PAIN, UNSPECIFIED CHRONICITY: Primary | ICD-10-CM

## 2023-11-06 PROCEDURE — 97110 THERAPEUTIC EXERCISES: CPT

## 2023-11-06 PROCEDURE — 97112 NEUROMUSCULAR REEDUCATION: CPT

## 2023-11-06 NOTE — PROGRESS NOTES
[facility]   Physical Therapy Treatment Note      Name: Buffy Morin  Clinic Number: [id number]    Therapy Diagnosis:   Encounter Diagnosis   Name Primary?    Bilateral shoulder pain, unspecified chronicity Yes     Physician: Mandi Cooney MD    Visit Date: 11/6/2023    Physician Orders: PT Eval and Treat   Medical Diagnosis from Referral: M54.12 (ICD-10-CM) - Cervical radiculopathy  Evaluation Date: 10/2/2023  Authorization Period Expiration: 09/19/2024  Plan of Care Expiration: 12/2/2023  Progress Note Due: 11/2/2023  Visit # / Visits authorized: 1/ 1; 5/20   FOTO: 1/3     Precautions: Standard and HBP      Time In: 1450   Time Out: 1545  Total Appointment Time (timed & untimed codes): 55 minutes    Subjective     Pt reports: FU with pain management on the 8th. No complaints and shoulders feel like they cont to improve.    FOTO IE - 58%  FOTO 10/16/23 - 60%  FOTO 10/30/23 - 65%  FOTO Goal - 70%    She was compliant with home exercise program.  Response to previous treatment: HEP compliance  Functional change: ongoing    Pain: 4/10  Location: chaz shoulders    Objective      Objective Measures updated at progress report unless specified.     Strength at IE -> 10/30:  Shoulder Right Left   Supraspinatus Full Can 3/5 -> 4-/5 3/5  -> 4-/5   ER 3/5  -> 4+/5 3/5  -> 4+/5   IR 4-/5  -> 5/5 4-/5  -> 5/5   Middle Trap 3/5 3/5   Low Trap 3-/5 3-/5       Treatment     Buffy received the treatments listed below:      Buffy received therapeutic exercises to develop strength, endurance, ROM, flexibility, posture, and core stabilization for 16 minutes including:    UBE 8'/8' fwd/bwd Lvl 4.0 for UE ROM and light endurance    neuromuscular re-education activities to improve: Balance, Coordination, Kinesthetic, Sense, Proprioception, and Posture for 39 minutes. The following activities were included:     Patient Education:  - cont HEP    Scaption 1lbs 15x10 sec  Abduction 1lbs 15x10 sec  Flexion  "1lbs 15x10 sec    Row BTB 5x10  ER walkout RTB 5x10 ea chaz    Serratus wall clock to 12" w/ BTB 3x10 ea chaz  Serratus wall clock to 9 and 3" w/ BTB 3x10 ea chaz    Serratus wall V GTB 5x10x5 sec - HEP - NT  Handcuff lift to 90 deg GTB 3x15 - back on wall - NT  Handcuff lift to >90 deg GTB 3x15 - back on wall - NT      Patient Education and Home Exercises       Education provided:   - see above    Written Home Exercises Provided: yes. Exercises were reviewed and Buffy was able to demonstrate them prior to the end of the session.  Buffy demonstrated good  understanding of the education provided. See EMR under Patient Instructions for exercises provided during therapy sessions    Assessment     Abhilash all interventions well and abhilash increased challenge well indicative of cont improvements in strength. Functionally has progressed well since start of care.     Possible DC at next visit.    Buffy Is progressing well towards her goals.   Pt prognosis is Good.     Pt will continue to benefit from skilled outpatient physical therapy to address the deficits listed in the problem list box on initial evaluation, provide pt/family education and to maximize pt's level of independence in the home and community environment.     Pt's spiritual, cultural and educational needs considered and pt agreeable to plan of care and goals.     Anticipated barriers to physical therapy: age / job duties    GOALS: Short Term Goals: 0-3 weeks  1.Report decreased shoulder pain  <   / =  4  /10 at worst to increase tolerance for ADLs  2. Increase cervical ROM by 5-10 degrees in order to perform ADLs with decreased difficulty.  3. Increase strength in B shoulders/scapular stabilizers by 1/3 MMT grade to increase tolerance for ADL and work activities.  4. Pt to tolerate HEP to improve ROM and independence with ADL's     Long Term Goals: 4-6 weeks  1.Report decreased shoulder pain  <   / =  2  /10 at worst to increase tolerance for ADLs  2. Negative " impingement testing  3.Increased strength in B shoulders/scapular stabilizers to >/= 4/5 MMT grade to increase tolerance for ADL and work activities.  4. Pt will have improved gcode of CJ (20-40% limited) on FOTO neck score for neck pain disability in order to demonstrate true functional improvement.    Plan     Plan of care Certification: 10/2/2023 to 12/2/2023.     Outpatient Physical Therapy 2 times weekly for 4-6 weeks to include the following interventions: Manual Therapy, Moist Heat/ Ice, Neuromuscular Re-ed, Patient Education, Self Care, Therapeutic Activities, and Therapeutic Exercise.     SANTOS ASHER, PT, DPT, OCS

## 2023-11-08 ENCOUNTER — OFFICE VISIT (OUTPATIENT)
Dept: PAIN MEDICINE | Facility: CLINIC | Age: 63
End: 2023-11-08
Payer: COMMERCIAL

## 2023-11-08 VITALS
HEIGHT: 62 IN | HEART RATE: 74 BPM | SYSTOLIC BLOOD PRESSURE: 125 MMHG | TEMPERATURE: 98 F | WEIGHT: 174.19 LBS | OXYGEN SATURATION: 100 % | BODY MASS INDEX: 32.05 KG/M2 | RESPIRATION RATE: 18 BRPM | DIASTOLIC BLOOD PRESSURE: 75 MMHG

## 2023-11-08 DIAGNOSIS — M54.12 CERVICAL RADICULOPATHY: Primary | ICD-10-CM

## 2023-11-08 DIAGNOSIS — R52 PAIN: ICD-10-CM

## 2023-11-08 DIAGNOSIS — G89.29 CHRONIC PAIN OF BOTH SHOULDERS: ICD-10-CM

## 2023-11-08 DIAGNOSIS — M25.512 CHRONIC PAIN OF BOTH SHOULDERS: ICD-10-CM

## 2023-11-08 DIAGNOSIS — M25.511 CHRONIC PAIN OF BOTH SHOULDERS: ICD-10-CM

## 2023-11-08 PROCEDURE — 1101F PT FALLS ASSESS-DOCD LE1/YR: CPT | Mod: CPTII,S$GLB,, | Performed by: NURSE PRACTITIONER

## 2023-11-08 PROCEDURE — 1160F PR REVIEW ALL MEDS BY PRESCRIBER/CLIN PHARMACIST DOCUMENTED: ICD-10-PCS | Mod: CPTII,S$GLB,, | Performed by: NURSE PRACTITIONER

## 2023-11-08 PROCEDURE — 1160F RVW MEDS BY RX/DR IN RCRD: CPT | Mod: CPTII,S$GLB,, | Performed by: NURSE PRACTITIONER

## 2023-11-08 PROCEDURE — 99999 PR PBB SHADOW E&M-EST. PATIENT-LVL IV: CPT | Mod: PBBFAC,,, | Performed by: NURSE PRACTITIONER

## 2023-11-08 PROCEDURE — 3074F PR MOST RECENT SYSTOLIC BLOOD PRESSURE < 130 MM HG: ICD-10-PCS | Mod: CPTII,S$GLB,, | Performed by: NURSE PRACTITIONER

## 2023-11-08 PROCEDURE — 3078F DIAST BP <80 MM HG: CPT | Mod: CPTII,S$GLB,, | Performed by: NURSE PRACTITIONER

## 2023-11-08 PROCEDURE — 3288F FALL RISK ASSESSMENT DOCD: CPT | Mod: CPTII,S$GLB,, | Performed by: NURSE PRACTITIONER

## 2023-11-08 PROCEDURE — 99999 PR PBB SHADOW E&M-EST. PATIENT-LVL IV: ICD-10-PCS | Mod: PBBFAC,,, | Performed by: NURSE PRACTITIONER

## 2023-11-08 PROCEDURE — 99214 PR OFFICE/OUTPT VISIT, EST, LEVL IV, 30-39 MIN: ICD-10-PCS | Mod: S$GLB,,, | Performed by: NURSE PRACTITIONER

## 2023-11-08 PROCEDURE — 1159F PR MEDICATION LIST DOCUMENTED IN MEDICAL RECORD: ICD-10-PCS | Mod: CPTII,S$GLB,, | Performed by: NURSE PRACTITIONER

## 2023-11-08 PROCEDURE — 3044F PR MOST RECENT HEMOGLOBIN A1C LEVEL <7.0%: ICD-10-PCS | Mod: CPTII,S$GLB,, | Performed by: NURSE PRACTITIONER

## 2023-11-08 PROCEDURE — 3078F PR MOST RECENT DIASTOLIC BLOOD PRESSURE < 80 MM HG: ICD-10-PCS | Mod: CPTII,S$GLB,, | Performed by: NURSE PRACTITIONER

## 2023-11-08 PROCEDURE — 3074F SYST BP LT 130 MM HG: CPT | Mod: CPTII,S$GLB,, | Performed by: NURSE PRACTITIONER

## 2023-11-08 PROCEDURE — 3288F PR FALLS RISK ASSESSMENT DOCUMENTED: ICD-10-PCS | Mod: CPTII,S$GLB,, | Performed by: NURSE PRACTITIONER

## 2023-11-08 PROCEDURE — 3008F BODY MASS INDEX DOCD: CPT | Mod: CPTII,S$GLB,, | Performed by: NURSE PRACTITIONER

## 2023-11-08 PROCEDURE — 3008F PR BODY MASS INDEX (BMI) DOCUMENTED: ICD-10-PCS | Mod: CPTII,S$GLB,, | Performed by: NURSE PRACTITIONER

## 2023-11-08 PROCEDURE — 1101F PR PT FALLS ASSESS DOC 0-1 FALLS W/OUT INJ PAST YR: ICD-10-PCS | Mod: CPTII,S$GLB,, | Performed by: NURSE PRACTITIONER

## 2023-11-08 PROCEDURE — 1159F MED LIST DOCD IN RCRD: CPT | Mod: CPTII,S$GLB,, | Performed by: NURSE PRACTITIONER

## 2023-11-08 PROCEDURE — 3044F HG A1C LEVEL LT 7.0%: CPT | Mod: CPTII,S$GLB,, | Performed by: NURSE PRACTITIONER

## 2023-11-08 PROCEDURE — 99214 OFFICE O/P EST MOD 30 MIN: CPT | Mod: S$GLB,,, | Performed by: NURSE PRACTITIONER

## 2023-11-08 NOTE — PROGRESS NOTES
PCP: Mnioo Carrasquillo MD    REFERRING PHYSICIAN: No ref. provider found    CHIEF COMPLAINT: Shoulder Pain    Original HISTORY OF PRESENT ILLNESS: Buffy Morin presents to the clinic for the evaluation of the above pain. The pain started approximately 2 months ago.     Original Pain Description:  The pain is located in the shoulders and doesn't radiate. The pain is described as aching. Exacerbating factors: Lifting. Mitigating factors heat. Symptoms interfere with sleep, work. The patient feels like symptoms have been improving. Patient denies night fever/night sweats, urinary incontinence, bowel incontinence, significant weight loss, significant motor weakness, and loss of sensations.    Original PAIN SCORES:  Best: Pain is 4  Worst: Pain is 8  Current: Pain is 4      INTERVAL HISTORY: (Newest visit at the bottom)   Interval History (Date):   11/8/2023- patient presents for a follow up for bilateral shoulder pain that doesn't radiate. She has been attending PT which has helped her pain and improved  her functionality.  She denies any new pain, denies any profound weakness denies any B/B dysfunction.       6 weeks of Conservative therapy:  PT: 2022 (Must include dates)  Chiro: No  HEP: No      Treatments / Medications: (Ice/Heat/NSAIDS/APAP/etc):  Heat - Epsom salts  Ibuprofen   APAP  Meloxicam      Interventional Pain Procedures: (Previous injections)  8/3/22: PELON  12/23/19: B/L L4/5 TFESI    Past Medical History:   Diagnosis Date    Abnormal Pap smear of cervix     Anxiety     At high risk for breast cancer: TC 7 > 20% 2019 2/6/2019    Carpal tunnel syndrome     Elevated blood pressure     Family history of breast cancer in sister 10/1/2013    Hypertension     S/P hysterectomy 9/23/2017     Past Surgical History:   Procedure Laterality Date    BREAST BIOPSY Right     2007    COLONOSCOPY N/A 8/19/2019    Procedure: COLONOSCOPY;  Surgeon: ROHIT Romero MD;  Location: [facility] ([address] [place] [address]);  Service: Endoscopy;   Laterality: N/A;    EPIDURAL STEROID INJECTION INTO CERVICAL SPINE N/A 8/3/2022    Procedure: Injection-steroid-epidural-cervical IL C7-T1;  Surgeon: Justina Hollingsworth MD;  Location: [facility]IN [facility]GT;  Service: Pain Management;  Laterality: N/A;    HYSTERECTOMY      Total    TRANSFORAMINAL EPIDURAL INJECTION OF STEROID Bilateral 12/23/2019    Procedure: LUMBAR TRANSFORAMINAL BILATERAL L4/5 TF JASON;  Surgeon: Hernesto Lazo MD;  Location: [facility]IN [facility];  Service: Pain Management;  Laterality: Bilateral;  NEEDS CONSENT    TRANSFORAMINAL EPIDURAL INJECTION OF STEROID Bilateral 1/16/2020    Procedure: LUMBAR TRANSFORAMINAL BILATERAL L4/5 TF JASON DIRECT REFERRAL;  Surgeon: Pamela Erazo MD;  Location: [facility]IN [facility];  Service: Pain Management;  Laterality: Bilateral;  NEEDS CONSENT    TUBAL LIGATION       Social History     Socioeconomic History    Marital status:     Number of children: 2   Tobacco Use    Smoking status: Never    Smokeless tobacco: Never   Substance and Sexual Activity    Alcohol use: Yes     Comment: rare    Drug use: No    Sexual activity: Yes   Social History Narrative    Oldest son- Joe Carrasquillo is second son with 2 kids- [place]; Akshat 8 months; Aravind age 5 2023 (grandkids)     Social Determinants of Health     Stress: No Stress Concern Present (6/25/2019)    [employer] - Occupational Stress Questionnaire     Feeling of Stress : Only a little     Family History   Problem Relation Age of Onset    Cancer Mother         unknown origin    Hypertension Mother     Hypertension Father     Thyroid disease Sister     Breast cancer Sister         in her mid-60s, unilateral    Depression Sister     Cancer Sister         Breast    Anxiety disorder Sister     No Known Problems Son     No Known Problems Son     Ovarian cancer Neg Hx        Review of patient's allergies indicates:   Allergen Reactions    No known drug allergies        Current Outpatient Medications   Medication  "Sig    amLODIPine (NORVASC) 10 MG tablet TAKE 1 TABLET BY MOUTH EVERY DAY    atorvastatin (LIPITOR) 80 MG tablet TAKE 1 TABLET (80 MG TOTAL) BY MOUTH ONCE DAILY.    cholecalciferol, vitamin D3, (VITAMIN D3) 50 mcg (2,000 unit) Cap Take 1 capsule by mouth Daily. Take vitamin D 2000 units every day.    meloxicam (MOBIC) 15 MG tablet TAKE 1 TABLET BY MOUTH EVERY DAY    meloxicam (MOBIC) 15 MG tablet Take 1 tablet (15 mg total) by mouth once daily.    diclofenac sodium (VOLTAREN) 1 % Gel Apply two grams to affected area 3-4 times as needed (Patient not taking: Reported on 11/8/2023)    EScitalopram oxalate (LEXAPRO) 20 MG tablet TAKE 1 TABLET BY MOUTH EVERY DAY (Patient not taking: Reported on 11/8/2023)    gabapentin (NEURONTIN) 100 MG capsule Take 1 capsule (100 mg total) by mouth 3 (three) times daily. (Patient not taking: Reported on 11/8/2023)    LORazepam (ATIVAN) 0.5 MG tablet Take 1 tablet (0.5 mg total) by mouth every 8 (eight) hours as needed for Anxiety. (Patient not taking: Reported on 11/8/2023)     No current facility-administered medications for this visit.       ROS:  GENERAL: No fever. No chills. No fatigue. Denies weight loss. Denies weight gain.  HEENT: Denies headaches. Denies vision change. Denies eye pain. Denies double vision. Denies ear pain.   CV: Denies chest pain.   PULM: Denies of shortness of breath.  GI: Denies constipation. No diarrhea. No abdominal pain. Denies nausea. Denies vomiting. No blood in stool.  HEME: Denies bleeding problems.  : Denies urgency. No painful urination. No blood in urine.  MS: Denies joint stiffness. Denies joint swelling.  + Neck pain.  SKIN: Denies rash.   NEURO: Denies seizures. No weakness.  PSYCH:  Denies difficulty sleeping. No anxiety. Denies depression. No suicidal thoughts.       VITALS:   Vitals:    11/08/23 1455   BP: 125/75   Pulse: 74   Resp: 18   Temp: 98.2 °F (36.8 °C)   SpO2: 100%   Weight: 79 kg (174 lb 2.6 oz)   Height: 5' 2" (1.575 m)   PainSc: "   2   PainLoc: Shoulder         PHYSICAL EXAM:   GENERAL: Well appearing, in no acute distress, alert and oriented x3.  PSYCH:  Mood and affect appropriate.  SKIN: Skin color, texture, turgor normal, no rashes or lesions.  HEENT:  Normocephalic, atraumatic. Cranial nerves grossly intact.  NECK: Full ROM. No pain to palpation over the cervical paraspinous muscles. No pain to palpation over facets. No pain with neck flexion, extension, or lateral flexion. Pain with palpation of midline cervical spine.   PULM: No evidence of respiratory difficulty, symmetric chest rise.  GI:  Non-distended  BACK: Normal range of motion. No pain to palpation over the spinous processes. No pain to palpation over facet joints. There is no pain with palpation over the sacroiliac joints bilaterally.   EXTREMITIES: No deformities, edema, or skin discoloration.   MUSCULOSKELETAL: Normal shoulder exam. No atrophy is noted.  NEURO: Sensation is equal and appropriate bilaterally. Bilateral upper and lower extremity strength is normal and symmetric. Bilateral upper and lower extremity coordination and muscle stretch reflexes are physiologic and symmetric. Plantar response are downgoing. Straight leg raising in the supine position is negative to radicular pain. Normal Clark's.   GAIT: normal.      LABS:      IMAGING:    MRI CERVICAL SPINE WITHOUT CONTRAST     CLINICAL HISTORY:  Neck pain, chronic, degenerative changes on xray;Myelopathy, acute, cervical spine; Other cervical disc degeneration, unspecified cervical region     TECHNIQUE:  Multiplanar, multisequence MR images of the cervical spine were performed without the administration of contrast.     COMPARISON:  X-ray 04/28/2022     FINDINGS:  Alignment: There is straightening of the cervical lordosis.  Subtle grade 1 anterolisthesis C2 on C3 and C5 on C4.     Vertebrae: Multilevel degenerative endplate sclerosis most severe at C4-5.     Discs: Degenerative findings:     C2-C3:  Circumferential disc bulging and mild facet arthrosis.     C3-C4: Circumferential disc osteophyte complex and mild facet arthrosis.     C4-C5: Circumferential disc osteophyte complex and mild facet arthrosis contribute to moderate central canal stenosis and moderate left and mild right-sided foraminal stenosis.     C5-C6: Circumferential disc osteophyte complex mild facet arthrosis.  No central canal stenosis.  Mild bilateral foraminal stenosis.     C6-C7: Circumferential disc osteophyte complex and facet arthrosis.     C7-T1: Bilateral facet arthrosis.     T1-T2: None     Cord: Normal     Skull base and craniocervical junction: Normal.     Paraspinal muscles & soft tissues: Unremarkable.     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        Electronically signed by: Kwabena Mishra Jr  Date:                                            07/11/2022  Time:                                           09:17    ASSESSMENT: 63 y.o. year old female with pain, consistent with:    No diagnosis found.    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 She comes to us with bilateral shoulder pain which is worst with lifting heavy files at work. Imaging shows moderate canal stenosis at C4/5. Exam shows pain reproduced only with palpation of her low cervical spine. Normal muscle and shoulder exam.      11/8/2023- Buffy Morin is a 63 y.o. female who  has a past medical history of Abnormal Pap smear of cervix, Anxiety, At high risk for breast cancer: TC 7 > 20% 2019 (2/6/2019), Carpal tunnel syndrome, Elevated blood pressure, Family history of breast cancer in sister (10/1/2013), Hypertension, and S/P hysterectomy (9/23/2017).  By history and examination this patient has chronic neck and shoulder  pain without  radiculopathy in the distribution of C4 and C5.   The underlying cause cause is facet arthritis, degenerative disc disease, foraminal stenosis, central canal stenosis, and muscle dysfunction.  Pathology is confirmed by imaging.  We discussed the underlying diagnoses and multiple treatment options including non-opioid medications, interventional procedures, physical therapy, home exercise, activity modification, and weight loss.  The risks and benefits of each treatment option were discussed and all questions were answered.        PLAN:  Complete PT as this has helped her pain  Continue Meloxicam  Follow up 3-4 months or sooner if needed  Could consider a PELON in the future.      Vamshi Guillen, ESMEC  Interventional Pain Management      11/08/2023

## 2023-11-13 ENCOUNTER — CLINICAL SUPPORT (OUTPATIENT)
Dept: REHABILITATION | Facility: HOSPITAL | Age: 63
End: 2023-11-13
Payer: COMMERCIAL

## 2023-11-13 DIAGNOSIS — M25.511 BILATERAL SHOULDER PAIN, UNSPECIFIED CHRONICITY: Primary | ICD-10-CM

## 2023-11-13 DIAGNOSIS — M25.512 BILATERAL SHOULDER PAIN, UNSPECIFIED CHRONICITY: Primary | ICD-10-CM

## 2023-11-13 PROCEDURE — 97112 NEUROMUSCULAR REEDUCATION: CPT

## 2023-11-13 PROCEDURE — 97110 THERAPEUTIC EXERCISES: CPT

## 2023-11-13 NOTE — PROGRESS NOTES
[facility]   Physical Therapy Treatment Note      Name: Buffy Morin  Clinic Number: [id number]    Therapy Diagnosis:   Encounter Diagnosis   Name Primary?    Bilateral shoulder pain, unspecified chronicity Yes       Physician: Mandi Cooney MD    Visit Date: 11/13/2023    Physician Orders: PT Eval and Treat   Medical Diagnosis from Referral: M54.12 (ICD-10-CM) - Cervical radiculopathy  Evaluation Date: 10/2/2023  Authorization Period Expiration: 09/19/2024  Plan of Care Expiration: 12/2/2023  Progress Note Due: 11/2/2023  Visit # / Visits authorized: 1/ 1; 6/20   FOTO: 1/3     Precautions: Standard and HBP      Time In: 1500  Time Out: 1550  Total Appointment Time (timed & untimed codes): 50 minutes    Subjective     Pt reports: No complaints. Feels ready for DC with HEP.    FOTO IE - 58%  FOTO 10/16/23 - 60%  FOTO 10/30/23 - 65%  FOTO 11/13/23 - 69%  FOTO Goal - 69%    She was compliant with home exercise program.  Response to previous treatment: HEP compliance  Functional change: ongoing    Pain: 4/10  Location: chaz shoulders    Objective      Objective Measures updated at progress report unless specified.     Strength at IE -> 11/13:  Shoulder Right Left   Supraspinatus Full Can 3/5 -> 4/5 3/5  -> 4/5   ER 3/5  -> 4+/5 3/5  -> 4+/5   IR 4-/5  -> 5/5 4-/5  -> 5/5   Middle Trap 3/5 3/5   Low Trap 3-/5 3-/5     Special Tests -> 11/13:  Impingement:    Right Left   Ryan's Test + -> mild + + - > -      Rotator Cuff / Bicep Tendon:    Right Left   Painful Arc - -   Empty Can Test + -> - -   Speed's Test + -> mild + + -> -       Treatment     Buffy received the treatments listed below:      Buffy received therapeutic exercises to develop strength, endurance, ROM, flexibility, posture, and core stabilization for 10 minutes including:    UBE 5'/5' fwd/bwd Lvl 5.0 for UE ROM and light endurance    neuromuscular re-education activities to improve: Balance, Coordination, Kinesthetic,  "Sense, Proprioception, and Posture for 40 minutes. The following activities were included:     Patient Education:  - cont HEP w/ adjustments 11/13/2023    Assessments   Row BTB 3x20 - HEP  T BTB 3x10  ER walkout RTB 3x10 ea chaz    Serratus wall clock to 12" w/ BTB 3x10 ea chaz - HEP  Serratus wall clock to 9 and 3" w/ BTB 3x10 ea chaz  Serratus wall clock to 6" w/ BTB 3x10 ea chaz    Handcuff lift to 90 deg GTB 3x15 - back on wall - HEP    Held:  Scaption 1lbs 15x10 sec  Abduction 1lbs 15x10 sec  Flexion 1lbs 15x10 sec  Serratus wall V GTB 5x10x5 sec - HEP - NT  Handcuff lift to >90 deg GTB 3x15 - back on wall - NT      Patient Education and Home Exercises       Education provided:   - see above    Written Home Exercises Provided: yes. Exercises were reviewed and Buffy was able to demonstrate them prior to the end of the session.  Buffy demonstrated good  understanding of the education provided. See EMR under Patient Instructions for exercises provided during therapy sessions    Assessment     Patient demonstrates improvements in pain, impingement, strength, and scapular proprioception. Improved function to FOTO goal score level indicating significant improvement in function. Patient is also diligent with HEP.    Secondary to progress patient is DC with HEP.    Buffy Is progressing well towards her goals.   Pt prognosis is Good.     Pt will continue to benefit from skilled outpatient physical therapy to address the deficits listed in the problem list box on initial evaluation, provide pt/family education and to maximize pt's level of independence in the home and community environment.     Pt's spiritual, cultural and educational needs considered and pt agreeable to plan of care and goals.     Anticipated barriers to physical therapy: age / job duties    GOALS: Short Term Goals: 0-3 weeks  1.Report decreased shoulder pain  <   / =  4  /10 at worst to increase tolerance for ADLs - MET  2. Increase cervical ROM by 5-10 " degrees in order to perform ADLs with decreased difficulty. - IN PROGRESS  3. Increase strength in B shoulders/scapular stabilizers by 1/3 MMT grade to increase tolerance for ADL and work activities.  - MET  4. Pt to tolerate HEP to improve ROM and independence with ADL's  - MET     Long Term Goals: 4-6 weeks  1.Report decreased shoulder pain  <   / =  2  /10 at worst to increase tolerance for ADLs  - MET  2. Negative impingement testing - IN PROGRESS  3.Increased strength in B shoulders/scapular stabilizers to >/= 4/5 MMT grade to increase tolerance for ADL and work activities.  - IN PROGRESS  4. Pt will have improved gcode of CJ (20-40% limited) on FOTO neck score for neck pain disability in order to demonstrate true functional improvement.  - MET    Plan     Plan of care Certification: 10/2/2023 to 12/2/2023.     DC with HEP    SANTOS ASHER, PT, DPT, OCS

## 2023-11-28 ENCOUNTER — TELEPHONE (OUTPATIENT)
Dept: INTERNAL MEDICINE | Facility: CLINIC | Age: 63
End: 2023-11-28
Payer: COMMERCIAL

## 2023-11-28 DIAGNOSIS — Z12.31 VISIT FOR SCREENING MAMMOGRAM: Primary | ICD-10-CM

## 2023-11-28 NOTE — TELEPHONE ENCOUNTER
----- Message from Liv Conley sent at 11/28/2023 11:14 AM CST -----  Regarding: mammo  Contact: [phone]  Pt Buffy is calling. She would like to schedule her mammogram. Please give her a call back soon.

## 2024-01-04 RX ORDER — MELOXICAM 15 MG/1
15 TABLET ORAL
Qty: 30 TABLET | Refills: 2 | Status: SHIPPED | OUTPATIENT
Start: 2024-01-04

## 2024-03-26 RX ORDER — MELOXICAM 15 MG/1
15 TABLET ORAL
Qty: 30 TABLET | Refills: 2 | Status: SHIPPED | OUTPATIENT
Start: 2024-03-26

## 2024-05-21 ENCOUNTER — TELEPHONE (OUTPATIENT)
Dept: INTERNAL MEDICINE | Facility: CLINIC | Age: 64
End: 2024-05-21
Payer: COMMERCIAL

## 2024-05-21 NOTE — TELEPHONE ENCOUNTER
Spoke to pt and notified that because she is not a Diabetic,Dr. Carrasquillo is not prescribing Ozempic for weight loss . Pt voiced understanding

## 2024-05-21 NOTE — TELEPHONE ENCOUNTER
----- Message from Allyson Graham sent at 5/21/2024 12:20 PM CDT -----  Contact: [phone] pt  [email or web address]     Patient is calling for Medical Advice regarding:pt is calling for a callback....ozempic medication she wants to talk to you and have questions about it.    How long has patient had these symptoms:    Pharmacy name and phone#:    Would like response via [employer]:  callback    Comments:

## 2024-05-26 RX ORDER — ATORVASTATIN CALCIUM 80 MG/1
80 TABLET, FILM COATED ORAL DAILY
Qty: 90 TABLET | Refills: 0 | Status: SHIPPED | OUTPATIENT
Start: 2024-05-26

## 2024-05-26 NOTE — TELEPHONE ENCOUNTER
Refill Routing Note   Medication(s) are not appropriate for processing by [facility] for the following reason(s):        Required labs outdated    [facility] action(s):  Defer   Requires appointment : Yes   Requires labs : Yes             Appointments  past 12m or future 3m with PCP    Date Provider   Last Visit   4/24/2023 Minoo Carrasquillo MD   Next Visit   Visit date not found Minoo Carrasquillo MD   ED visits in past 90 days: 0        Note composed:10:12 AM 05/26/2024

## 2024-05-26 NOTE — TELEPHONE ENCOUNTER
Care Due:                  Date            Visit Type   Department     Provider  --------------------------------------------------------------------------------                                EP -                              PRIMARY      [facility] INTERNAL  Last Visit: 04-      CARE ([facility])   MEDICINE       Minoo Carrasquillo  Next Visit: None Scheduled  None         None Found                                                            Last  Test          Frequency    Reason                     Performed    Due Date  --------------------------------------------------------------------------------    Office Visit  15 months..  EScitalopram,              04- 07-                             atorvastatin.............    CMP.........  12 months..  atorvastatin.............  04- 04-    Lipid Panel.  12 months..  atorvastatin.............  04- 04-    [facility] Embedded Care Due Messages. Reference number: [id number].   5/26/2024 12:37:05 AM CDT

## 2024-06-10 ENCOUNTER — PATIENT MESSAGE (OUTPATIENT)
Dept: INTERNAL MEDICINE | Facility: CLINIC | Age: 64
End: 2024-06-10
Payer: COMMERCIAL

## 2024-07-01 RX ORDER — ESCITALOPRAM OXALATE 20 MG/1
TABLET ORAL
Qty: 90 TABLET | Refills: 0 | Status: SHIPPED | OUTPATIENT
Start: 2024-07-01

## 2024-07-01 NOTE — TELEPHONE ENCOUNTER
No care due was identified.  [facility] Embedded Care Due Messages. Reference number: [id number].   7/01/2024 12:28:22 AM CDT

## 2024-07-01 NOTE — TELEPHONE ENCOUNTER
Refill Decision Note   Buffy Morin  is requesting a refill authorization.  Brief Assessment and Rationale for Refill:  Approve     Medication Therapy Plan:         Comments:     Note composed:2:42 AM 07/01/2024

## 2024-07-02 DIAGNOSIS — I10 ESSENTIAL HYPERTENSION: ICD-10-CM

## 2024-07-02 RX ORDER — AMLODIPINE BESYLATE 10 MG/1
TABLET ORAL
Qty: 90 TABLET | Refills: 0 | Status: SHIPPED | OUTPATIENT
Start: 2024-07-02

## 2024-07-02 NOTE — TELEPHONE ENCOUNTER
No care due was identified.  [facility] Embedded Care Due Messages. Reference number: [id number].   7/02/2024 12:21:42 AM CDT   Cimetidine Counseling:  I discussed with the patient the risks of Cimetidine including but not limited to gynecomastia, headache, diarrhea, nausea, drowsiness, arrhythmias, pancreatitis, skin rashes, psychosis, bone marrow suppression and kidney toxicity.

## 2024-07-02 NOTE — TELEPHONE ENCOUNTER
Refill Decision Note   Buffy Morin  is requesting a refill authorization.  Brief Assessment and Rationale for Refill:  Approve     Medication Therapy Plan:         Comments:     Note composed:3:09 AM 07/02/2024

## 2024-07-08 DIAGNOSIS — M17.12 PRIMARY OSTEOARTHRITIS OF LEFT KNEE: Primary | ICD-10-CM

## 2024-07-08 RX ORDER — MELOXICAM 15 MG/1
15 TABLET ORAL DAILY
Qty: 30 TABLET | Refills: 2 | Status: SHIPPED | OUTPATIENT
Start: 2024-07-08

## 2024-08-12 ENCOUNTER — HOSPITAL ENCOUNTER (OUTPATIENT)
Dept: RADIOLOGY | Facility: HOSPITAL | Age: 64
Discharge: HOME | End: 2024-08-12
Attending: INTERNAL MEDICINE
Payer: COMMERCIAL

## 2024-08-12 ENCOUNTER — OFFICE VISIT (OUTPATIENT)
Dept: INTERNAL MEDICINE | Facility: CLINIC | Age: 64
End: 2024-08-12
Payer: COMMERCIAL

## 2024-08-12 VITALS
DIASTOLIC BLOOD PRESSURE: 70 MMHG | WEIGHT: 178 LBS | SYSTOLIC BLOOD PRESSURE: 120 MMHG | HEIGHT: 62 IN | BODY MASS INDEX: 32.76 KG/M2

## 2024-08-12 DIAGNOSIS — R73.01 IFG (IMPAIRED FASTING GLUCOSE): ICD-10-CM

## 2024-08-12 DIAGNOSIS — E78.2 MIXED HYPERLIPIDEMIA: ICD-10-CM

## 2024-08-12 DIAGNOSIS — Z00.00 ANNUAL PHYSICAL EXAM: Primary | ICD-10-CM

## 2024-08-12 DIAGNOSIS — I10 ESSENTIAL HYPERTENSION: ICD-10-CM

## 2024-08-12 DIAGNOSIS — Z12.31 VISIT FOR SCREENING MAMMOGRAM: ICD-10-CM

## 2024-08-12 DIAGNOSIS — E66.09 CLASS 1 OBESITY DUE TO EXCESS CALORIES WITHOUT SERIOUS COMORBIDITY WITH BODY MASS INDEX (BMI) OF 32.0 TO 32.9 IN ADULT: ICD-10-CM

## 2024-08-12 DIAGNOSIS — F41.9 ANXIETY: ICD-10-CM

## 2024-08-12 PROBLEM — M25.519 SHOULDER PAIN: Status: RESOLVED | Noted: 2023-10-02 | Resolved: 2024-08-12

## 2024-08-12 PROCEDURE — 3074F SYST BP LT 130 MM HG: CPT | Mod: CPTII,S$GLB,, | Performed by: INTERNAL MEDICINE

## 2024-08-12 PROCEDURE — 77063 BREAST TOMOSYNTHESIS BI: CPT | Mod: 26,,, | Performed by: RADIOLOGY

## 2024-08-12 PROCEDURE — 3288F FALL RISK ASSESSMENT DOCD: CPT | Mod: CPTII,S$GLB,, | Performed by: INTERNAL MEDICINE

## 2024-08-12 PROCEDURE — 3008F BODY MASS INDEX DOCD: CPT | Mod: CPTII,S$GLB,, | Performed by: INTERNAL MEDICINE

## 2024-08-12 PROCEDURE — 99397 PER PM REEVAL EST PAT 65+ YR: CPT | Mod: S$GLB,,, | Performed by: INTERNAL MEDICINE

## 2024-08-12 PROCEDURE — 77067 SCR MAMMO BI INCL CAD: CPT | Mod: 26,,, | Performed by: RADIOLOGY

## 2024-08-12 PROCEDURE — 3078F DIAST BP <80 MM HG: CPT | Mod: CPTII,S$GLB,, | Performed by: INTERNAL MEDICINE

## 2024-08-12 PROCEDURE — 1101F PT FALLS ASSESS-DOCD LE1/YR: CPT | Mod: CPTII,S$GLB,, | Performed by: INTERNAL MEDICINE

## 2024-08-12 PROCEDURE — 99999 PR PBB SHADOW E&M-EST. PATIENT-LVL III: CPT | Mod: PBBFAC,,, | Performed by: INTERNAL MEDICINE

## 2024-08-12 PROCEDURE — 1159F MED LIST DOCD IN RCRD: CPT | Mod: CPTII,S$GLB,, | Performed by: INTERNAL MEDICINE

## 2024-08-12 PROCEDURE — 77067 SCR MAMMO BI INCL CAD: CPT | Mod: TC

## 2024-08-12 RX ORDER — LORAZEPAM 0.5 MG/1
0.5 TABLET ORAL DAILY PRN
Qty: 30 TABLET | Refills: 3 | Status: SHIPPED | OUTPATIENT
Start: 2024-08-12 | End: 2024-12-10

## 2024-08-12 RX ORDER — ESCITALOPRAM OXALATE 20 MG/1
20 TABLET ORAL DAILY
Qty: 90 TABLET | Refills: 3 | Status: SHIPPED | OUTPATIENT
Start: 2024-08-12

## 2024-08-12 RX ORDER — AMLODIPINE BESYLATE 10 MG/1
10 TABLET ORAL DAILY
Qty: 90 TABLET | Refills: 3 | Status: SHIPPED | OUTPATIENT
Start: 2024-08-12

## 2024-08-12 NOTE — PROGRESS NOTES
Patient ID: Buffy Morin is a 63 y.o. female.    Chief Complaint: Annual Exam      Assessment:       1. Annual physical exam    2. Essential hypertension    3. Mixed hyperlipidemia    4. IFG (impaired fasting glucose)    5. Class 1 obesity due to excess calories without serious comorbidity with body mass index (BMI) of 32.0 to 32.9 in adult    6. Anxiety          Plan:           1. Annual physical exam  -     Comprehensive Metabolic Panel; Future; Expected date: 08/12/2024  -     CBC Auto Differential; Future; Expected date: 08/12/2024  -     Hemoglobin A1C; Future; Expected date: 08/12/2024  -     Lipid Panel; Future; Expected date: 08/12/2024  -     TSH; Future; Expected date: 08/12/2024  -     DXA Bone Density Axial Skeleton 1 or more sites; Future; Expected date: 08/12/2024    2. Essential hypertension  -     amLODIPine (NORVASC) 10 MG tablet; Take 1 tablet (10 mg total) by mouth once daily.  Dispense: 90 tablet; Refill: 3    3. Mixed hyperlipidemia    4. IFG (impaired fasting glucose)    5. Class 1 obesity due to excess calories without serious comorbidity with body mass index (BMI) of 32.0 to 32.9 in adult    6. Anxiety    Other orders  -     EScitalopram oxalate (LEXAPRO) 20 MG tablet; Take 1 tablet (20 mg total) by mouth once daily.  Dispense: 90 tablet; Refill: 3  -     LORazepam (ATIVAN) 0.5 MG tablet; Take 1 tablet (0.5 mg total) by mouth daily as needed for Anxiety.  Dispense: 30 tablet; Refill: 3       Labs and review  Continue with current regimen  Exercise and weight loss issues reviewed   reviewed  She declines immunizations today- may consider at a later point  Schedule DEXA scan  Review results of mammogram  Low salt diet, exercise, 15-20-# weight loss in next 6-12 months' time.  Call if BP > 130/80 on a regular basis.  Subjective:   Annual exam    Due for labs and DEXA.    Mammogram is done, results pending.    Ortho issues- shoulder better since PT.  Occasional sciatica.  It is nothing  that she can not handle.  She is hoping to work on exercise.  Weight loss issues reviewed, she is sleeping well.  She is planning to work on portion control and will let me know should she desire any additional assistance.    Immunizations discussed.  She declines all.    BP doing well.  BMI 32.  Sleep is good.        Patient Active Problem List   Diagnosis    Anxiety    Carpal tunnel syndrome    Mild vitamin D deficiency    Mixed hyperlipidemia    Family history of breast cancer in sister    Essential hypertension    S/P hysterectomy: complete    Lumbar radiculopathy    IFG (impaired fasting glucose)    Class 1 obesity due to excess calories without serious comorbidity with body mass index (BMI) of 32.0 to 32.9 in adult    Cervical radiculopathy        Review of Systems   Constitutional:  Negative for fatigue.   HENT:  Negative for hearing loss.    Eyes:  Negative for visual disturbance.   Respiratory:  Negative for shortness of breath.    Cardiovascular:  Negative for chest pain.   Gastrointestinal:  Negative for abdominal pain, constipation and diarrhea.   Genitourinary:  Negative for dysuria, frequency and vaginal bleeding.   Musculoskeletal:  Positive for arthralgias and back pain. Negative for joint swelling.        See HPI   Skin:  Negative for rash.   Neurological:  Negative for weakness, light-headedness and headaches.   Psychiatric/Behavioral:  Negative for sleep disturbance.         Mood stable on her regimen         Objective:      Physical Exam  Vitals and nursing note reviewed.   Constitutional:       Appearance: She is well-developed.   HENT:      Head: Normocephalic and atraumatic.      Right Ear: External ear normal.      Left Ear: External ear normal.      Nose: Nose normal.      Mouth/Throat:      Pharynx: No oropharyngeal exudate.   Eyes:      General: No scleral icterus.     Extraocular Movements: Extraocular movements intact.      Conjunctiva/sclera: Conjunctivae normal.   Neck:      Thyroid:  No thyromegaly.      Vascular: No JVD.   Cardiovascular:      Rate and Rhythm: Normal rate and regular rhythm.      Heart sounds: Normal heart sounds. No murmur heard.     No gallop.   Pulmonary:      Effort: Pulmonary effort is normal. No respiratory distress.      Breath sounds: Normal breath sounds. No wheezing.   Abdominal:      General: Bowel sounds are normal. There is no distension.      Palpations: Abdomen is soft. There is no mass.      Tenderness: There is no abdominal tenderness. There is no guarding or rebound.   Musculoskeletal:         General: No tenderness. Normal range of motion.      Cervical back: Normal range of motion and neck supple.   Lymphadenopathy:      Cervical: No cervical adenopathy.   Skin:     General: Skin is warm.      Findings: No erythema or rash.   Neurological:      General: No focal deficit present.      Mental Status: She is alert and oriented to person, place, and time.      Cranial Nerves: No cranial nerve deficit.      Coordination: Coordination normal.   Psychiatric:         Behavior: Behavior normal.         Thought Content: Thought content normal.         Judgment: Judgment normal.             Health Maintenance Due   Topic Date Due    DEXA Scan  Never done    RSV Vaccine (Age 60+ and Pregnant patients) (1 - 1-dose 60+ series) Never done    Mammogram  08/22/2023    COVID-19 Vaccine (5 - 2023-24 season) 09/01/2023    Pneumococcal Vaccines (Age 65+) (1 of 1 - PCV) Never done    Hemoglobin A1c (Prediabetes)  04/24/2024    Lipid Panel  04/24/2024

## 2024-08-16 ENCOUNTER — LAB VISIT (OUTPATIENT)
Dept: LAB | Facility: HOSPITAL | Age: 64
End: 2024-08-16
Attending: INTERNAL MEDICINE
Payer: COMMERCIAL

## 2024-08-16 DIAGNOSIS — Z00.00 ANNUAL PHYSICAL EXAM: ICD-10-CM

## 2024-08-16 LAB
ALBUMIN SERPL BCP-MCNC: 4.4 G/DL (ref 3.5–5.2)
ALP SERPL-CCNC: 139 U/L (ref 55–135)
ALT SERPL W/O P-5'-P-CCNC: 31 U/L (ref 10–44)
ANION GAP SERPL CALC-SCNC: 10 MMOL/L (ref 8–16)
AST SERPL-CCNC: 28 U/L (ref 10–40)
BASOPHILS # BLD AUTO: 0.05 K/UL (ref 0–0.2)
BASOPHILS NFR BLD: 0.9 % (ref 0–1.9)
BILIRUB SERPL-MCNC: 0.4 MG/DL (ref 0.1–1)
BUN SERPL-MCNC: 11 MG/DL (ref 8–23)
CALCIUM SERPL-MCNC: 10 MG/DL (ref 8.7–10.5)
CHLORIDE SERPL-SCNC: 107 MMOL/L (ref 95–110)
CHOLEST SERPL-MCNC: 143 MG/DL (ref 120–199)
CHOLEST/HDLC SERPL: 2.5 {RATIO} (ref 2–5)
CO2 SERPL-SCNC: 24 MMOL/L (ref 23–29)
CREAT SERPL-MCNC: 0.9 MG/DL (ref 0.5–1.4)
DIFFERENTIAL METHOD BLD: ABNORMAL
EOSINOPHIL # BLD AUTO: 0.1 K/UL (ref 0–0.5)
EOSINOPHIL NFR BLD: 1.9 % (ref 0–8)
ERYTHROCYTE [DISTWIDTH] IN BLOOD BY AUTOMATED COUNT: 15.5 % (ref 11.5–14.5)
EST. GFR  (NO RACE VARIABLE): >60 ML/MIN/1.73 M^2
ESTIMATED AVG GLUCOSE: 134 MG/DL (ref 68–131)
GLUCOSE SERPL-MCNC: 81 MG/DL (ref 70–110)
HBA1C MFR BLD: 6.3 % (ref 4–5.6)
HCT VFR BLD AUTO: 41.4 % (ref 37–48.5)
HDLC SERPL-MCNC: 57 MG/DL (ref 40–75)
HDLC SERPL: 39.9 % (ref 20–50)
HGB BLD-MCNC: 12.8 G/DL (ref 12–16)
IMM GRANULOCYTES # BLD AUTO: 0.02 K/UL (ref 0–0.04)
IMM GRANULOCYTES NFR BLD AUTO: 0.3 % (ref 0–0.5)
LDLC SERPL CALC-MCNC: 76.8 MG/DL (ref 63–159)
LYMPHOCYTES # BLD AUTO: 2.7 K/UL (ref 1–4.8)
LYMPHOCYTES NFR BLD: 45.7 % (ref 18–48)
MCH RBC QN AUTO: 26.4 PG (ref 27–31)
MCHC RBC AUTO-ENTMCNC: 30.9 G/DL (ref 32–36)
MCV RBC AUTO: 86 FL (ref 82–98)
MONOCYTES # BLD AUTO: 0.4 K/UL (ref 0.3–1)
MONOCYTES NFR BLD: 7 % (ref 4–15)
NEUTROPHILS # BLD AUTO: 2.6 K/UL (ref 1.8–7.7)
NEUTROPHILS NFR BLD: 44.2 % (ref 38–73)
NONHDLC SERPL-MCNC: 86 MG/DL
NRBC BLD-RTO: 0 /100 WBC
PLATELET # BLD AUTO: 273 K/UL (ref 150–450)
PMV BLD AUTO: 11.1 FL (ref 9.2–12.9)
POTASSIUM SERPL-SCNC: 4.4 MMOL/L (ref 3.5–5.1)
PROT SERPL-MCNC: 8.3 G/DL (ref 6–8.4)
RBC # BLD AUTO: 4.84 M/UL (ref 4–5.4)
SODIUM SERPL-SCNC: 141 MMOL/L (ref 136–145)
TRIGL SERPL-MCNC: 46 MG/DL (ref 30–150)
WBC # BLD AUTO: 5.87 K/UL (ref 3.9–12.7)

## 2024-08-16 PROCEDURE — 36415 COLL VENOUS BLD VENIPUNCTURE: CPT | Mod: PO | Performed by: INTERNAL MEDICINE

## 2024-08-16 PROCEDURE — 84443 ASSAY THYROID STIM HORMONE: CPT | Performed by: INTERNAL MEDICINE

## 2024-08-16 PROCEDURE — 80061 LIPID PANEL: CPT | Performed by: INTERNAL MEDICINE

## 2024-08-16 PROCEDURE — 80053 COMPREHEN METABOLIC PANEL: CPT | Performed by: INTERNAL MEDICINE

## 2024-08-16 PROCEDURE — 83036 HEMOGLOBIN GLYCOSYLATED A1C: CPT | Performed by: INTERNAL MEDICINE

## 2024-08-16 PROCEDURE — 85025 COMPLETE CBC W/AUTO DIFF WBC: CPT | Performed by: INTERNAL MEDICINE

## 2024-08-17 LAB — TSH SERPL DL<=0.005 MIU/L-ACNC: 1.34 UIU/ML (ref 0.4–4)

## 2024-09-04 ENCOUNTER — TELEPHONE (OUTPATIENT)
Dept: INTERNAL MEDICINE | Facility: CLINIC | Age: 64
End: 2024-09-04
Payer: COMMERCIAL

## 2024-09-04 DIAGNOSIS — R73.01 IFG (IMPAIRED FASTING GLUCOSE): Primary | ICD-10-CM

## 2024-09-04 NOTE — TELEPHONE ENCOUNTER
Please call, she has not read her my [facility] message    If she does not use my [facility], let us please take her off of it    Stable labs, overall acceptable aside from the fact that she is prediabetic and those levels are increasing.  We had discussed portion control, low-carbohydrate eating, exercise and weight loss at her last appointment    Does she want to see a dietitian at this time?

## 2024-09-04 NOTE — TELEPHONE ENCOUNTER
"Spoke to pt and gave results:   "Stable labs, overall acceptable aside from the fact that she is prediabetic and those levels are increasing.  We had discussed portion control, low-carbohydrate eating, exercise and weight loss at her last appointment  Does she want to see a dietitian at this time"? Patient is open to seeing a dietitian.     "

## 2024-09-04 NOTE — TELEPHONE ENCOUNTER
Great, referral placed to the diabetes education dietitian.  Please assist with scheduling, thank you

## 2024-09-26 ENCOUNTER — CLINICAL SUPPORT (OUTPATIENT)
Dept: DIABETES | Facility: CLINIC | Age: 64
End: 2024-09-26
Payer: COMMERCIAL

## 2024-09-26 DIAGNOSIS — R73.01 IFG (IMPAIRED FASTING GLUCOSE): ICD-10-CM

## 2024-09-26 PROCEDURE — G0108 DIAB MANAGE TRN  PER INDIV: HCPCS | Mod: S$GLB,,, | Performed by: DIETITIAN, REGISTERED

## 2024-09-26 PROCEDURE — 99999 PR PBB SHADOW E&M-EST. PATIENT-LVL II: CPT | Mod: PBBFAC,,, | Performed by: DIETITIAN, REGISTERED

## 2024-09-27 NOTE — PROGRESS NOTES
"Diabetes Care Specialist Progress Note  Author: Jodi Coates RD  Date: 9/27/2024    Intake    Program Intake  Reason for Diabetes Program Visit:: Initial Diabetes Assessment  Current diabetes risk level:: low  In the last 12 months, have you:: none  Permission to speak with others about care:: no    Current Diabetes Treatment: Diet/Exercise  Diet/Exercise Type/Dose: low to no carb diet/daily walks    Continuous Glucose Monitoring  Patient has CGM: No    Lab Results   Component Value Date    HGBA1C 6.3 (H) 08/16/2024       Weight: (P) 81.1 kg (178 lb 12.7 oz)   Height: (P) 5' 2" (157.5 cm)   Body mass index is 32.7 kg/m² (pended).    Lifestyle Coping Support & Clinical    Lifestyle/Coping/Support  Does anyone in your family have diabetes or does anyone in your family support you in your diabetes care?: positive family hx; gets support from family  List anything about Diabetes that causes you stress?: not much stress yet  How do you deal with stress/distress?: pray  Learning Barriers:: None  Culture or [employer] beliefs that may impact ability to access healthcare: No  Psychosocial/Coping Skills Assessment Completed: : Yes  Assessment indicates:: Adequate understanding    Problem Review  Active Comorbidities: Hyperlipidemia/Dyslipidemia, Obesity    Diabetes Self-Management Skills Assessment    Medication Skills Assessment  Patient is able to identify current diabetes medications, dosages, and appropriate timing of medications.: yes (currently takes no meds for diabetes)  Patient reports problems or concerns with current medication regimen.: no  Patient is  aware that some diabetes medications can cause low blood sugar?: Yes  Medication Skills Assessment Completed:: Yes  Assessment indicates:: Adequate understanding  Area of need?: No    Diabetes Disease Process/Treatment Options  Diabetes Type?: Other (Comment) (prediabetes)  When were you diagnosed?: prediabetes in Aug 2024  If previous diabetes education, " when/where:: none  What are your goals for this education session?: learn how to manage or reverse diabetes  Is patient aware of what causes diabetes?: Yes  Does patient understand the pathophysiology of diabetes?: No  Diabetes Disease Process/Treatment Options: Skills Assessment Completed: Yes  Assessment indicates:: Instruction Needed  Area of need?: Yes    Nutrition/Healthy Eating  Meal Plan 24 Hour Recall - Breakfast: egg white with avocado, blueberries and cheese on toast  Meal Plan 24 Hour Recall - Lunch: salad with peyman, nuts, cucumbers, protein and cut up fruit such as apples or kiwi  Meal Plan 24 Hour Recall - Dinner: protein and veggies  Meal Plan 24 Hour Recall - Snack: cookie, small blue bell  Meal Plan 24 Hour Recall - Beverage: regular lemonade and fruit punch  Who shops/cooks?: pt does both  Patient can identify foods that impact blood sugar.: yes  Challenges to healthy eating:: snacking between meals and at night, portion control  Nutrition/Healthy Eating Skills Assessment Completed:: Yes  Assessment indicates:: Instruction Needed  Area of need?: Yes    Physical Activity/Exercise  Patient's daily activity level:: lightly active  Patient formally exercises outside of work.: yes  Frequency: four or more times a week  Patient can identify forms of physical activity.: yes  Physical Activity/Exercise Skills Assessment Completed: : Yes  Assessment indicates:: Instruction Needed  Area of need?: Yes    Home Blood Glucose Monitoring  Patient states that blood sugar is checked at home daily.: no  Home Blood Glucose Monitoring Skills Assessment Completed: : Yes  Area of need?: No    Assessment Summary and Plan    Based on today's diabetes care assessment, the following areas of need were identified:          9/26/2024    12:01 AM   Areas of Need   Medications/Current Diabetes Treatment No   Diabetes Disease Process/Treatment Options Yes, reviewed pathos of DM   Nutrition/Healthy Eating Yes, see care plan  below   Physical Activity/Exercise Yes, taught effects of exercise on BG; discussed frequency, intensity and duration recommendations   Home Blood Glucose Monitoring No       Today's interventions were provided through individual discussion, instruction, and written materials were provided.      Patient verbalized understanding of instruction and written materials.  Pt was able to return back demonstration of instructions today. Patient understood key points, needs reinforcement and further instruction.     Diabetes Self-Management Care Plan:    Today's Diabetes Self-Management Care Plan was developed with Buffy's input. Buffy has agreed to work toward the following goal(s) to improve his/her overall diabetes control.      Care Plan: Diabetes Management   Updates made since 8/28/2024 12:00 AM        Problem: Healthy Eating         Goal: Eat 3 meals daily with 30-45g/2-3 servings of Carbohydrate per meal.    Start Date: 9/27/2024   Expected End Date: 9/30/2025   Priority: High   Barriers: No Barriers Identified   Note:    9/27/24: Pt eats 3 meals/day; eats an overall healthy diet; trying to eliminate most carbs from her diet. She does not eat fast food but does drink sugary beverages. She will eliminate sugary beverages and add high fiber carbs to her diet to help lower BG.       Task: Reviewed the sources and role of Carbohydrate, Protein, and Fat and how each nutrient impacts blood sugar. Completed 9/27/2024        Task: Provided visual examples using dry measuring cups, food models, and other familiar objects such as computer mouse, deck or cards, tennis ball etc. to help with visualization of portions. Completed 9/27/2024        Task: Explained how to count carbohydrates using the food label and the use of dry measuring cups for accurate carb counting. Completed 9/27/2024     Task: Recommended replacing beverages containing high sugar content with noncaloric/sugar free options and/or water. Completed 9/27/2024         Task: Review the importance of balancing carbohydrates with each meal using portion control techniques to count servings of carbohydrate and label reading to identify serving size and amount of total carbs per serving. Completed 9/27/2024        Task: Provided Sample plate method and reviewed the use of the plate to estimate amounts of carbohydrate per meal. Completed 9/27/2024          Follow Up Plan     Follow up in about 3 months (around 12/26/2024). Assess weight, A1c, dietary changes, physical activity, complications.    Today's care plan and follow up schedule was discussed with patient.  Buffy verbalized understanding of the care plan, goals, and agrees to follow up plan.        The patient was encouraged to communicate with his/her health care provider/physician and care team regarding his/her condition(s) and treatment.  I provided the patient with my contact information today and encouraged to contact me via phone or [facility] Patient Portal as needed.     Length of Visit   Total Time: 60 Minutes

## 2024-10-05 ENCOUNTER — NURSE TRIAGE (OUTPATIENT)
Dept: ADMINISTRATIVE | Facility: CLINIC | Age: 64
End: 2024-10-05
Payer: COMMERCIAL

## 2024-10-05 NOTE — TELEPHONE ENCOUNTER
Drank bleach by mistake. Advised to ED now and noted alternate disposition and connected the caller with [facility] at poison control through a warm transfer.  Reason for Disposition   [1] CAUSTIC ACID or ALKALI ingestion (e.g., toilet , drain , lye, Clinitest tablets, ammonia, bleaches) AND [2] any symptoms (e.g., difficulty breathing, drooling, mouth pain, sore throat, stridor)    Additional Information   Negative: SEVERE difficulty breathing (e.g., struggling for each breath, speaks in single words)   Negative: Bluish (or gray) lips or face now   Negative: Slow, shallow and weak breathing   Negative: Difficult to awaken or acting confused (e.g., disoriented, slurred speech)   Negative: Seizure   Negative: Shock suspected (e.g., cold/pale/clammy skin, too weak to stand, low BP, rapid pulse)   Negative: Suicide attempt, known or suspected   Negative: [1] Intentional overdose AND [2] suicidal thoughts or ideas   Negative: Sounds like a life-threatening emergency to the triager    Protocols used: Poisoning-A-

## 2024-10-21 ENCOUNTER — OFFICE VISIT (OUTPATIENT)
Dept: URGENT CARE | Facility: CLINIC | Age: 64
End: 2024-10-21
Payer: COMMERCIAL

## 2024-10-21 VITALS
BODY MASS INDEX: 32.86 KG/M2 | WEIGHT: 178.56 LBS | TEMPERATURE: 97 F | DIASTOLIC BLOOD PRESSURE: 88 MMHG | OXYGEN SATURATION: 98 % | RESPIRATION RATE: 20 BRPM | HEIGHT: 62 IN | HEART RATE: 82 BPM | SYSTOLIC BLOOD PRESSURE: 148 MMHG

## 2024-10-21 DIAGNOSIS — H65.113 ACUTE ALLERGIC SEROUS OTITIS MEDIA OF BOTH EARS: ICD-10-CM

## 2024-10-21 DIAGNOSIS — I10 ELEVATED BLOOD PRESSURE READING IN OFFICE WITH DIAGNOSIS OF HYPERTENSION: ICD-10-CM

## 2024-10-21 DIAGNOSIS — R94.31 NONSPECIFIC ABNORMAL ELECTROCARDIOGRAM (ECG): ICD-10-CM

## 2024-10-21 DIAGNOSIS — R42 DIZZINESS: Primary | ICD-10-CM

## 2024-10-21 LAB
OHS QRS DURATION: 80 MS
OHS QTC CALCULATION: 408 MS

## 2024-10-21 PROCEDURE — 99214 OFFICE O/P EST MOD 30 MIN: CPT | Mod: S$GLB,,, | Performed by: PHYSICIAN ASSISTANT

## 2024-10-21 PROCEDURE — 93010 ELECTROCARDIOGRAM REPORT: CPT | Mod: S$GLB,,, | Performed by: STUDENT IN AN ORGANIZED HEALTH CARE EDUCATION/TRAINING PROGRAM

## 2024-10-21 PROCEDURE — 93005 ELECTROCARDIOGRAM TRACING: CPT | Mod: S$GLB,,, | Performed by: PHYSICIAN ASSISTANT

## 2024-10-21 RX ORDER — MECLIZINE HCL 25MG 25 MG/1
25 TABLET, CHEWABLE ORAL
Status: COMPLETED | OUTPATIENT
Start: 2024-10-21 | End: 2024-10-21

## 2024-10-21 RX ORDER — FLUTICASONE PROPIONATE 50 MCG
2 SPRAY, SUSPENSION (ML) NASAL DAILY PRN
Qty: 15.8 ML | Refills: 0 | Status: SHIPPED | OUTPATIENT
Start: 2024-10-21 | End: 2024-11-20

## 2024-10-21 RX ORDER — LORATADINE 10 MG/1
10 TABLET ORAL DAILY PRN
Qty: 30 TABLET | Refills: 0 | Status: SHIPPED | OUTPATIENT
Start: 2024-10-21 | End: 2024-11-20

## 2024-10-21 RX ORDER — MECLIZINE HCL 12.5 MG 12.5 MG/1
12.5 TABLET ORAL 3 TIMES DAILY PRN
Qty: 60 TABLET | Refills: 0 | Status: SHIPPED | OUTPATIENT
Start: 2024-10-21 | End: 2024-11-20

## 2024-10-21 RX ADMIN — MECLIZINE HCL 25MG 25 MG: 25 TABLET, CHEWABLE ORAL at 12:10

## 2024-10-21 NOTE — PROGRESS NOTES
"Subjective:      Patient ID: Buffy Morin is a 63 y.o. female.    Vitals:  height is 5' 2" (1.575 m) and weight is 81 kg (178 lb 9.2 oz). Her oral temperature is 97 °F (36.1 °C). Her blood pressure is 148/88 (abnormal) and her pulse is 82. Her respiration is 20 and oxygen saturation is 98%.     Chief Complaint: Dizziness    Buffy Morin is a 63 y.o. female with hypertension who complains of  Pt present with dizziness/lightheadedness. Started on Friday. Tx include nothing at home. States she felt like whenever her pressure goes up she feels dizzy     Dizziness:   Chronicity:  New  Onset:  In the past 7 days  Progression since onset:  Gradually worsening  Frequency:  Constantly  Severity:  Mild  Duration:  Very brief  Dizziness characteristics:  Lightheaded/impending faint, sensation of movement and off-balance   Associated symptoms: aural fullness and light-headedness.no hearing loss, no ear congestion, no ear pain, no fever, no headaches, no tinnitus, no nausea, no vomiting, no diaphoresis, no weakness, no visual disturbances, no syncope, no palpitations, no panic, no facial weakness, no slurred speech, no numbness in extremities and no chest pain.  Aggravated by:  Nothing  Treatments tried:  Nothing  Improvements on treatment:  No relief   PMH includes: environmental allergies.      Constitution: Negative for activity change, appetite change, chills, sweating, fatigue, fever and generalized weakness.   HENT:  Negative for ear pain, tinnitus, hearing loss, congestion, postnasal drip, sinus pain, sinus pressure, sore throat, trouble swallowing and voice change.    Cardiovascular:  Negative for chest pain, leg swelling, palpitations, sob on exertion and passing out.   Eyes:  Negative for blurred vision.   Respiratory:  Negative for cough, sputum production, shortness of breath, wheezing and asthma.    Gastrointestinal:  Negative for abdominal pain, nausea and vomiting.   Allergic/Immunologic: Positive for " environmental allergies and seasonal allergies. Negative for asthma.   Neurological:  Positive for dizziness and light-headedness. Negative for history of vertigo, passing out, headaches, history of migraines, numbness and tingling.   Psychiatric/Behavioral:  Negative for nervous/anxious. The patient is not nervous/anxious.       Objective:     Physical Exam   Constitutional: She is oriented to person, place, and time. She is cooperative. No distress.      Comments:Patient is awake and alert, sitting up in exam chair, speaking and answering in complete sentences   normal  HENT:   Head: Normocephalic and atraumatic.   Ears:   Right Ear: External ear and ear canal normal. A middle ear effusion is present.   Left Ear: External ear and ear canal normal. A middle ear effusion is present.   Nose: No rhinorrhea, sinus tenderness or congestion. Right sinus exhibits no maxillary sinus tenderness and no frontal sinus tenderness. Left sinus exhibits no maxillary sinus tenderness and no frontal sinus tenderness.   Mouth/Throat: Uvula is midline, oropharynx is clear and moist and mucous membranes are normal. Mucous membranes are moist. No oropharyngeal exudate or posterior oropharyngeal erythema. Tonsils are 0 on the right. Tonsils are 0 on the left. Oropharynx is clear.   Eyes: Conjunctivae are normal. Pupils are equal, round, and reactive to light. Extraocular movement intact   Neck: Neck supple.   Cardiovascular: Normal rate, regular rhythm, normal heart sounds and normal pulses.   Pulmonary/Chest: Effort normal and breath sounds normal. No respiratory distress. She has no wheezes. She has no rhonchi. She has no rales.   Abdominal: Normal appearance.   Musculoskeletal: Normal range of motion.         General: Normal range of motion.      Cervical back: She exhibits no tenderness.   Lymphadenopathy:     She has no cervical adenopathy.   Neurological: She is alert and oriented to person, place, and time. She has normal motor  "skills, normal sensation and intact cranial nerves (2-12). Coordination normal. GCS eye subscore is 4. GCS verbal subscore is 5. GCS motor subscore is 6.   Skin: Skin is warm.   Psychiatric: Her behavior is normal. Mood, judgment and thought content normal.   Nursing note and vitals reviewed.          Vitals:    10/21/24 1059 10/21/24 1143 10/21/24 1144 10/21/24 1145   BP: (!) 169/84 (!) 140/82 (!) 144/86 (!) 148/88   BP Location: Right arm      Patient Position: Sitting      Pulse: 60 64 82    Resp: 20      Temp: 97 °F (36.1 °C)      TempSrc: Oral      SpO2: 98%      Weight: 81 kg (178 lb 9.2 oz)      Height: 5' 2" (1.575 m)          Assessment:     1. Dizziness    2. Elevated blood pressure reading in office with diagnosis of hypertension    3. Acute allergic serous otitis media of both ears    4. Nonspecific abnormal electrocardiogram (ECG)      Patient presents with clinical exam findings and history consistent with above.      On exam, patient is nontoxic appearing and vitals are stable.        Diagnostic testing results were reviewed and discussed with patient/guardian.   Tests ordered in clinic:  EKG:    Results for orders placed or performed in visit on 10/21/24   IN OFFICE EKG 12-LEAD (to [facility])    Collection Time: 10/21/24 11:31 AM   Result Value Ref Range    QRS Duration 80 ms    [facility] QTC Calculation 408 ms    Narrative    Test Reason : R42,I10,    Vent. Rate : 061 BPM     Atrial Rate : 061 BPM     P-R Int : 142 ms          QRS Dur : 080 ms      QT Int : 406 ms       P-R-T Axes : 053 025 006 degrees     QTc Int : 408 ms    Normal sinus rhythm  Possible Left atrial enlargement  Nonspecific T wave abnormality  Abnormal ECG  When compared with ECG of 15-NOV-2022 14:38,    Confirmed by Blaze Rothman MD, Esdras ([id number]) on 10/21/2024 10:56:59 PM    Referred By:             Confirmed By:Esdras Rothman,         Previous progress notes/admissions/labs and medications were reviewed.    Plan:   Negative dixhall " pike maneuver. Pt reports moderate improvement with meclizine. Recommend to see cardiology for abnormal EKG. Recommend flonase and claritin for allergic serous otitis media; pt cannot tolerate pseudoephedrine due to hypertension.    Dizziness  -     IN OFFICE EKG 12-LEAD (to [facility])  -     Orthostatic vital signs  -     meclizine tablet 25 mg  -     meclizine (ANTIVERT) 12.5 mg tablet; Take 1 tablet (12.5 mg total) by mouth 3 (three) times daily as needed for Dizziness or Nausea.  Dispense: 60 tablet; Refill: 0  -     Ambulatory referral/consult to Internal Medicine  -     Ambulatory referral/consult to Cardiology    Elevated blood pressure reading in office with diagnosis of hypertension  -     IN OFFICE EKG 12-LEAD (to [facility])  -     Orthostatic vital signs  -     Ambulatory referral/consult to Internal Medicine  -     Ambulatory referral/consult to Cardiology    Acute allergic serous otitis media of both ears  -     loratadine (CLARITIN) 10 mg tablet; Take 1 tablet (10 mg total) by mouth daily as needed for Allergies.  Dispense: 30 tablet; Refill: 0  -     fluticasone propionate (FLONASE) 50 mcg/actuation nasal spray; 2 sprays (100 mcg total) by Each Nostril route daily as needed for Allergies or Rhinitis.  Dispense: 15.8 mL; Refill: 0    Nonspecific abnormal electrocardiogram (ECG)  -     Ambulatory referral/consult to Cardiology                    1) See orders for this visit as documented in the electronic medical record.  2) Symptomatic therapy suggested: use acetaminophen/ibuprofen every 6-8 hours prn pain or fever, push fluids.   3) Call or return to clinic prn if these symptoms worsen or fail to improve as anticipated.    Discussed results/diagnosis/plan with patient in clinic.  We had shared decision making for patient's treatment. Patient verbalized understanding and in agreement with current treatment plan.     Patient was instructed to return for re-evaluation with urgent care or PCP for continued  "outpatient workup and management if symptoms do not improve/worsening symptoms. Strict ED versus clinic precautions given in depth.    Discharge and follow-up instructions given verbally/printed with the patient who expressed understanding. The instructions and results are also available on [facility].              Ora "Sakina" ZOHAIB Bond          Patient Instructions   Take Meclizine 12.5 to 25 mg po TID prn dizziness/vertigo. It can cause drowsiness.       Recommend oral antihistamine (Claritin/zyrtec) +/- oral decongestant (pseudoephedrine) for rhinorrhea/ear congestion if blood pressure <130/80mmhg, steroid nasal spray (flonase)     For nose bleeds; recommend nasal irrigation with a saline spray/gel (AYR nasal gel) or Netti Pot like device per their directions is also recommended.  Please drink plenty of fluids.  Please get plenty of rest.  If you  smoke, please stop smoking.      Discussed prescriptions and over-the-counter medicines to help with patient's symptoms:  A steroid nose spray (flonase) can help with a stuffy nose. It can also help with drainage down the back of your throat.  An antihistamine (loratadine,zyrtec,allegra, xyzal) can help with itching, sneezing, or runny nose.  An antihistamine eye drop can help with itchy eyes.  A decongestant (pseudoephedrine,  Phenylephrine) can help with a stuffy nose. Take <10 days for congestion and rhinorrhea. Once symptoms improve, proceed with loratadine/zyrtec once a day. These ingredients can keep you up all night, decrease appetite, feel jittery, and raise blood pressure with long term use.      If your blood pressure was elevated during your visit and this was discussed with you, please obtain a home blood pressure cuff and take your blood pressure once daily for two weeks, record values and follow up with your PCP. If you were started on blood pressure medication today, ensure you obtain a follow up appointment with your PCP in 5-7 days for a re-check, if " "you develop shortness of breath, severe headache, weakness, chest pain, vision problems after leaving urgent care, call 911 and go to the ER immediately.   Vitals:    10/21/24 1059 10/21/24 1143 10/21/24 1144 10/21/24 1145   BP: (!) 169/84 (!) 140/82 (!) 144/86 (!) 148/88   BP Location: Right arm      Patient Position: Sitting      Pulse: 60 64 82    Resp: 20      Temp: 97 °F (36.1 °C)      TempSrc: Oral      SpO2: 98%      Weight: 81 kg (178 lb 9.2 oz)      Height: 5' 2" (1.575 m)            Please remember that you have received care at an urgent care today. Urgent cares are not emergency rooms and are not equipped to handle life threatening emergencies and cannot rule in or out certain medical conditions and you may be released before all of your medical problems are known or treated. Please arrange follow up with your primary care physician or speciality clinic  within 2-5 days if your signs and symptoms have not resolved or worsen. Patient can call our Referral Hotline at [phone] to make an appointment.    Please return here or go to the Emergency Department for any concerns or worsening of condition.Patient was educated on signs/symptoms that would warrant emergent medical attention. Patient verbalized understanding.              "

## 2024-10-21 NOTE — PATIENT INSTRUCTIONS
Take Meclizine 12.5 to 25 mg po TID prn dizziness/vertigo. It can cause drowsiness.       Recommend oral antihistamine (Claritin/zyrtec) +/- oral decongestant (pseudoephedrine) for rhinorrhea/ear congestion if blood pressure <130/80mmhg, steroid nasal spray (flonase)     For nose bleeds; recommend nasal irrigation with a saline spray/gel (AYR nasal gel) or Netti Pot like device per their directions is also recommended.  Please drink plenty of fluids.  Please get plenty of rest.  If you  smoke, please stop smoking.      Discussed prescriptions and over-the-counter medicines to help with patient's symptoms:  A steroid nose spray (flonase) can help with a stuffy nose. It can also help with drainage down the back of your throat.  An antihistamine (loratadine,zyrtec,allegra, xyzal) can help with itching, sneezing, or runny nose.  An antihistamine eye drop can help with itchy eyes.  A decongestant (pseudoephedrine,  Phenylephrine) can help with a stuffy nose. Take <10 days for congestion and rhinorrhea. Once symptoms improve, proceed with loratadine/zyrtec once a day. These ingredients can keep you up all night, decrease appetite, feel jittery, and raise blood pressure with long term use.      If your blood pressure was elevated during your visit and this was discussed with you, please obtain a home blood pressure cuff and take your blood pressure once daily for two weeks, record values and follow up with your PCP. If you were started on blood pressure medication today, ensure you obtain a follow up appointment with your PCP in 5-7 days for a re-check, if you develop shortness of breath, severe headache, weakness, chest pain, vision problems after leaving urgent care, call 911 and go to the ER immediately.   Vitals:    10/21/24 1059 10/21/24 1143 10/21/24 1144 10/21/24 1145   BP: (!) 169/84 (!) 140/82 (!) 144/86 (!) 148/88   BP Location: Right arm      Patient Position: Sitting      Pulse: 60 64 82    Resp: 20      Temp:  "97 °F (36.1 °C)      TempSrc: Oral      SpO2: 98%      Weight: 81 kg (178 lb 9.2 oz)      Height: 5' 2" (1.575 m)            Please remember that you have received care at an urgent care today. Urgent cares are not emergency rooms and are not equipped to handle life threatening emergencies and cannot rule in or out certain medical conditions and you may be released before all of your medical problems are known or treated. Please arrange follow up with your primary care physician or speciality clinic  within 2-5 days if your signs and symptoms have not resolved or worsen. Patient can call our Referral Hotline at [phone] to make an appointment.    Please return here or go to the Emergency Department for any concerns or worsening of condition.Patient was educated on signs/symptoms that would warrant emergent medical attention. Patient verbalized understanding.    "

## 2024-10-21 NOTE — LETTER
"  October 21, 2024      [facility] [place] [address]  [place] [address]  Phone: [phone]  Fax: [phone]       Patient: Buffy Morin   YOB: 1960  Date of Visit: 10/21/2024    To Whom It May Concern:    Lokesh Morin  was at [facility] on 10/21/2024. The patient may return to work/school on 10/22/24 with no restrictions. If you have any questions or concerns, or if I can be of further assistance, please do not hesitate to contact me.    Sincerely,      Ora Bond PA-C (Jackie)       "

## 2024-11-03 DIAGNOSIS — M17.12 PRIMARY OSTEOARTHRITIS OF LEFT KNEE: ICD-10-CM

## 2024-11-03 RX ORDER — MELOXICAM 15 MG/1
15 TABLET ORAL
Qty: 30 TABLET | Refills: 2 | Status: SHIPPED | OUTPATIENT
Start: 2024-11-03

## 2024-11-12 ENCOUNTER — OFFICE VISIT (OUTPATIENT)
Dept: INTERNAL MEDICINE | Facility: CLINIC | Age: 64
End: 2024-11-12
Payer: COMMERCIAL

## 2024-11-12 VITALS
HEIGHT: 62 IN | BODY MASS INDEX: 31.1 KG/M2 | DIASTOLIC BLOOD PRESSURE: 65 MMHG | WEIGHT: 169 LBS | SYSTOLIC BLOOD PRESSURE: 120 MMHG

## 2024-11-12 DIAGNOSIS — R73.01 IFG (IMPAIRED FASTING GLUCOSE): ICD-10-CM

## 2024-11-12 DIAGNOSIS — E66.811 CLASS 1 OBESITY DUE TO EXCESS CALORIES WITHOUT SERIOUS COMORBIDITY WITH BODY MASS INDEX (BMI) OF 30.0 TO 30.9 IN ADULT: ICD-10-CM

## 2024-11-12 DIAGNOSIS — I10 ESSENTIAL HYPERTENSION: ICD-10-CM

## 2024-11-12 DIAGNOSIS — R42 LIGHTHEADEDNESS: Primary | ICD-10-CM

## 2024-11-12 DIAGNOSIS — E66.09 CLASS 1 OBESITY DUE TO EXCESS CALORIES WITHOUT SERIOUS COMORBIDITY WITH BODY MASS INDEX (BMI) OF 30.0 TO 30.9 IN ADULT: ICD-10-CM

## 2024-11-12 DIAGNOSIS — R35.0 URINARY FREQUENCY: ICD-10-CM

## 2024-11-12 PROCEDURE — 99214 OFFICE O/P EST MOD 30 MIN: CPT | Mod: S$GLB,,, | Performed by: INTERNAL MEDICINE

## 2024-11-12 PROCEDURE — 3008F BODY MASS INDEX DOCD: CPT | Mod: CPTII,S$GLB,, | Performed by: INTERNAL MEDICINE

## 2024-11-12 PROCEDURE — 81001 URINALYSIS AUTO W/SCOPE: CPT | Performed by: INTERNAL MEDICINE

## 2024-11-12 PROCEDURE — 1126F AMNT PAIN NOTED NONE PRSNT: CPT | Mod: CPTII,S$GLB,, | Performed by: INTERNAL MEDICINE

## 2024-11-12 PROCEDURE — 3078F DIAST BP <80 MM HG: CPT | Mod: CPTII,S$GLB,, | Performed by: INTERNAL MEDICINE

## 2024-11-12 PROCEDURE — 3044F HG A1C LEVEL LT 7.0%: CPT | Mod: CPTII,S$GLB,, | Performed by: INTERNAL MEDICINE

## 2024-11-12 PROCEDURE — 99999 PR PBB SHADOW E&M-EST. PATIENT-LVL IV: CPT | Mod: PBBFAC,,, | Performed by: INTERNAL MEDICINE

## 2024-11-12 PROCEDURE — 3074F SYST BP LT 130 MM HG: CPT | Mod: CPTII,S$GLB,, | Performed by: INTERNAL MEDICINE

## 2024-11-12 PROCEDURE — 1159F MED LIST DOCD IN RCRD: CPT | Mod: CPTII,S$GLB,, | Performed by: INTERNAL MEDICINE

## 2024-11-12 PROCEDURE — G2211 COMPLEX E/M VISIT ADD ON: HCPCS | Mod: S$GLB,,, | Performed by: INTERNAL MEDICINE

## 2024-11-12 PROCEDURE — 1160F RVW MEDS BY RX/DR IN RCRD: CPT | Mod: CPTII,S$GLB,, | Performed by: INTERNAL MEDICINE

## 2024-11-12 RX ORDER — AMLODIPINE BESYLATE 10 MG/1
5 TABLET ORAL DAILY
Qty: 45 TABLET | Refills: 3 | Status: SHIPPED | OUTPATIENT
Start: 2024-11-12

## 2024-11-12 NOTE — PROGRESS NOTES
Patient ID: Buffy Morin is a 64 y.o. female.    Chief Complaint: Ear Fullness and Dizziness      Assessment:       1. Lightheadedness    2. Essential hypertension    3. IFG (impaired fasting glucose)    4. Class 1 obesity due to excess calories without serious comorbidity with body mass index (BMI) of 30.0 to 30.9 in adult    5. Urinary frequency          Plan:           1. Lightheadedness    2. Essential hypertension  -     amLODIPine (NORVASC) 10 MG tablet; Take 0.5 tablets (5 mg total) by mouth once daily.  Dispense: 45 tablet; Refill: 3    3. IFG (impaired fasting glucose)  -     Hemoglobin A1C; Future; Expected date: 11/12/2024    4. Class 1 obesity due to excess calories without serious comorbidity with body mass index (BMI) of 30.0 to 30.9 in adult    5. Urinary frequency  -     Urinalysis, Reflex to Urine Culture Urine, Clean Catch       Etiology lightheaded this is unclear, but BP has been trending downward as has her weight  Reduce amlodipine to 5 mg daily, monitor BP and let me know readings in the next week  If BP elevated over 130/80, would have her take amlodipine 5 mg twice daily  Continue with low-sodium eating, sleep hygiene, exercise and slow and steady weight loss  Alarm symptoms and ED cautions reviewed, she is having none  She declines immunizations  She needs to schedule DEXA scan  Urine today  Repeat labs in 3 months    Visit today manifests increased complexity associated with the care of the multiple chronic and episodic problems I addressed.  I am managing a longitudinal care plan of the patient due to the serious and complex problems listed above.    Subjective:      CHIEF COMPLAINT:  Patient presents for follow-up after an urgent care visit for lightheadedness and to discuss ongoing symptoms.    HPI:  Patient reports lightheadedness, which prompted a visit to urgent care prior to this appointment. At urgent care, she was diagnosed with fluid in her right ear and prescribed multiple  medications, including a nasal medication, medication for nausea and dizziness, and another medication causing drowsiness and fatigue. Her symptoms have improved but not fully resolved. She continues to have mild lightheadedness, particularly with movement, but not while sitting.    Patient denies dizziness but describes a sensation of feeling light. During episodes of lightheadedness, her blood pressure was elevated to 140-145. She reports increased urination frequency, approximately every 3 hours, which she attributes to increased fluid intake.    Patient has lost about 10 lbs after consulting a dietitian. She has implemented lifestyle changes, including home cooking, avoiding fast food, and increasing exercise. Her exercise regimen includes using an indoor stationary bike, outdoor cycling, and walking.    Patient denies nausea, vomiting, diarrhea, hematochezia, chest pain, jaw pain, arm pain, tightness, and dysuria.    MEDICATIONS:  Patient is on Amlodipine 10 mg daily for blood pressure management. She is also taking Claritin and a nasal medication for allergies and fluid in the ear. She is on medication for nausea and dizziness, as well as a medication that causes drowsiness and fatigue. Patient is not on diabetes medication.    MEDICAL HISTORY:  Patient has a history of hypertension and pre-diabetes.    TEST RESULTS:  A few weeks ago, the patient underwent several tests. Her thyroid test, liver function test, kidney function test, and cholesterol test were all within normal limits. The blood sugar test showed elevated levels. The anemia test revealed that the patient is not anemic. Patient's EKG results were within normal limits.      ROS:  General: -fever, -chills, -fatigue, -weight gain, -weight loss.  Some vague lightheadedness, rarely  Eyes: -vision changes, -redness, -discharge  ENT: -ear pain, -nasal congestion, -sore throat  Cardiovascular: -chest pain, -palpitations, -lower extremity  edema  Respiratory: -cough, -shortness of breath  Gastrointestinal: -abdominal pain, -nausea, -vomiting, -diarrhea, -constipation, -blood in stool  Genitourinary: -dysuria, -hematuria, +frequency  Musculoskeletal: -joint pain, -muscle pain  Skin: -rash, -lesion  Neurological: -headache, -dizziness, -numbness, -tingling  Psychiatric: -anxiety, -depression, -sleep difficulty         Patient Active Problem List   Diagnosis    Anxiety    Carpal tunnel syndrome    Mild vitamin D deficiency    Mixed hyperlipidemia    Family history of breast cancer in sister    Essential hypertension    S/P hysterectomy: complete    Lumbar radiculopathy    IFG (impaired fasting glucose)    Class 1 obesity due to excess calories without serious comorbidity with body mass index (BMI) of 30.0 to 30.9 in adult    Cervical radiculopathy        Review of Systems      Objective:      Physical Exam  Vitals and nursing note reviewed.   Constitutional:       Appearance: She is well-developed.   HENT:      Head: Normocephalic and atraumatic.      Right Ear: External ear normal.      Left Ear: External ear normal.      Nose: Nose normal.      Mouth/Throat:      Pharynx: No oropharyngeal exudate.   Eyes:      General: No scleral icterus.     Extraocular Movements: Extraocular movements intact.      Conjunctiva/sclera: Conjunctivae normal.   Neck:      Thyroid: No thyromegaly.      Vascular: No JVD.   Cardiovascular:      Rate and Rhythm: Normal rate and regular rhythm.      Heart sounds: Normal heart sounds. No murmur heard.     No gallop.   Pulmonary:      Effort: Pulmonary effort is normal. No respiratory distress.      Breath sounds: Normal breath sounds. No wheezing.   Abdominal:      General: Bowel sounds are normal. There is no distension.      Palpations: Abdomen is soft. There is no mass.      Tenderness: There is no abdominal tenderness. There is no guarding or rebound.   Musculoskeletal:         General: No tenderness. Normal range of  motion.      Cervical back: Normal range of motion and neck supple.   Lymphadenopathy:      Cervical: No cervical adenopathy.   Skin:     General: Skin is warm.      Findings: No erythema or rash.   Neurological:      General: No focal deficit present.      Mental Status: She is alert and oriented to person, place, and time.      Cranial Nerves: No cranial nerve deficit.      Coordination: Coordination normal.   Psychiatric:         Behavior: Behavior normal.         Thought Content: Thought content normal.         Judgment: Judgment normal.             Health Maintenance Due   Topic Date Due    DEXA Scan  Never done

## 2024-11-13 LAB
BACTERIA #/AREA URNS AUTO: NORMAL /HPF
BILIRUB UR QL STRIP: NEGATIVE
CLARITY UR REFRACT.AUTO: CLEAR
COLOR UR AUTO: YELLOW
GLUCOSE UR QL STRIP: NEGATIVE
HGB UR QL STRIP: NEGATIVE
KETONES UR QL STRIP: NEGATIVE
LEUKOCYTE ESTERASE UR QL STRIP: ABNORMAL
MICROSCOPIC COMMENT: NORMAL
NITRITE UR QL STRIP: NEGATIVE
PH UR STRIP: 6 [PH] (ref 5–8)
PROT UR QL STRIP: ABNORMAL
RBC #/AREA URNS AUTO: 1 /HPF (ref 0–4)
SP GR UR STRIP: 1.03 (ref 1–1.03)
SQUAMOUS #/AREA URNS AUTO: 4 /HPF
URN SPEC COLLECT METH UR: ABNORMAL
WBC #/AREA URNS AUTO: 2 /HPF (ref 0–5)

## 2024-12-16 ENCOUNTER — TELEPHONE (OUTPATIENT)
Dept: INTERNAL MEDICINE | Facility: CLINIC | Age: 64
End: 2024-12-16
Payer: COMMERCIAL

## 2025-01-29 ENCOUNTER — OFFICE VISIT (OUTPATIENT)
Dept: CARDIOLOGY | Facility: CLINIC | Age: 65
End: 2025-01-29
Payer: COMMERCIAL

## 2025-01-29 VITALS
DIASTOLIC BLOOD PRESSURE: 82 MMHG | BODY MASS INDEX: 32.01 KG/M2 | HEIGHT: 62 IN | OXYGEN SATURATION: 95 % | WEIGHT: 173.94 LBS | HEART RATE: 71 BPM | SYSTOLIC BLOOD PRESSURE: 154 MMHG

## 2025-01-29 DIAGNOSIS — E78.2 MIXED HYPERLIPIDEMIA: ICD-10-CM

## 2025-01-29 DIAGNOSIS — I10 ESSENTIAL HYPERTENSION: Primary | ICD-10-CM

## 2025-01-29 PROCEDURE — 3079F DIAST BP 80-89 MM HG: CPT | Mod: CPTII,S$GLB,, | Performed by: STUDENT IN AN ORGANIZED HEALTH CARE EDUCATION/TRAINING PROGRAM

## 2025-01-29 PROCEDURE — 3288F FALL RISK ASSESSMENT DOCD: CPT | Mod: CPTII,S$GLB,, | Performed by: STUDENT IN AN ORGANIZED HEALTH CARE EDUCATION/TRAINING PROGRAM

## 2025-01-29 PROCEDURE — 3077F SYST BP >= 140 MM HG: CPT | Mod: CPTII,S$GLB,, | Performed by: STUDENT IN AN ORGANIZED HEALTH CARE EDUCATION/TRAINING PROGRAM

## 2025-01-29 PROCEDURE — 1159F MED LIST DOCD IN RCRD: CPT | Mod: CPTII,S$GLB,, | Performed by: STUDENT IN AN ORGANIZED HEALTH CARE EDUCATION/TRAINING PROGRAM

## 2025-01-29 PROCEDURE — 1126F AMNT PAIN NOTED NONE PRSNT: CPT | Mod: CPTII,S$GLB,, | Performed by: STUDENT IN AN ORGANIZED HEALTH CARE EDUCATION/TRAINING PROGRAM

## 2025-01-29 PROCEDURE — 99999 PR PBB SHADOW E&M-EST. PATIENT-LVL III: CPT | Mod: PBBFAC,,, | Performed by: STUDENT IN AN ORGANIZED HEALTH CARE EDUCATION/TRAINING PROGRAM

## 2025-01-29 PROCEDURE — 3008F BODY MASS INDEX DOCD: CPT | Mod: CPTII,S$GLB,, | Performed by: STUDENT IN AN ORGANIZED HEALTH CARE EDUCATION/TRAINING PROGRAM

## 2025-01-29 PROCEDURE — 1101F PT FALLS ASSESS-DOCD LE1/YR: CPT | Mod: CPTII,S$GLB,, | Performed by: STUDENT IN AN ORGANIZED HEALTH CARE EDUCATION/TRAINING PROGRAM

## 2025-01-29 PROCEDURE — 1160F RVW MEDS BY RX/DR IN RCRD: CPT | Mod: CPTII,S$GLB,, | Performed by: STUDENT IN AN ORGANIZED HEALTH CARE EDUCATION/TRAINING PROGRAM

## 2025-01-29 PROCEDURE — 99205 OFFICE O/P NEW HI 60 MIN: CPT | Mod: S$GLB,,, | Performed by: STUDENT IN AN ORGANIZED HEALTH CARE EDUCATION/TRAINING PROGRAM

## 2025-01-29 NOTE — PROGRESS NOTES
Cardiology Clinic Visit    History of Present Illness:       Buffy Morin is a pleasant 64 y.o. female with PMHx of HTN and HLD here to establish care. Per patient she does not know why she is here and very nervous. Usually her BP is well controlled which is supported by her ECG without LVH. She is compliant with her meds w/o s/e. Voiced no CV complaints. Denies cp, sob, palpitations, presyncope/dizziness, syncope, orthopnea, PND, bendopnea, decrease ET, NVDC, fever, chills.        History obtained by patient interview and supplemented by nursing documentation and chart review.   PMHx:  has a past medical history of Abnormal Pap smear of cervix, Anxiety, At high risk for breast cancer: TC 7 > 20% 2019 (2/6/2019), Carpal tunnel syndrome, Elevated blood pressure, Family history of breast cancer in sister (10/1/2013), Hypertension, and S/P hysterectomy (9/23/2017).   SurgHx:  has a past surgical history that includes Tubal ligation; Breast biopsy (Right); Hysterectomy; Colonoscopy (N/A, 8/19/2019); Transforaminal epidural injection of steroid (Bilateral, 12/23/2019); Transforaminal epidural injection of steroid (Bilateral, 1/16/2020); and Epidural steroid injection into cervical spine (N/A, 8/3/2022).   FamHx: family history includes Anxiety disorder in her sister; Breast cancer in her sister; Cancer in her mother and sister; Depression in her sister; Hypertension in her father and mother; No Known Problems in her son and son; Thyroid disease in her sister.   SocialHx:  reports that she has never smoked. She has never used smokeless tobacco. She reports current alcohol use. She reports that she does not use drugs.  Home Meds:  Current Outpatient Medications   Medication Instructions    amLODIPine (NORVASC) 5 mg, Oral, Daily    atorvastatin (LIPITOR) 80 mg, Oral, Daily    cholecalciferol, vitamin D3, (VITAMIN D3) 50 mcg (2,000 unit) Cap 1 capsule, Daily    diclofenac sodium (VOLTAREN) 1 % Gel Apply two grams to  "affected area 3-4 times as needed    EScitalopram oxalate (LEXAPRO) 20 mg, Oral, Daily    loratadine (CLARITIN) 10 mg, Oral, Daily PRN    LORazepam (ATIVAN) 0.5 mg, Oral, Daily PRN    meclizine (ANTIVERT) 12.5 mg, Oral, 3 times daily PRN    meloxicam (MOBIC) 15 mg, Oral       Review of Systems: Comprehensive ROS was performed and is negative unless otherwise noted in HPI.   Objective   Objective/Exam:   BP (!) 154/82 (BP Location: Right arm, Patient Position: Sitting)   Pulse 71   Ht 5' 2" (1.575 m)   Wt 78.9 kg (173 lb 15.1 oz)   SpO2 95%   BMI 31.81 kg/m²    Wt Readings from Last 4 Encounters:   01/29/25 78.9 kg (173 lb 15.1 oz)   11/12/24 76.7 kg (169 lb)   10/21/24 81 kg (178 lb 9.2 oz)   09/26/24 (P) 81.1 kg (178 lb 12.7 oz)      Constitutional: NAD, Atraumatic, Conversant   HEENT: MMM, Sclera anicteric, No JVD   Cardiovascular: RRR, no murmurs noted, no chest tenderness to palpation, 2+ radial pulses b/l  Pulmonary: normal respiratory effort, CTAB, no crackles, wheezes  Abdominal: S/NT/ND  Musculoskeletal: No lower extremity edema noted b/l  Neurological: No gross neurological deficits  Skin: W/D/I  Psych: Appropriate affect, normal mood  Labs/Imaging/Procedures   Personally reviewed  Lab Results   Component Value Date     08/16/2024    K 4.4 08/16/2024     08/16/2024    CO2 24 08/16/2024    BUN 11 08/16/2024    CREATININE 0.9 08/16/2024    ANIONGAP 10 08/16/2024     Lab Results   Component Value Date    HGBA1C 6.3 (H) 08/16/2024     Lab Results   Component Value Date    BNP 14 10/12/2021      Lab Results   Component Value Date    WBC 5.87 08/16/2024    HGB 12.8 08/16/2024    HCT 41.4 08/16/2024     08/16/2024    GRAN 2.6 08/16/2024    GRAN 44.2 08/16/2024     Lab Results   Component Value Date    CHOL 143 08/16/2024    HDL 57 08/16/2024    LDLCALC 76.8 08/16/2024    LDLCALC 66.2 04/24/2023    LDLCALC 66.2 10/24/2022    TRIG 46 08/16/2024     Lab Results   Component Value Date    TSH " "1.336 08/16/2024     No results found for: "APOLIPOPROTE"  No results found for: "LIPOA"     TTE:  No results found for this or any previous visit.    Stress Testing:   No results found for this or any previous visit.     Coronary Angiogram:  No results found for this or any previous visit.    -Reviewing Medical records & lab results  -Independently reviewing and interpreting (if not documented by myself) EKGs, echocardiograms, catherizations   -Obtaining a history, performing a relevant exam, counseling/educating the patient/family  -Documenting clinical information in the EMR including ordering of tests  -Care coordination and communicating with other health care providers       Problem List:     1. Essential hypertension    2. Mixed hyperlipidemia      Assessment/Plan:     Buffy Morin is a pleasant 64 y.o. female with PMHx of HTN and HLD here to establish care. Patient reports her BP is usually controlled at home and her BP is high when she goes to the doctor. ECG supporting BP per patient without LVH. Will order echocardiogram to rule out any LVH or structural problems. Continue current medications regimen CCB and statin. Will benefit of digital HTN. RTC 6 months.       Preventative Care:  Lipids:  PVD(V/A)      Patient expressed verbal understanding and agreed with the plan     Return sooner for concerns or questions. If symptoms persist go to the ED.  I have reviewed all pertinent data including patient's medical history in detail and updated the computerized patient record.       Total time spent counseling greater than fifty percent of total visit time.  Counseling included discussion regarding imaging findings, diagnosis, possibilities, treatment options, risks and benefits.      Thank you for the opportunity to care for this patient. Please don't hesitate to reach out with any questions/concerns         Esdras Thakur MD  Cardiovascular Disease; Interventional Cardiology  [facility]      "

## 2025-02-14 DIAGNOSIS — M17.12 PRIMARY OSTEOARTHRITIS OF LEFT KNEE: ICD-10-CM

## 2025-02-14 RX ORDER — MELOXICAM 15 MG/1
15 TABLET ORAL
Qty: 30 TABLET | Refills: 2 | Status: SHIPPED | OUTPATIENT
Start: 2025-02-14

## 2025-02-20 ENCOUNTER — HOSPITAL ENCOUNTER (OUTPATIENT)
Dept: CARDIOLOGY | Facility: HOSPITAL | Age: 65
Discharge: HOME | End: 2025-02-20
Attending: STUDENT IN AN ORGANIZED HEALTH CARE EDUCATION/TRAINING PROGRAM
Payer: COMMERCIAL

## 2025-02-20 VITALS — WEIGHT: 173 LBS | HEIGHT: 62 IN | BODY MASS INDEX: 31.83 KG/M2

## 2025-02-20 DIAGNOSIS — E78.2 MIXED HYPERLIPIDEMIA: ICD-10-CM

## 2025-02-20 DIAGNOSIS — I10 ESSENTIAL HYPERTENSION: ICD-10-CM

## 2025-02-20 LAB
APICAL FOUR CHAMBER EJECTION FRACTION: 57 %
APICAL TWO CHAMBER EJECTION FRACTION: 60 %
ASCENDING AORTA: 3.03 CM
AV INDEX (PROSTH): 0.74
AV MEAN GRADIENT: 5 MMHG
AV PEAK GRADIENT: 9 MMHG
AV VALVE AREA BY VELOCITY RATIO: 2.1 CM²
AV VALVE AREA: 2.3 CM²
AV VELOCITY RATIO: 0.67
BSA FOR ECHO PROCEDURE: 1.85 M2
CV ECHO LV RWT: 0.45 CM
DOP CALC AO PEAK VEL: 1.5 M/S
DOP CALC AO VTI: 30.8 CM
DOP CALC LVOT AREA: 3.1 CM2
DOP CALC LVOT DIAMETER: 2 CM
DOP CALC LVOT PEAK VEL: 1 M/S
DOP CALC LVOT STROKE VOLUME: 71.6 CM3
DOP CALC MV VTI: 26.6 CM
DOP CALCLVOT PEAK VEL VTI: 22.8 CM
E WAVE DECELERATION TIME: 179 MSEC
E/A RATIO: 0.84
E/E' RATIO: 11 M/S
ECHO LV POSTERIOR WALL: 0.9 CM (ref 0.6–1.1)
FRACTIONAL SHORTENING: 27.5 % (ref 28–44)
HR MV ECHO: 71 BPM
INTERVENTRICULAR SEPTUM: 0.9 CM (ref 0.6–1.1)
IVC DIAMETER: 1.88 CM
LEFT ATRIUM AREA SYSTOLIC (APICAL 2 CHAMBER): 13.16 CM2
LEFT ATRIUM AREA SYSTOLIC (APICAL 4 CHAMBER): 14.94 CM2
LEFT ATRIUM VOLUME INDEX MOD: 18 ML/M2
LEFT ATRIUM VOLUME MOD: 32 ML
LEFT INTERNAL DIMENSION IN SYSTOLE: 2.9 CM (ref 2.1–4)
LEFT VENTRICLE DIASTOLIC VOLUME INDEX: 39.06 ML/M2
LEFT VENTRICLE DIASTOLIC VOLUME: 70.3 ML
LEFT VENTRICLE END DIASTOLIC VOLUME APICAL 2 CHAMBER: 42.73 ML
LEFT VENTRICLE END DIASTOLIC VOLUME APICAL 4 CHAMBER: 43.55 ML
LEFT VENTRICLE END SYSTOLIC VOLUME APICAL 2 CHAMBER: 28.85 ML
LEFT VENTRICLE END SYSTOLIC VOLUME APICAL 4 CHAMBER: 33.82 ML
LEFT VENTRICLE MASS INDEX: 60.9 G/M2
LEFT VENTRICLE SYSTOLIC VOLUME INDEX: 17.4 ML/M2
LEFT VENTRICLE SYSTOLIC VOLUME: 31.39 ML
LEFT VENTRICULAR INTERNAL DIMENSION IN DIASTOLE: 4 CM (ref 3.5–6)
LEFT VENTRICULAR MASS: 109.7 G
LV LATERAL E/E' RATIO: 10.6 M/S
LV SEPTAL E/E' RATIO: 12.3 M/S
LVED V (TEICH): 70.3 ML
LVES V (TEICH): 31.39 ML
LVOT MG: 2.34 MMHG
LVOT MV: 0.72 CM/S
MV MEAN GRADIENT: 2 MMHG
MV PEAK A VEL: 0.88 M/S
MV PEAK E VEL: 0.74 M/S
MV PEAK GRADIENT: 5 MMHG
MV STENOSIS PRESSURE HALF TIME: 51.94 MS
MV VALVE AREA BY CONTINUITY EQUATION: 2.69 CM2
MV VALVE AREA P 1/2 METHOD: 4.24 CM2
OHS CV RV/LV RATIO: 0.73 CM
OHS LV EJECTION FRACTION SIMPSONS BIPLANE MOD: 58 %
PISA MRMAX VEL: 3.55 M/S
PISA TR MAX VEL: 2.4 M/S
RA PRESSURE ESTIMATED: 3 MMHG
RA VOL SYS: 16.34 ML
RIGHT ATRIAL AREA: 9.8 CM2
RIGHT ATRIUM VOLUME AREA LENGTH APICAL 4 CHAMBER: 16.54 ML
RIGHT VENTRICLE DIASTOLIC BASEL DIMENSION: 2.9 CM
RV TB RVSP: 5 MMHG
RV TISSUE DOPPLER FREE WALL SYSTOLIC VELOCITY 1 (APICAL 4 CHAMBER VIEW): 14.62 CM/S
SINUS: 3 CM
STJ: 2.34 CM
TDI LATERAL: 0.07 M/S
TDI SEPTAL: 0.06 M/S
TDI: 0.07 M/S
TR MAX PG: 23 MMHG
TRICUSPID ANNULAR PLANE SYSTOLIC EXCURSION: 1.7 CM
TV REST PULMONARY ARTERY PRESSURE: 26 MMHG
Z-SCORE OF LEFT VENTRICULAR DIMENSION IN END DIASTOLE: -2.19
Z-SCORE OF LEFT VENTRICULAR DIMENSION IN END SYSTOLE: -0.47

## 2025-02-20 PROCEDURE — C8929 TTE W OR WO FOL WCON,DOPPLER: HCPCS

## 2025-02-23 ENCOUNTER — RESULTS FOLLOW-UP (OUTPATIENT)
Dept: CARDIOLOGY | Facility: CLINIC | Age: 65
End: 2025-02-23

## 2025-02-25 ENCOUNTER — TELEPHONE (OUTPATIENT)
Dept: CARDIOLOGY | Facility: CLINIC | Age: 65
End: 2025-02-25
Payer: COMMERCIAL

## 2025-02-25 ENCOUNTER — PATIENT MESSAGE (OUTPATIENT)
Dept: CARDIOLOGY | Facility: CLINIC | Age: 65
End: 2025-02-25
Payer: COMMERCIAL

## 2025-02-25 NOTE — TELEPHONE ENCOUNTER
----- Message from Mary sent at 2/25/2025  9:25 AM CST -----  Regarding: self  Type:  Test Results Who Called:self Name of Test (Lab/Mammo/Etc):echo Date of Test:2/20 Where the test was performed[facility] Would the patient rather a call back or a response via [facility]?call Best Call Back Number: [phone] Additional Information:   For Clinical Team:Has the provider reviewed the results?

## 2025-03-12 ENCOUNTER — NURSE TRIAGE (OUTPATIENT)
Dept: ADMINISTRATIVE | Facility: CLINIC | Age: 65
End: 2025-03-12
Payer: COMMERCIAL

## 2025-03-12 ENCOUNTER — PATIENT OUTREACH (OUTPATIENT)
Facility: OTHER | Age: 65
End: 2025-03-12
Payer: COMMERCIAL

## 2025-03-12 ENCOUNTER — OFFICE VISIT (OUTPATIENT)
Dept: PAIN MEDICINE | Facility: CLINIC | Age: 65
End: 2025-03-12
Payer: COMMERCIAL

## 2025-03-12 ENCOUNTER — OCHSNER VIRTUAL EMERGENCY DEPARTMENT (OUTPATIENT)
Facility: CLINIC | Age: 65
End: 2025-03-12
Payer: COMMERCIAL

## 2025-03-12 VITALS
HEIGHT: 62 IN | RESPIRATION RATE: 18 BRPM | BODY MASS INDEX: 31.24 KG/M2 | OXYGEN SATURATION: 100 % | SYSTOLIC BLOOD PRESSURE: 139 MMHG | TEMPERATURE: 98 F | DIASTOLIC BLOOD PRESSURE: 82 MMHG | HEART RATE: 76 BPM | WEIGHT: 169.75 LBS

## 2025-03-12 DIAGNOSIS — M54.16 LUMBAR RADICULOPATHY: Primary | ICD-10-CM

## 2025-03-12 DIAGNOSIS — M54.42 ACUTE LEFT-SIDED LOW BACK PAIN WITH LEFT-SIDED SCIATICA: Primary | ICD-10-CM

## 2025-03-12 DIAGNOSIS — M54.12 CERVICAL RADICULOPATHY: ICD-10-CM

## 2025-03-12 DIAGNOSIS — M50.30 DDD (DEGENERATIVE DISC DISEASE), CERVICAL: ICD-10-CM

## 2025-03-12 PROCEDURE — 1160F RVW MEDS BY RX/DR IN RCRD: CPT | Mod: CPTII,S$GLB,, | Performed by: STUDENT IN AN ORGANIZED HEALTH CARE EDUCATION/TRAINING PROGRAM

## 2025-03-12 PROCEDURE — 1125F AMNT PAIN NOTED PAIN PRSNT: CPT | Mod: CPTII,S$GLB,, | Performed by: STUDENT IN AN ORGANIZED HEALTH CARE EDUCATION/TRAINING PROGRAM

## 2025-03-12 PROCEDURE — 99999 PR PBB SHADOW E&M-EST. PATIENT-LVL IV: CPT | Mod: PBBFAC,,, | Performed by: STUDENT IN AN ORGANIZED HEALTH CARE EDUCATION/TRAINING PROGRAM

## 2025-03-12 PROCEDURE — G2211 COMPLEX E/M VISIT ADD ON: HCPCS | Mod: S$GLB,,, | Performed by: STUDENT IN AN ORGANIZED HEALTH CARE EDUCATION/TRAINING PROGRAM

## 2025-03-12 PROCEDURE — 99214 OFFICE O/P EST MOD 30 MIN: CPT | Mod: S$GLB,,, | Performed by: STUDENT IN AN ORGANIZED HEALTH CARE EDUCATION/TRAINING PROGRAM

## 2025-03-12 PROCEDURE — 3008F BODY MASS INDEX DOCD: CPT | Mod: CPTII,S$GLB,, | Performed by: STUDENT IN AN ORGANIZED HEALTH CARE EDUCATION/TRAINING PROGRAM

## 2025-03-12 PROCEDURE — 3079F DIAST BP 80-89 MM HG: CPT | Mod: CPTII,S$GLB,, | Performed by: STUDENT IN AN ORGANIZED HEALTH CARE EDUCATION/TRAINING PROGRAM

## 2025-03-12 PROCEDURE — 3288F FALL RISK ASSESSMENT DOCD: CPT | Mod: CPTII,S$GLB,, | Performed by: STUDENT IN AN ORGANIZED HEALTH CARE EDUCATION/TRAINING PROGRAM

## 2025-03-12 PROCEDURE — 1159F MED LIST DOCD IN RCRD: CPT | Mod: CPTII,S$GLB,, | Performed by: STUDENT IN AN ORGANIZED HEALTH CARE EDUCATION/TRAINING PROGRAM

## 2025-03-12 PROCEDURE — 3075F SYST BP GE 130 - 139MM HG: CPT | Mod: CPTII,S$GLB,, | Performed by: STUDENT IN AN ORGANIZED HEALTH CARE EDUCATION/TRAINING PROGRAM

## 2025-03-12 PROCEDURE — 1101F PT FALLS ASSESS-DOCD LE1/YR: CPT | Mod: CPTII,S$GLB,, | Performed by: STUDENT IN AN ORGANIZED HEALTH CARE EDUCATION/TRAINING PROGRAM

## 2025-03-12 RX ORDER — LIDOCAINE 50 MG/G
1 PATCH TOPICAL DAILY
Qty: 30 PATCH | Refills: 2 | Status: SHIPPED | OUTPATIENT
Start: 2025-03-12

## 2025-03-12 RX ORDER — DULOXETIN HYDROCHLORIDE 30 MG/1
30 CAPSULE, DELAYED RELEASE ORAL DAILY
Qty: 30 CAPSULE | Refills: 2 | Status: SHIPPED | OUTPATIENT
Start: 2025-03-12

## 2025-03-12 NOTE — PROGRESS NOTES
Chronic patient Established Note (Follow up visit)      SUBJECTIVE:    INTERVAL HISTORY 3/12/2025:  Buffy Morin presents to the clinic for a follow-up appointment for chronic pain. Current pain intensity is 7/10.  Since the last visit, Buffy Morin states:  she has started to have recurrence of her left leg pain.  She denies any right lower leg pain, but she admits to bilateral thigh pain.  She has been taking meloxicam for the pain, which has also been helping with her sciatica pain.  She has tried a OTC patch which was helping, but it's been 2 weeks since the pain started.    11/8/2023- Buffy Morin is a 63 y.o. female who  has a past medical history of Abnormal Pap smear of cervix, Anxiety, At high risk for breast cancer: TC 7 > 20% 2019 (2/6/2019), Carpal tunnel syndrome, Elevated blood pressure, Family history of breast cancer in sister (10/1/2013), Hypertension, and S/P hysterectomy (9/23/2017).  By history and examination this patient has chronic neck and shoulder  pain without  radiculopathy in the distribution of C4 and C5.  The underlying cause cause is facet arthritis, degenerative disc disease, foraminal stenosis, central canal stenosis, and muscle dysfunction.  Pathology is confirmed by imaging.  We discussed the underlying diagnoses and multiple treatment options including non-opioid medications, interventional procedures, physical therapy, home exercise, activity modification, and weight loss.  The risks and benefits of each treatment option were discussed and all questions were answered.        PLAN:  Complete PT as this has helped her pain  Continue Meloxicam  Follow up 3-4 months or sooner if needed  Could consider a PELON in the future.      Vamshi Guillen NP-C  Interventional Pain Management    PRIOR HISTORY (Dr. Cooney):   The pain is located in the shoulders and doesn't radiate. The pain is described as aching. Exacerbating factors: Lifting. Mitigating factors heat. Symptoms interfere  with sleep, work. The patient feels like symptoms have been improving. Patient denies night fever/night sweats, urinary incontinence, bowel incontinence, significant weight loss, significant motor weakness, and loss of sensations.    Original PAIN SCORES:  Best: Pain is 4  Worst: Pain is 8  Current: Pain is 4    INTERVAL HISTORY: (Newest visit at the bottom)   Interval History (Date):   11/8/2023- patient presents for a follow up for bilateral shoulder pain that doesn't radiate. She has been attending PT which has helped her pain and improved  her functionality.  She denies any new pain, denies any profound weakness denies any B/B dysfunction.     Pain Disability Index Review:      3/12/2025     3:14 PM 11/8/2023     2:53 PM 9/20/2023     2:46 PM   Last 3 PDI Scores   Pain Disability Index (PDI) 56 8 40       Pain Medications:   - meloxicam 15mg    Opioid Contract: not applicable     report:  Reviewed and consistent with medication use as prescribed.    Pain Procedures:   12/23/19 - B/L L4/5 TFESI (Eshraghi)  1/16/20 - B/L L4/5 TFESI (Eissa)  8/3/22 - C7/T1 PELON    Physical Therapy/Home Exercise: no, does not recall therapy for her back in the past    Imaging:   MRI LUMBAR SPINE WITHOUT CONTRAST     CLINICAL HISTORY:  lumbar radiculopathy; Spondylolisthesis, lumbar region     TECHNIQUE:  Multiplanar, multisequence MR images were acquired from the thoracolumbar junction to the sacrum without the administration of contrast.     COMPARISON:  None.     FINDINGS:  There is minimal grade I anterolisthesis of L4 on L5.  Lumbar spine alignment otherwise appears within normal limits.  Lumbar vertebral body heights are appropriately maintained.  There is mild chronic anterior wedging of the T12 vertebral body may be degenerative in etiology, noting prominent chronic endplate degenerative changes at T11-T12. There is no evidence of diffuse bone marrow placement process.  There is disc desiccation at L3-L4 and L4-L5.  The  conus is normal in appearance and terminates at the L2 level.     T11-T12: There is a broad-based disc bulge with effacement of the anterior thecal sac and at most mild spinal canal narrowing.  There is mild right-sided neural foraminal narrowing.     T12-L1: There is no focal disc herniation.  No significant spinal canal or neural foraminal narrowing.     L1-L2: There is mild bilateral facet arthropathy.  No significant spinal canal or neural foraminal narrowing.     L2-L3: There is a mild circumferential disc bulge, ligamentum flavum thickening, and mild bilateral facet arthropathy.  No significant spinal canal or neural foraminal narrowing.     L3-L4: There is a circumferential disc bulge, ligamentum flavum thickening, and bilateral facet arthropathy.  There is mild spinal canal stenosis.  There is moderate left-sided and mild right-sided neural foraminal narrowing.     L4-L5: There is a circumferential disc bulge with superimposed small central disc protrusion.  There is ligamentum flavum thickening and advanced bilateral facet arthropathy.  Prominent fluid is noted within the bilateral facet articulations with mild adjacent soft tissue edema.  There is moderate spinal canal stenosis.  There is moderate to severe left-sided and mild-to-moderate right-sided neural foraminal narrowing     L5-S1: There is a broad-based disc bulge and bilateral facet arthropathy.  There is mild bilateral neural foraminal narrowing.  No significant spinal canal stenosis.     Limited views of the posterior abdomen demonstrate bilateral renal T2 hyperintensities which are incompletely characterized but suggestive of cysts.     Impression:     1.  Grade 1 anterolisthesis of L4 on L5.  Multilevel degenerative changes of the lumbar spine, most pronounced at L4-L5 demonstrating moderate spinal canal stenosis as well as bilateral neural foraminal narrowing, left greater than right.  Please see above for level by level details.     2.  Mild  chronic anterior wedging of the T12 vertebral body which may be degenerative in etiology, noting intervertebral disc space height loss at T11-T12 with associated chronic endplate degenerative changes.        Electronically signed by:Paul Bledsoe MD  Date:                                            12/14/2019  Time:                                           01:24    MRI CERVICAL SPINE WITHOUT CONTRAST     CLINICAL HISTORY:  Neck pain, chronic, degenerative changes on xray;Myelopathy, acute, cervical spine; Other cervical disc degeneration, unspecified cervical region     TECHNIQUE:  Multiplanar, multisequence MR images of the cervical spine were performed without the administration of contrast.     COMPARISON:  X-ray 04/28/2022     FINDINGS:  Alignment: There is straightening of the cervical lordosis.  Subtle grade 1 anterolisthesis C2 on C3 and C5 on C4.     Vertebrae: Multilevel degenerative endplate sclerosis most severe at C4-5.     Discs: Degenerative findings:     C2-C3: Circumferential disc bulging and mild facet arthrosis.     C3-C4: Circumferential disc osteophyte complex and mild facet arthrosis.     C4-C5: Circumferential disc osteophyte complex and mild facet arthrosis contribute to moderate central canal stenosis and moderate left and mild right-sided foraminal stenosis.     C5-C6: Circumferential disc osteophyte complex mild facet arthrosis.  No central canal stenosis.  Mild bilateral foraminal stenosis.     C6-C7: Circumferential disc osteophyte complex and facet arthrosis.     C7-T1: Bilateral facet arthrosis.     T1-T2: None     Cord: Normal     Skull base and craniocervical junction: Normal.     Paraspinal muscles & soft tissues: Unremarkable.     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        Electronically signed by:Kwabena Mishra  Jr  Date:                                            07/11/2022  Time:                                           09:17    Allergies:   Review of patient's allergies indicates:   Allergen Reactions    No known drug allergies        Current Medications:   Current Medications[1]    REVIEW OF SYSTEMS:    GENERAL:  No new weight loss, malaise or fevers.  HEENT:  Negative for new frequent or significant headaches.  NECK:  Negative for new lumps, goiter, and significant neck swelling.  RESPIRATORY:  Negative for new cough, wheezing or shortness of breath.  CARDIOVASCULAR:  Negative for new chest pain, leg swelling or palpitations.  GI:  Negative for new abdominal discomfort, blood in stools or black stools or change in bowel habits.  MUSCULOSKELETAL:  See HPI.  SKIN:  Negative for new lesions, rash, and itching.  PSYCH:  Negative for new sleep disturbance, mood disorder  HEMATOLOGY/LYMPHOLOGY:  Negative for new prolonged bleeding, bruising easily or swollen nodes.  NEURO:   No new headaches, syncope, paralysis, seizures or tremors.  All other reviewed and negative other than HPI.    Past Medical History:  Past Medical History:   Diagnosis Date    Abnormal Pap smear of cervix     Anxiety     At high risk for breast cancer: TC 7 > 20% 2019 2/6/2019    Carpal tunnel syndrome     Elevated blood pressure     Family history of breast cancer in sister 10/1/2013    Hypertension     S/P hysterectomy 9/23/2017       Past Surgical History:  Past Surgical History:   Procedure Laterality Date    BREAST BIOPSY Right     2007    COLONOSCOPY N/A 8/19/2019    Procedure: COLONOSCOPY;  Surgeon: ROHIT Romero MD;  Location: [address];  Service: Endoscopy;  Laterality: N/A;    EPIDURAL STEROID INJECTION INTO CERVICAL SPINE N/A 8/3/2022    Procedure: Injection-steroid-epidural-cervical IL C7-T1;  Surgeon: Justina Hollingsworth MD;  [place] [facility] [place] [facility] [place]  Service: Pain Management;  Laterality: N/A;    HYSTERECTOMY      Total     "TRANSFORAMINAL EPIDURAL INJECTION OF STEROID Bilateral 12/23/2019    Procedure: LUMBAR TRANSFORAMINAL BILATERAL L4/5 TF JASON;  Surgeon: Hernesto Lazo MD;  Location: [facility] PAIN [facility];  Service: Pain Management;  Laterality: Bilateral;  NEEDS CONSENT    TRANSFORAMINAL EPIDURAL INJECTION OF STEROID Bilateral 1/16/2020    Procedure: LUMBAR TRANSFORAMINAL BILATERAL L4/5 TF JASON DIRECT REFERRAL;  Surgeon: Pamela Erazo MD;  Location: [facility] PAIN [facility];  Service: Pain Management;  Laterality: Bilateral;  NEEDS CONSENT    TUBAL LIGATION         Family History:  Family History   Problem Relation Name Age of Onset    Cancer Mother          unknown origin    Hypertension Mother      Hypertension Father blood clot     Thyroid disease Sister 7     Breast cancer Sister 7         in her mid-60s, unilateral    Depression Sister 7     Cancer Sister 7         Breast    Anxiety disorder Sister 7     No Known Problems Jaiden Diaz     No Known Problems Jaiden Carrasquillo     Ovarian cancer Neg Hx         Social History:  Social History     Socioeconomic History    Marital status:     Number of children: 2   Tobacco Use    Smoking status: Never    Smokeless tobacco: Never   Substance and Sexual Activity    Alcohol use: Yes     Comment: rare    Drug use: No    Sexual activity: Yes   Social History Narrative    Oldest son- Joe Carrasquillo is second son with 2 kids- [place]; Akshat 8 months; Aravind age 5 2023 (grandkids)     Social Drivers of Health     Stress: No Stress Concern Present (6/25/2019)    [employer] - Occupational Stress Questionnaire     Feeling of Stress : Only a little       OBJECTIVE:    /82   Pulse 76   Temp 98 °F (36.7 °C)   Resp 18   Ht 5' 2" (1.575 m)   Wt 77 kg (169 lb 12.1 oz)   SpO2 100%   BMI 31.05 kg/m²     PHYSICAL EXAMINATION:  General appearance: Well appearing, in no acute distress, alert and appropriately communicative.  Psych:  Mood and affect appropriate.  Skin: Skin color, " texture, turgor normal, no rashes or lesions, in both upper and lower body for exposed skin.  Head/face:  Atraumatic, normocephalic.  Cor: regular rate  Pulm: non-labored breathing  GI: Abdomen non-distended and non-tender.  Back: Straight leg raising in the sitting and supine positions is negative to radicular pain. No pain to palpation over the spine or paraspinal muscles. Normal range of motion without pain reproduction.  Extremities: No deformities, significant lymphedema, or skin discoloration. No significant open wounds. No major amputations.  Musculoskeletal: hip, sacroiliac and knee provocative maneuvers are negative. Bilateral upper and lower extremity strength is normal and symmetric.  No atrophy or tone abnormalities are noted.  Neuro: Bilateral upper and lower extremity coordination and muscle stretch reflexes abnormalities noted: none.  Clark and/or Clonus: negative; loss of sensation to light touch: none  Gait: normal    CMP  Sodium   Date Value Ref Range Status   08/16/2024 141 136 - 145 mmol/L Final     Potassium   Date Value Ref Range Status   08/16/2024 4.4 3.5 - 5.1 mmol/L Final     Chloride   Date Value Ref Range Status   08/16/2024 107 95 - 110 mmol/L Final     CO2   Date Value Ref Range Status   08/16/2024 24 23 - 29 mmol/L Final     Glucose   Date Value Ref Range Status   08/16/2024 81 70 - 110 mg/dL Final     BUN   Date Value Ref Range Status   08/16/2024 11 8 - 23 mg/dL Final     Creatinine   Date Value Ref Range Status   08/16/2024 0.9 0.5 - 1.4 mg/dL Final     Calcium   Date Value Ref Range Status   08/16/2024 10.0 8.7 - 10.5 mg/dL Final     Total Protein   Date Value Ref Range Status   08/16/2024 8.3 6.0 - 8.4 g/dL Final     Albumin   Date Value Ref Range Status   08/16/2024 4.4 3.5 - 5.2 g/dL Final     Total Bilirubin   Date Value Ref Range Status   08/16/2024 0.4 0.1 - 1.0 mg/dL Final     Comment:     For infants and newborns, interpretation of results should be based  on gestational  age, weight and in agreement with clinical  observations.    Premature Infant recommended reference ranges:  Up to 24 hours.............<8.0 mg/dL  Up to 48 hours............<12.0 mg/dL  3-5 days..................<15.0 mg/dL  6-29 days.................<15.0 mg/dL       Alkaline Phosphatase   Date Value Ref Range Status   08/16/2024 139 (H) 55 - 135 U/L Final     AST   Date Value Ref Range Status   08/16/2024 28 10 - 40 U/L Final     ALT   Date Value Ref Range Status   08/16/2024 31 10 - 44 U/L Final     Anion Gap   Date Value Ref Range Status   08/16/2024 10 8 - 16 mmol/L Final     eGFR   Date Value Ref Range Status   08/16/2024 >60.0 >60 mL/min/1.73 m^2 Final         ASSESSMENT: 64 y.o. year old female with chronic pain, consistent with:     1. Lumbar radiculopathy  Ambulatory Referral/Consult to Physical Therapy/Occupational Therapy    DULoxetine (CYMBALTA) 30 MG capsule    LIDOcaine (LIDODERM) 5 %      2. DDD (degenerative disc disease), cervical  Ambulatory Referral/Consult to Physical Therapy/Occupational Therapy    LIDOcaine (LIDODERM) 5 %      3. Cervical radiculopathy  Ambulatory Referral/Consult to Physical Therapy/Occupational Therapy    DULoxetine (CYMBALTA) 30 MG capsule        IMPRESSION: Buffy Morin presents today for lower back and left > right leg pain. History and physical exam are consistent with lumbar radiculopathy.  Imaging is consistent with lumbar degenerative disc disease with listhesis at L4/5 and associated foraminal stenosis (remote).  We will start with basic imaging and conservative management (physical therapy and non-narcotic medications). If their pain persists despite conservative measures, we will consider advanced imaging and/or interventions in an effort to give them additional relief for their pain.    PLAN:   - I have stressed the importance of physical activity and a home exercise plan to help with pain and improve health.  - Patient can continue with medications for now  since they are providing benefits, using them appropriately, and without side effects.  - start duloxetine 30mg daily  - script given for lidoderm 5% patches  - referral to physical therapy for lower back pain and lumbar radiculopathy  - RTC in 2 - 3 months to discuss advanced imaging and/or interventions, if indicated at that time  - Counseled patient regarding the importance of activity modification and physical therapy.    The above plan and management options were discussed at length with patient. Patient is in agreement with the above and verbalized understanding.    Oli Arndt  03/12/2025           [1]   Current Outpatient Medications   Medication Sig Dispense Refill    amLODIPine (NORVASC) 10 MG tablet Take 0.5 tablets (5 mg total) by mouth once daily. 45 tablet 3    atorvastatin (LIPITOR) 80 MG tablet TAKE 1 TABLET (80 MG TOTAL) BY MOUTH ONCE DAILY. 90 tablet 3    cholecalciferol, vitamin D3, (VITAMIN D3) 50 mcg (2,000 unit) Cap Take 1 capsule by mouth Daily. Take vitamin D 2000 units every day.      diclofenac sodium (VOLTAREN) 1 % Gel Apply two grams to affected area 3-4 times as needed 1 each 3    meloxicam (MOBIC) 15 MG tablet TAKE 1 TABLET BY MOUTH EVERY DAY 30 tablet 2    DULoxetine (CYMBALTA) 30 MG capsule Take 1 capsule (30 mg total) by mouth once daily. 30 capsule 2    LIDOcaine (LIDODERM) 5 % Place 1 patch onto the skin once daily. Remove & Discard patch within 12 hours or as directed by MD 30 patch 2    loratadine (CLARITIN) 10 mg tablet Take 1 tablet (10 mg total) by mouth daily as needed for Allergies. 30 tablet 0    LORazepam (ATIVAN) 0.5 MG tablet Take 1 tablet (0.5 mg total) by mouth daily as needed for Anxiety. 30 tablet 3    meclizine (ANTIVERT) 12.5 mg tablet Take 1 tablet (12.5 mg total) by mouth 3 (three) times daily as needed for Dizziness or Nausea. 60 tablet 0     Current Facility-Administered Medications   Medication Dose Route Frequency Provider Last Rate Last Admin    perflutren  protein-A microsphr 0.22 mg/mL IV susp  0.5 mL Intravenous Once Esdras Agudelo MD

## 2025-03-12 NOTE — PLAN OF CARE-OVED
[facility] Emergency Department Plan of Care Note  Referral Source: Nurse On-Call                               Chief Complaint   Patient presents with    Back Pain       Recommendation: Specialist Referral         Specialty: Pain Management                  Encounter Diagnosis   Name Primary?    Acute left-sided low back pain with left-sided sciatica Yes        Orders Placed This Encounter    Ambulatory referral/consult to Pain Clinic     Referral Priority:   Urgent     Referral Type:   Consultation     Referral Reason:   Specialty Services Required     Requested Specialty:   Pain Medicine     Number of Visits Requested:   1     64-year-old female with history of HTN contacted nurse triage for recommendations about her low back pain radiating down her left leg for the past few weeks.  She has been seen by Orthopedics for this in the past, no known trauma or other associated symptoms such as leg numbness/weakness or dysuria.  Per chart review patient has history of cervical radiculopathy and has been seen by Orthopedics and pain management for this including epidural injection in 2022.  No indication for ER evaluation at this point, no clinical sign of spinal fracture or spinal cord compression.  Patient was agreeable to pain management appointment at [facility] this afternoon for evaluation and further management.

## 2025-03-12 NOTE — TELEPHONE ENCOUNTER
Patient has been having lower back pain that radiates down the left leg for a few weeks. She has been seen several years ago for same complaint by ortho & pain.  Pain is 8/10 and gradual onset. No dysuria. She has tried topical pain patches. DIspo - be seen in office or VV today. Patient declines VV and would like to be seen in person. Unable to schedule with specialty. Sent secure chat to [facility] and chart reviewed by Dr. Ash. Rosa was able to schedule with [facility] pain this afternoon. Instructed to call back with additional questions or worsening of symptoms. Patient verbalized understanding.     Reason for Disposition   SEVERE back pain (e.g., excruciating, unable to do any normal activities) and not improved after pain medicine and CARE ADVICE    Additional Information   Negative: Passed out (e.g., fainted, lost consciousness, blacked out and was not responding)   Negative: Shock suspected (e.g., cold/pale/clammy skin, too weak to stand, low BP, rapid pulse)   Negative: Sounds like a life-threatening emergency to the triager   Negative: SEVERE back pain of sudden onset and age > 60 years   Negative: SEVERE abdominal pain (e.g., excruciating)   Negative: Abdominal pain and age > 60 years   Negative: Unable to urinate (or only a few drops) and bladder feels very full   Negative: Loss of bladder or bowel control (urine or bowel incontinence; wetting self, leaking stool) of new-onset   Negative: Numbness (loss of sensation) in groin or rectal area   Negative: Pain radiates into groin, scrotum   Negative: Blood in urine (red, pink, or tea-colored)   Negative: Vomiting and pain over lower ribs of back (i.e., flank - kidney area)   Negative: Weakness of a leg or foot (e.g., unable to bear weight, dragging foot)   Negative: Patient sounds very sick or weak to the triager   Negative: Fever > 100.4 F (38.0 C) and flank pain   Negative: Pain or burning with passing urine (urination)    Protocols used: Back  Pain-A-[place]

## 2025-03-12 NOTE — PROGRESS NOTES
"Patient spoke with [facility]OC RN on 3/12/2025 with complaint of "Patient has been having lower back pain that radiates down the left leg for a few weeks. She has been seen several years ago for same complaint by ortho & pain.  Pain is 8/10 and gradual onset. No dysuria. She has tried topical pain patches."    OOC RN consulted with [facility] provider, Dr. Bedoya and disposition recommended was specialty/pain management appointment to be scheduled.  Patient scheduled same day pain management appointment at 3:00 pm with Oli Arndt MD.    Will follow up with patient on 3/13/2025 to assess if she received the care she was needing and if had any additional questions or concerns.      "

## 2025-03-13 ENCOUNTER — PATIENT OUTREACH (OUTPATIENT)
Facility: OTHER | Age: 65
End: 2025-03-13
Payer: COMMERCIAL

## 2025-03-13 NOTE — PROGRESS NOTES
Patient spoke with [facility]OC RN on 3/12/2025 for low back pain.  [facility] provider, Dr. Bedoya consulted and recommended patient follow up with pain management.  Patient scheduled for same day pain management appointment.    Patient checked in to pain management appointment on 3/12/2025.  Patient outreached to assess if she had any concerns or needs following pain management appointment but unable to reach.  Left patient a voicemail for call return in the event had any concerns.  Will assist with any concerns in the event patient returns call.

## 2025-03-17 ENCOUNTER — TELEPHONE (OUTPATIENT)
Dept: PAIN MEDICINE | Facility: CLINIC | Age: 65
End: 2025-03-17
Payer: COMMERCIAL

## 2025-03-17 DIAGNOSIS — M54.16 LUMBAR RADICULOPATHY: Primary | ICD-10-CM

## 2025-03-17 RX ORDER — GABAPENTIN 100 MG/1
100 CAPSULE ORAL 3 TIMES DAILY
Qty: 90 CAPSULE | Refills: 2 | Status: SHIPPED | OUTPATIENT
Start: 2025-03-17

## 2025-03-17 NOTE — TELEPHONE ENCOUNTER
----- Message from Oli Arndt sent at 3/17/2025 11:40 AM CDT -----  She can stop the other medication for now.I sent another mediation to her pharmacy.Oli Arndt  ----- Message -----  From: Mel Theodore MA  Sent: 3/17/2025  11:28 AM CDT  To: Oli Arndt MD    Good morning please advise.  ----- Message -----  From: Karin Almonte  Sent: 3/17/2025  11:14 AM CDT  To: [facility] Staff    Type:  Needs Medical AdviceWho Called:  Pt Symptoms (please be specific): side effects from pain rx prescribed (pt does not know name) jittery, anxious How long has patient had these symptoms:  03/12Pharmacy name and phone #:  [facility]/pharmacy #[id number] - [place] - [address] AT [place] [address] [address] [address]Pwgvv: [phone] the patient rather a call back or a response via [facility]? Call Best Call Back Number: [phone] Additional Information:  requesting alternative rx

## 2025-03-25 ENCOUNTER — CLINICAL SUPPORT (OUTPATIENT)
Dept: REHABILITATION | Facility: HOSPITAL | Age: 65
End: 2025-03-25
Payer: COMMERCIAL

## 2025-03-25 DIAGNOSIS — M54.12 CERVICAL RADICULOPATHY: ICD-10-CM

## 2025-03-25 DIAGNOSIS — M79.604 LUMBAR PAIN WITH RADIATION DOWN BOTH LEGS: Primary | ICD-10-CM

## 2025-03-25 DIAGNOSIS — M54.50 LUMBAR PAIN WITH RADIATION DOWN BOTH LEGS: Primary | ICD-10-CM

## 2025-03-25 DIAGNOSIS — M54.16 LUMBAR RADICULOPATHY: ICD-10-CM

## 2025-03-25 DIAGNOSIS — M79.605 LUMBAR PAIN WITH RADIATION DOWN BOTH LEGS: Primary | ICD-10-CM

## 2025-03-25 DIAGNOSIS — M50.30 DDD (DEGENERATIVE DISC DISEASE), CERVICAL: ICD-10-CM

## 2025-03-25 PROCEDURE — 97161 PT EVAL LOW COMPLEX 20 MIN: CPT

## 2025-03-25 PROCEDURE — 97112 NEUROMUSCULAR REEDUCATION: CPT

## 2025-03-26 NOTE — PROGRESS NOTES
Outpatient Rehab    Physical Therapy Evaluation    Patient Name: Buffy Morin  MRN: [id number]  YOB: 1960  Encounter Date: 3/25/2025    Therapy Diagnosis:   Encounter Diagnoses   Name Primary?    Lumbar radiculopathy     DDD (degenerative disc disease), cervical     Cervical radiculopathy     Lumbar pain with radiation down both legs Yes     Physician: Oli Arndt MD    Physician Orders: Eval and Treat  Medical Diagnosis: Lumbar radiculopathy  DDD (degenerative disc disease), cervical  Cervical radiculopathy    Visit # / Visits Authorized:  1 / 1  Insurance Authorization Period: 3/12/2025 to 3/12/2026  Date of Evaluation: 3/25/2025  Plan of Care Certification: 3/25/2025 to 5/30/3035     Time In: 1415   Time Out: 1500  Total Time: 45   Total Billable Time:  45 minutes    Intake Outcome Measure for FOTO Survey    Therapist reviewed FOTO scores for Buffy Morin on 3/25/2025.   FOTO report - see Media section or FOTO account episode details.     Intake Score:  See media section.          Subjective   History of Present Illness  Buffy is a 64 y.o. female who reports to physical therapy with a chief concern of low back pain with radiating pain.     The patient reports a medical diagnosis of Lumbar radiculopathy (M54.16), DDD (degenerative disc disease), cervical (M50.30), Cervical radiculopathy (M54.12).            History of Present Condition/Illness: Buffy reports that she has had low back pain radiating into her right and left legs for a few years. She reports she has had bilateral shoulder pain for about 2.5 years now. She reports that she enjoys going on walks outside and exercising. She said she has been taking medication, but her back pain will flare up every now and then. She has been taking Gabapentin, which has brought a lot of relief.     Activities of Daily Living  Social history was obtained from Patient.               Previously independent with activities of daily living? Yes      Currently independent with activities of daily living? Yes              Pain     Patient reports a current pain level of 6/10. Pain at best is reported as 4/10. Pain at worst is reported as 9/10.   Location: lumbar pain into right or left leg  Clinical Progression (since onset): Stable  Pain Qualities: Radiating, Burning, Pulling  Pain-Relieving Factors: Medications - prescription         Review of Systems  Patient denies: Bladder Incontinence, Bowel Incontinence, Saddle Numbness, Cancer History, Cardiac History, and Diabetes  Additional Red Flag Details: Denies seizures and asthma     Living Arrangements  Living Situation  Living Arrangements: Alone  Support Systems: Family members        Employment  Patient reports: Does the patient's condition impact their ability to work?  Employment Status: Employed full-time   Medical records; retiring in a few months      Past Medical History/Physical Systems Review:   Buffy Morin  has a past medical history of Abnormal Pap smear of cervix, Anxiety, At high risk for breast cancer: TC 7 > 20% 2019, Carpal tunnel syndrome, Elevated blood pressure, Family history of breast cancer in sister, Hypertension, and S/P hysterectomy.    Buffy Morin  has a past surgical history that includes Tubal ligation; Breast biopsy (Right); Hysterectomy; Colonoscopy (N/A, 8/19/2019); Transforaminal epidural injection of steroid (Bilateral, 12/23/2019); Transforaminal epidural injection of steroid (Bilateral, 1/16/2020); and Epidural steroid injection into cervical spine (N/A, 8/3/2022).    Buffy has a current medication list which includes the following prescription(s): amlodipine, atorvastatin, cholecalciferol (vitamin d3), diclofenac sodium, duloxetine, gabapentin, lidocaine, loratadine, lorazepam, meclizine, and meloxicam, and the following Facility-Administered Medications: perflutren protein-a microsphr.    Review of patient's allergies indicates:   Allergen Reactions    No known  drug allergies         Objective      Lumbar Range of Motion   Active (deg) Passive (deg) Pain   Flexion 70       Extension 15   Yes   Right Lateral Flexion 20       Right Rotation 20       Left Lateral Flexion 20       Left Rotation 20                        Four Stage Balance Test                 Single Leg Stand - Right Foot: 10 sec  Single Leg Stand - Left Foot: 10 sec            Treatment:  Balance/Neuromuscular Re-Education  NMR 1: HEP: glute sets, posterior pelvic tilts, glute bridges, 10 reps each, 5 second holds  NMR 2: Education on prognosis and plan of care, home exercise program, involved anatomy, and connected back program    Time Entry(in minutes):  PT Evaluation (Low) Time Entry: 30  Neuromuscular Re-Education Time Entry: 15    Assessment & Plan   Assessment  Buffy presents with a condition of Low complexity.   Presentation of Symptoms: Stable  Will Comorbidities Impact Care: No       Functional Limitations: Activity tolerance, Ambulating on uneven surfaces, Completing self-care activities, Completing work/school activities, Decreased ambulation distance/endurance, Functional mobility, Gait limitations, Range of motion, Proprioception, Painful locomotion/ambulation, Pain with ADLs/IADLs, Participating in leisure activities, Standing tolerance, Sitting tolerance, Transfers  Impairments: Activity intolerance, Pain with functional activity, Impaired physical strength    Patient Goal for Therapy (PT): to decrease lumbar pain and return to prior level of function  Prognosis: Good  Assessment Details: Buffy is a 64-year-old female with a medical diagnosis of Lumbar radiculopathy M54.16, DDD (degenerative disc disease), cervical M50.30, Cervical radiculopathy M54.12. Patient presents with the following: decreased functional mobility, decreased functional strength, decreased activity tolerance, decreased lumbar range of motion. She will benefit from skilled physical therapy to address the above deficits and  return to prior level of function without pain.      Plan  From a physical therapy perspective, the patient would benefit from: Skilled Rehab Services    Planned therapy interventions include: Therapeutic exercise, Therapeutic activities, Neuromuscular re-education, Manual therapy, ADLs/IADLs, Aquatic therapy, and Gait training.    Planned modalities to include: Cryotherapy (cold pack), Thermotherapy (hot pack), and Electrical stimulation - passive/unattended.        Visit Frequency: 2 times Per Week for 8 Weeks.       This plan was discussed with Patient.   Discussion participants: Agreed Upon Plan of Care  Plan details: Discharge to Connected Back program after PT          Patient's spiritual, cultural, and educational needs considered and patient agreeable to plan of care and goals.           Goals:   Active       Ambulation/movement       Patient will walk for 20 minutes with </= 3/10 pain to demonstrate improved functional strength       Start:  03/26/25    Expected End:  05/30/25               Functional outcome       Patient will show a significant change in FOTO patient-reported outcome tool to demonstrate subjective improvement       Start:  03/26/25    Expected End:  05/30/25            Patient stated goal: to decrease lumbar pain and return to prior level of function        Start:  03/26/25    Expected End:  05/30/25            Patient will demonstrate independence in home program for support of progression       Start:  03/26/25    Expected End:  04/30/25               Pain       Patient will report a 2 point reduction in pain while performing physical therapy exercises to demonstrate functional mobility       Start:  03/26/25    Expected End:  04/30/25                Felicitas Aguayo, PT, DPT

## 2025-04-03 ENCOUNTER — CLINICAL SUPPORT (OUTPATIENT)
Dept: REHABILITATION | Facility: HOSPITAL | Age: 65
End: 2025-04-03
Payer: COMMERCIAL

## 2025-04-03 DIAGNOSIS — M79.604 LUMBAR PAIN WITH RADIATION DOWN BOTH LEGS: Primary | ICD-10-CM

## 2025-04-03 DIAGNOSIS — M79.605 LUMBAR PAIN WITH RADIATION DOWN BOTH LEGS: Primary | ICD-10-CM

## 2025-04-03 DIAGNOSIS — M54.50 LUMBAR PAIN WITH RADIATION DOWN BOTH LEGS: Primary | ICD-10-CM

## 2025-04-03 PROCEDURE — 97112 NEUROMUSCULAR REEDUCATION: CPT

## 2025-04-07 NOTE — PROGRESS NOTES
Outpatient Rehab    Physical Therapy Visit    Patient Name: Buffy Morin  MRN: [id number]  YOB: 1960  Encounter Date: 4/3/2025    Therapy Diagnosis:   Encounter Diagnosis   Name Primary?    Lumbar pain with radiation down both legs Yes     Physician: Oli Arndt MD    Physician Orders: Eval and Treat  Medical Diagnosis: Radiculopathy, lumbar region  Other cervical disc degeneration, unspecified cervical region  Radiculopathy, cervical region    Visit # / Visits Authorized:  1 / 20  Insurance Authorization Period: 3/19/2025 to 12/31/2025  Date of Evaluation: 3/25/2025  Plan of Care Certification: 3/25/2025 to 5/30/2025     PT/PTA: PT   Number of PTA visits since last PT visit:0  Time In: 1405   Time Out: 1455  Total Time: 50   Total Billable Time:  50 minutes    FOTO: See media section.      Subjective   Patient reports that her low back pain has still been bothering her..         Objective            Treatment:  Balance/Neuromuscular Re-Education  NMR 1: Glute/core series: glute sets, posterior pelvic tilts, glute bridges, 3 x 10 reps each, 5 second holds  NMR 2: Education on prognosis and plan of care, home exercise program, involved anatomy, and connected back program  NMR 3: NuStep for endogenous release of opioids: 10 minutes    Time Entry(in minutes):  Neuromuscular Re-Education Time Entry: 50    Assessment & Plan   Assessment: Buffy tolerated today's session well with no exacerbations of low back pain. She asked to end session due to stomach pains. Continued to progress hip and core activation exercises. Will continue to progress as tolerated. Patient is open to Connected Back program at discharge.  Evaluation/Treatment Tolerance: Patient tolerated treatment well    Patient will continue to benefit from skilled outpatient physical therapy to address the deficits listed in the problem list box on initial evaluation, provide pt/family education and to maximize pt's level of independence in  the home and community environment.     Patient's spiritual, cultural, and educational needs considered and patient agreeable to plan of care and goals.           Plan: Consider clamshells and shuttle next visit.    Goals:   Active       Ambulation/movement       Patient will walk for 20 minutes with </= 3/10 pain to demonstrate improved functional strength (Progressing)       Start:  03/26/25    Expected End:  05/30/25               Functional outcome       Patient will show a significant change in FOTO patient-reported outcome tool to demonstrate subjective improvement (Progressing)       Start:  03/26/25    Expected End:  05/30/25            Patient stated goal: to decrease lumbar pain and return to prior level of function  (Progressing)       Start:  03/26/25    Expected End:  05/30/25            Patient will demonstrate independence in home program for support of progression (Progressing)       Start:  03/26/25    Expected End:  04/30/25               Pain       Patient will report a 2 point reduction in pain while performing physical therapy exercises to demonstrate functional mobility (Progressing)       Start:  03/26/25    Expected End:  04/30/25                Felicitas Aguayo, PT, DPT

## 2025-04-10 ENCOUNTER — CLINICAL SUPPORT (OUTPATIENT)
Dept: REHABILITATION | Facility: HOSPITAL | Age: 65
End: 2025-04-10
Payer: COMMERCIAL

## 2025-04-10 DIAGNOSIS — M79.605 LUMBAR PAIN WITH RADIATION DOWN BOTH LEGS: Primary | ICD-10-CM

## 2025-04-10 DIAGNOSIS — M79.604 LUMBAR PAIN WITH RADIATION DOWN BOTH LEGS: Primary | ICD-10-CM

## 2025-04-10 DIAGNOSIS — M54.50 LUMBAR PAIN WITH RADIATION DOWN BOTH LEGS: Primary | ICD-10-CM

## 2025-04-10 PROCEDURE — 97112 NEUROMUSCULAR REEDUCATION: CPT

## 2025-04-10 PROCEDURE — 97530 THERAPEUTIC ACTIVITIES: CPT

## 2025-04-10 NOTE — PROGRESS NOTES
Outpatient Rehab    Physical Therapy Visit    Patient Name: Buffy Morin  MRN: [id number]  YOB: 1960  Encounter Date: 4/10/2025    Therapy Diagnosis:   Encounter Diagnosis   Name Primary?    Lumbar pain with radiation down both legs Yes     Physician: Oli Arndt MD    Physician Orders: Eval and Treat  Medical Diagnosis: Radiculopathy, lumbar region  Other cervical disc degeneration, unspecified cervical region  Radiculopathy, cervical region    Visit # / Visits Authorized:  2 / 20  Insurance Authorization Period: 3/19/2025 to 12/31/2025  Date of Evaluation: 3/25/2025  Plan of Care Certification: 3/25/2025 to 5/30/2025     PT/PTA: PT   Number of PTA visits since last PT visit:0  Time In: 1350   Time Out: 1448  Total Time: 58   Total Billable Time:  48 minutes    FOTO:  See media section.        Subjective   Patient reports that she has been doing her exercises and feels pretty good.         Objective            Treatment:  Balance/Neuromuscular Re-Education  NMR 1: Glute/core series: posterior pelvic tilts, clamshells, BKFO, 2-3 x 10 reps each, 5 second holds  NMR 2: Open books: 2 x 10 reps each side  NMR 3: NuStep for endogenous release of opioids: 10 minutes  Therapeutic Activity  TA 1: Suitcase carries (8#): 5 laps each hand  TA 2: Shuttle DL (50 lbs): 3 x 10 reps    Time Entry(in minutes):  Neuromuscular Re-Education Time Entry: 25  Therapeutic Activity Time Entry: 23    Assessment & Plan   Assessment: Buffy tolerated today's session well with no exacerbations of low back pain. She demonstrates improved independence with home exercise program and decreased symptoms. Will continue to progress as tolerated. Patient is open to Connected Back program at discharge.  Evaluation/Treatment Tolerance: Patient tolerated treatment well    Patient will continue to benefit from skilled outpatient physical therapy to address the deficits listed in the problem list box on initial evaluation, provide  pt/family education and to maximize pt's level of independence in the home and community environment.     Patient's spiritual, cultural, and educational needs considered and patient agreeable to plan of care and goals.           Plan: Consider lateral walks next visit    Goals:   Active       Ambulation/movement       Patient will walk for 20 minutes with </= 3/10 pain to demonstrate improved functional strength (Progressing)       Start:  03/26/25    Expected End:  05/30/25               Functional outcome       Patient will show a significant change in FOTO patient-reported outcome tool to demonstrate subjective improvement (Progressing)       Start:  03/26/25    Expected End:  05/30/25            Patient stated goal: to decrease lumbar pain and return to prior level of function  (Progressing)       Start:  03/26/25    Expected End:  05/30/25            Patient will demonstrate independence in home program for support of progression (Met)       Start:  03/26/25    Expected End:  04/30/25    Resolved:  04/10/25            Pain       Patient will report a 2 point reduction in pain while performing physical therapy exercises to demonstrate functional mobility (Progressing)       Start:  03/26/25    Expected End:  04/30/25                Felicitas Aguayo, PT, DPT

## 2025-04-15 ENCOUNTER — CLINICAL SUPPORT (OUTPATIENT)
Dept: REHABILITATION | Facility: HOSPITAL | Age: 65
End: 2025-04-15
Payer: COMMERCIAL

## 2025-04-15 DIAGNOSIS — M54.50 LUMBAR PAIN WITH RADIATION DOWN BOTH LEGS: Primary | ICD-10-CM

## 2025-04-15 DIAGNOSIS — M79.605 LUMBAR PAIN WITH RADIATION DOWN BOTH LEGS: Primary | ICD-10-CM

## 2025-04-15 DIAGNOSIS — M79.604 LUMBAR PAIN WITH RADIATION DOWN BOTH LEGS: Primary | ICD-10-CM

## 2025-04-15 PROCEDURE — 97112 NEUROMUSCULAR REEDUCATION: CPT

## 2025-04-16 NOTE — PROGRESS NOTES
Outpatient Rehab    Physical Therapy Visit    Patient Name: Buffy Morin  MRN: [id number]  YOB: 1960  Encounter Date: 4/15/2025    Therapy Diagnosis:   Encounter Diagnosis   Name Primary?    Lumbar pain with radiation down both legs Yes     Physician: Oli Arndt MD    Physician Orders: Eval and Treat  Medical Diagnosis: Radiculopathy, lumbar region  Other cervical disc degeneration, unspecified cervical region  Radiculopathy, cervical region    Visit # / Visits Authorized:  3 / 20  Insurance Authorization Period: 3/19/2025 to 12/31/2025  Date of Evaluation: 3/25/2025  Plan of Care Certification: 3/25/2025 to 5/30/2025     PT/PTA: PT   Number of PTA visits since last PT visit:0  Time In: 1400   Time Out: 1448  Total Time: 48   Total Billable Time:  48 minutes    FOTO: See media section.        Subjective   Patient reports no new low back pain..         Objective            Treatment:  Balance/Neuromuscular Re-Education  NMR 1: Glute/core series: posterior pelvic tilts, clamshells, BKFO, 2-3 x 10 reps each, 5 second holds  NMR 2: Open books: 2 x 10 reps each side  NMR 3: NuStep for endogenous release of opioids: 10 minutes  NMR 4: Lateral walks - terminated after three laps    Time Entry(in minutes):  Neuromuscular Re-Education Time Entry: 48    Assessment & Plan   Assessment: Buffy presented to physical therapy today with no new reports of low back pain. She tolerated today's session well and reported fatigue at the end of the session. Will continue to progress as tolerated  Evaluation/Treatment Tolerance: Patient tolerated treatment well    Patient will continue to benefit from skilled outpatient physical therapy to address the deficits listed in the problem list box on initial evaluation, provide pt/family education and to maximize pt's level of independence in the home and community environment.     Patient's spiritual, cultural, and educational needs considered and patient agreeable to plan  of care and goals.           Plan: Consider lateral walks next visit    Goals:   Active       Ambulation/movement       Patient will walk for 20 minutes with </= 3/10 pain to demonstrate improved functional strength (Progressing)       Start:  03/26/25    Expected End:  05/30/25               Functional outcome       Patient will show a significant change in FOTO patient-reported outcome tool to demonstrate subjective improvement (Progressing)       Start:  03/26/25    Expected End:  05/30/25            Patient stated goal: to decrease lumbar pain and return to prior level of function  (Progressing)       Start:  03/26/25    Expected End:  05/30/25            Patient will demonstrate independence in home program for support of progression (Met)       Start:  03/26/25    Expected End:  04/30/25    Resolved:  04/10/25            Pain       Patient will report a 2 point reduction in pain while performing physical therapy exercises to demonstrate functional mobility (Progressing)       Start:  03/26/25    Expected End:  04/30/25                Felicitas Aguayo, PT, DPT

## 2025-04-17 ENCOUNTER — CLINICAL SUPPORT (OUTPATIENT)
Dept: REHABILITATION | Facility: HOSPITAL | Age: 65
End: 2025-04-17
Payer: COMMERCIAL

## 2025-04-17 DIAGNOSIS — M54.50 LUMBAR PAIN WITH RADIATION DOWN BOTH LEGS: Primary | ICD-10-CM

## 2025-04-17 DIAGNOSIS — M79.605 LUMBAR PAIN WITH RADIATION DOWN BOTH LEGS: Primary | ICD-10-CM

## 2025-04-17 DIAGNOSIS — M79.604 LUMBAR PAIN WITH RADIATION DOWN BOTH LEGS: Primary | ICD-10-CM

## 2025-04-17 PROCEDURE — 97112 NEUROMUSCULAR REEDUCATION: CPT

## 2025-04-21 NOTE — PROGRESS NOTES
Outpatient Rehab    Physical Therapy Visit    Patient Name: Buffy Morin  MRN: [id number]  YOB: 1960  Encounter Date: 4/17/2025    Therapy Diagnosis:   Encounter Diagnosis   Name Primary?    Lumbar pain with radiation down both legs Yes     Physician: Oli Arndt MD    Physician Orders: Eval and Treat  Medical Diagnosis: Radiculopathy, lumbar region  Other cervical disc degeneration, unspecified cervical region  Radiculopathy, cervical region    Visit # / Visits Authorized:  4 / 20  Insurance Authorization Period: 3/19/2025 to 12/31/2025  Date of Evaluation: 3/25/2025  Plan of Care Certification: 3/25/2025 to 5/30/2025     PT/PTA: PT   Number of PTA visits since last PT visit:0  Time In: 1402   Time Out: 1455  Total Time: 53   Total Billable Time:  53 minutes    FOTO: See media section.        Subjective   Patient reports no new low back pain.         Objective            Treatment:  Balance/Neuromuscular Re-Education  NMR 1: Glute/core series: posterior pelvic tilts, clamshells, BKFO (RedTB), bridges 2-3 x 10 reps each, 5 second holds  NMR 2: Open books: 2 x 10 reps each side  NMR 3: NuStep for endogenous release of opioids: 10 minutes  NMR 4: FOTO administered    Time Entry(in minutes):  Neuromuscular Re-Education Time Entry: 53    Assessment & Plan   Assessment: Buffy presented to physical therapy today with no new reports of low back pain. Introduced glute brigdes and progress home exercise program with no exacerbations of pain. She tolerated today's session well and reported fatigue at the end of the session. Will continue to progress as tolerated  Evaluation/Treatment Tolerance: Patient tolerated treatment well    Patient will continue to benefit from skilled outpatient physical therapy to address the deficits listed in the problem list box on initial evaluation, provide pt/family education and to maximize pt's level of independence in the home and community environment.     Patient's  spiritual, cultural, and educational needs considered and patient agreeable to plan of care and goals.           Plan: Consider standing hip ext/abd next visit    Goals:   Active       Ambulation/movement       Patient will walk for 20 minutes with </= 3/10 pain to demonstrate improved functional strength (Progressing)       Start:  03/26/25    Expected End:  05/30/25               Functional outcome       Patient will show a significant change in FOTO patient-reported outcome tool to demonstrate subjective improvement (Progressing)       Start:  03/26/25    Expected End:  05/30/25            Patient stated goal: to decrease lumbar pain and return to prior level of function  (Progressing)       Start:  03/26/25    Expected End:  05/30/25            Patient will demonstrate independence in home program for support of progression (Met)       Start:  03/26/25    Expected End:  04/30/25    Resolved:  04/10/25            Pain       Patient will report a 2 point reduction in pain while performing physical therapy exercises to demonstrate functional mobility (Progressing)       Start:  03/26/25    Expected End:  04/30/25                Felicitas Aguayo, PT, DPT

## 2025-04-22 ENCOUNTER — CLINICAL SUPPORT (OUTPATIENT)
Dept: REHABILITATION | Facility: HOSPITAL | Age: 65
End: 2025-04-22
Payer: COMMERCIAL

## 2025-04-22 DIAGNOSIS — M54.50 LUMBAR PAIN WITH RADIATION DOWN BOTH LEGS: Primary | ICD-10-CM

## 2025-04-22 DIAGNOSIS — M79.605 LUMBAR PAIN WITH RADIATION DOWN BOTH LEGS: Primary | ICD-10-CM

## 2025-04-22 DIAGNOSIS — M79.604 LUMBAR PAIN WITH RADIATION DOWN BOTH LEGS: Primary | ICD-10-CM

## 2025-04-22 PROCEDURE — 97112 NEUROMUSCULAR REEDUCATION: CPT | Mod: CQ

## 2025-04-22 PROCEDURE — 97530 THERAPEUTIC ACTIVITIES: CPT | Mod: CQ

## 2025-04-23 NOTE — PROGRESS NOTES
Outpatient Rehab    Physical Therapy Visit    Patient Name: Buffy Morin  MRN: [id number]  YOB: 1960  Encounter Date: 4/22/2025    Therapy Diagnosis:   Encounter Diagnosis   Name Primary?    Lumbar pain with radiation down both legs Yes     Physician: Oli Arndt MD    Physician Orders: Eval and Treat  Medical Diagnosis: Radiculopathy, lumbar region  Other cervical disc degeneration, unspecified cervical region  Radiculopathy, cervical region    Visit # / Visits Authorized:  5 / 20  Insurance Authorization Period: 3/19/2025 to 12/31/2025  Date of Evaluation: 3/25/2025  Plan of Care Certification: 3/25/2025 to 5/30/2025      PT/PTA: PTA   Number of PTA visits since last PT visit:1  Time In: 1405   Time Out: 1500  Total Time: 55   Total Billable Time: 55    FOTO:  Intake Score:  %  Survey Score 1:  %  Survey Score 2:  %         Subjective   Lateral LLE pain and B shoulder pain.  Pain reported as 3/10.      Objective            Treatment:  Balance/Neuromuscular Re-Education  NMR 1: Glute/core series: posterior pelvic tilts, clamshells, BKFO (RedTB), bridges 2-3 x 10 reps each, 5 second holds  NMR 3: Bike for endogenous release of opioids: 10 minutes  NMR 4: rows with red tubing: 3x10; shoulder extensions with red tubing: 3x10; supine serratus punches, 2 lbs: 2x10; cw/ccw stability circles with ball on wall: 1x15/UE each direction  Therapeutic Activity  TA 2: Shuttle DL (50 lbs): 3 x 10 reps    Time Entry(in minutes):  Neuromuscular Re-Education Time Entry: 45  Therapeutic Activity Time Entry: 10    Assessment & Plan   Assessment: Buffy presented to physical therapy today with continued reports of low back pain. Introduced periscapular stabilization exercises with no exacerbations of pain. She tolerated today's session well and reported fatigue at the end of the session. Will continue to progress as tolerated.       Patient will continue to benefit from skilled outpatient physical therapy to address  the deficits listed in the problem list box on initial evaluation, provide pt/family education and to maximize pt's level of independence in the home and community environment.     Patient's spiritual, cultural, and educational needs considered and patient agreeable to plan of care and goals.           Plan: Consider standing hip ext/abd next visit    Goals:   Active       Ambulation/movement       Patient will walk for 20 minutes with </= 3/10 pain to demonstrate improved functional strength (Progressing)       Start:  03/26/25    Expected End:  05/30/25               Functional outcome       Patient will show a significant change in FOTO patient-reported outcome tool to demonstrate subjective improvement (Progressing)       Start:  03/26/25    Expected End:  05/30/25            Patient stated goal: to decrease lumbar pain and return to prior level of function  (Progressing)       Start:  03/26/25    Expected End:  05/30/25            Patient will demonstrate independence in home program for support of progression (Met)       Start:  03/26/25    Expected End:  04/30/25    Resolved:  04/10/25            Pain       Patient will report a 2 point reduction in pain while performing physical therapy exercises to demonstrate functional mobility (Progressing)       Start:  03/26/25    Expected End:  04/30/25                Ruslan Alves, PTA

## 2025-04-24 ENCOUNTER — CLINICAL SUPPORT (OUTPATIENT)
Dept: REHABILITATION | Facility: HOSPITAL | Age: 65
End: 2025-04-24
Payer: COMMERCIAL

## 2025-04-24 DIAGNOSIS — M79.604 LUMBAR PAIN WITH RADIATION DOWN BOTH LEGS: Primary | ICD-10-CM

## 2025-04-24 DIAGNOSIS — M54.50 LUMBAR PAIN WITH RADIATION DOWN BOTH LEGS: Primary | ICD-10-CM

## 2025-04-24 DIAGNOSIS — M79.605 LUMBAR PAIN WITH RADIATION DOWN BOTH LEGS: Primary | ICD-10-CM

## 2025-04-24 PROCEDURE — 97112 NEUROMUSCULAR REEDUCATION: CPT

## 2025-04-24 PROCEDURE — 97110 THERAPEUTIC EXERCISES: CPT

## 2025-04-24 PROCEDURE — 97530 THERAPEUTIC ACTIVITIES: CPT

## 2025-04-28 NOTE — PROGRESS NOTES
Outpatient Rehab    Physical Therapy Visit    Patient Name: Buffy Morin  MRN: [id number]  YOB: 1960  Encounter Date: 4/24/2025    Therapy Diagnosis:   Encounter Diagnosis   Name Primary?    Lumbar pain with radiation down both legs Yes     Physician: Oli Arndt MD    Physician Orders: Eval and Treat  Medical Diagnosis: Radiculopathy, lumbar region  Other cervical disc degeneration, unspecified cervical region  Radiculopathy, cervical region    Visit # / Visits Authorized:  6 / 20  Insurance Authorization Period: 3/19/2025 to 12/31/2025  Date of Evaluation: 3/25/2025  Plan of Care Certification: 3/25/2025 to 5/30/2025     PT/PTA: PT   Number of PTA visits since last PT visit:0  Time In: 1358   Time Out: 1459  Total Time: 61   Total Billable Time:  61 minutes    FOTO: See media section.          Subjective   Continues to report low back pain into left lower extremity.         Objective            Treatment:  Therapeutic Exercise  TE 1: Bike for endogenous release of opioids: 10 minutes  TE 2: Glute/core series: posterior pelvic tilts, bridges 2-3 x 10 reps each, 5 second holds  Balance/Neuromuscular Re-Education  NMR 2: Open books: 2 x 10 reps each side  NMR 3: Lateral band walks: 3 laps - increased LLE pain  NMR 5: supine nerve glides, supine peroneal bias nerve glides: 2 x 10 reps each side  Therapeutic Activity  TA 2: Shuttle DL (50 lbs): 3 x 10 reps    Time Entry(in minutes):  Neuromuscular Re-Education Time Entry: 29  Therapeutic Activity Time Entry: 8  Therapeutic Exercise Time Entry: 24    Assessment & Plan   Assessment: uBffy presented to physical therapy today with continued reports of low back pain. Pain increased with lateral glute activation. Pain resolved with peroneal nerve biased nerve glides. She tolerated today's session well and reported fatigue at the end of the session. Will continue to progress as tolerated.  Evaluation/Treatment Tolerance: Patient tolerated treatment  well    Patient will continue to benefit from skilled outpatient physical therapy to address the deficits listed in the problem list box on initial evaluation, provide pt/family education and to maximize pt's level of independence in the home and community environment.     Patient's spiritual, cultural, and educational needs considered and patient agreeable to plan of care and goals.           Plan: Resume periscapular resistance training    Goals:   Active       Ambulation/movement       Patient will walk for 20 minutes with </= 3/10 pain to demonstrate improved functional strength (Progressing)       Start:  03/26/25    Expected End:  05/30/25               Functional outcome       Patient will show a significant change in FOTO patient-reported outcome tool to demonstrate subjective improvement (Progressing)       Start:  03/26/25    Expected End:  05/30/25            Patient stated goal: to decrease lumbar pain and return to prior level of function  (Progressing)       Start:  03/26/25    Expected End:  05/30/25            Patient will demonstrate independence in home program for support of progression (Met)       Start:  03/26/25    Expected End:  04/30/25    Resolved:  04/10/25            Pain       Patient will report a 2 point reduction in pain while performing physical therapy exercises to demonstrate functional mobility (Progressing)       Start:  03/26/25    Expected End:  04/30/25                Felicitas Aguayo, PT, DPT

## 2025-04-29 ENCOUNTER — CLINICAL SUPPORT (OUTPATIENT)
Dept: REHABILITATION | Facility: HOSPITAL | Age: 65
End: 2025-04-29
Payer: COMMERCIAL

## 2025-04-29 DIAGNOSIS — M79.605 LUMBAR PAIN WITH RADIATION DOWN BOTH LEGS: Primary | ICD-10-CM

## 2025-04-29 DIAGNOSIS — M79.604 LUMBAR PAIN WITH RADIATION DOWN BOTH LEGS: Primary | ICD-10-CM

## 2025-04-29 DIAGNOSIS — M54.50 LUMBAR PAIN WITH RADIATION DOWN BOTH LEGS: Primary | ICD-10-CM

## 2025-04-29 PROCEDURE — 97110 THERAPEUTIC EXERCISES: CPT

## 2025-04-29 PROCEDURE — 97112 NEUROMUSCULAR REEDUCATION: CPT

## 2025-04-29 PROCEDURE — 97530 THERAPEUTIC ACTIVITIES: CPT

## 2025-04-30 NOTE — PROGRESS NOTES
Outpatient Rehab    Physical Therapy Visit    Patient Name: Buffy Morin  MRN: [id number]  YOB: 1960  Encounter Date: 4/29/2025    Therapy Diagnosis:   Encounter Diagnosis   Name Primary?    Lumbar pain with radiation down both legs Yes     Physician: Oli Arndt MD    Physician Orders: Eval and Treat  Medical Diagnosis: Radiculopathy, lumbar region  Other cervical disc degeneration, unspecified cervical region  Radiculopathy, cervical region    Visit # / Visits Authorized:  7 / 20  Insurance Authorization Period: 3/19/2025 to 12/31/2025  Date of Evaluation: 3/25/2025  Plan of Care Certification: 3/25/2025 to 5/30/2025     PT/PTA: PT   Number of PTA visits since last PT visit:0  Time In: 1400   Time Out: 1455  Total Time: 55   Total Billable Time:  55 minutes    FOTO: See media section.        Subjective   Continues to report low back pain into left lower extremity. Reports overall feeling better..         Objective            Treatment:  Therapeutic Exercise  TE 1: Nustep for endogenous release of opioids: 10 minutes  TE 2: Glute/core series: clamshells, BKFO (RedTB), bridges 2-3 x 10 reps each, 5 second holds  Balance/Neuromuscular Re-Education  NMR 1: Standing hip extensions (RedTB): 2 x 10 reps  NMR 5: supine nerve glides, supine peroneal bias nerve glides: 2 x 10 reps each side  Therapeutic Activity  TA 2: Shuttle DL (50-67.5 lbs): 3 x 10 reps    Time Entry(in minutes):  Neuromuscular Re-Education Time Entry: 23  Therapeutic Activity Time Entry: 9  Therapeutic Exercise Time Entry: 23    Assessment & Plan   Assessment: Buffy presented to physical therapy today with similar reports of low back pain radiating into left leg. Pain resolved with nerve glides usually, but not always. She tolerated today's treatment well and is agreeable to moving plan to 1x/week. Will continue to progress as tolerated.  Evaluation/Treatment Tolerance: Patient tolerated treatment well    Patient will continue to  benefit from skilled outpatient physical therapy to address the deficits listed in the problem list box on initial evaluation, provide pt/family education and to maximize pt's level of independence in the home and community environment.     Patient's spiritual, cultural, and educational needs considered and patient agreeable to plan of care and goals.           Plan: Resume periscapular resistance training. Reassess next visit.    Goals:   Active       Ambulation/movement       Patient will walk for 20 minutes with </= 3/10 pain to demonstrate improved functional strength (Progressing)       Start:  03/26/25    Expected End:  05/30/25               Functional outcome       Patient will show a significant change in FOTO patient-reported outcome tool to demonstrate subjective improvement (Progressing)       Start:  03/26/25    Expected End:  05/30/25            Patient stated goal: to decrease lumbar pain and return to prior level of function  (Progressing)       Start:  03/26/25    Expected End:  05/30/25            Patient will demonstrate independence in home program for support of progression (Met)       Start:  03/26/25    Expected End:  04/30/25    Resolved:  04/10/25            Pain       Patient will report a 2 point reduction in pain while performing physical therapy exercises to demonstrate functional mobility (Progressing)       Start:  03/26/25    Expected End:  04/30/25                Felicitas Aguayo, PT, DPT

## 2025-05-01 ENCOUNTER — CLINICAL SUPPORT (OUTPATIENT)
Dept: REHABILITATION | Facility: HOSPITAL | Age: 65
End: 2025-05-01
Payer: COMMERCIAL

## 2025-05-01 DIAGNOSIS — M79.604 LUMBAR PAIN WITH RADIATION DOWN BOTH LEGS: Primary | ICD-10-CM

## 2025-05-01 DIAGNOSIS — M79.605 LUMBAR PAIN WITH RADIATION DOWN BOTH LEGS: Primary | ICD-10-CM

## 2025-05-01 DIAGNOSIS — M54.50 LUMBAR PAIN WITH RADIATION DOWN BOTH LEGS: Primary | ICD-10-CM

## 2025-05-01 PROCEDURE — 97112 NEUROMUSCULAR REEDUCATION: CPT

## 2025-05-01 PROCEDURE — 97110 THERAPEUTIC EXERCISES: CPT

## 2025-05-02 NOTE — PROGRESS NOTES
Outpatient Rehab    Physical Therapy Progress Note    Patient Name: Buffy Morin  MRN: [id number]  YOB: 1960  Encounter Date: 5/1/2025    Therapy Diagnosis:   Encounter Diagnosis   Name Primary?    Lumbar pain with radiation down both legs Yes     Physician: Oli Arndt MD    Physician Orders: Eval and Treat  Medical Diagnosis: Radiculopathy, lumbar region  Other cervical disc degeneration, unspecified cervical region  Radiculopathy, cervical region    Visit # / Visits Authorized:  8 / 20  Insurance Authorization Period: 3/19/2025 to 12/31/2025  Date of Evaluation: 3/25/2025  Plan of Care Certification: 3/25/2025 to 5/30/2025     PT/PTA: PT   Number of PTA visits since last PT visit:0  Time In: 1350   Time Out: 1445  Total Time: 55   Total Billable Time:  55 minutes    FOTO:  See media section.          Subjective   Patient reports that she had a really good day today and was able to walk around the building without any back pain.         Objective      Lumbar Range of Motion   Active (deg) Passive (deg) Pain   Flexion 100       Extension 75   Yes   Right Lateral Flexion 100       Right Rotation 100       Left Lateral Flexion 100       Left Rotation 100         Numbers represent percentage of available active range                 Hip Strength - Planes of Motion   Right Strength Right Pain Left Strength Left  Pain   Flexion (L2) 5   5     Extension           ABduction           ADduction           Internal Rotation 5   5     External Rotation 5   4+         Knee Strength   Right Strength Right Pain Left Strength Left  Pain   Flexion (S2) 5   5     Prone Flexion           Extension (L3) 5   4+            Ankle/Foot Strength - Planes of Motion   Right Strength Right Pain Left Strength Left  Pain   Dorsiflexion (L4) 5   5     Plantar Flexion (S1)           Inversion           Eversion           Great Toe Flexion           Great Toe Extension (L5)           Lesser Toes Flexion           Lesser Toes  Extension                    Treatment:  Therapeutic Exercise  TE 1: Nustep for endogenous release of opioids: 10 minutes  TE 2: Glute/core series: clamshells, BKFO (RedTB), bridges 2-3 x 10 reps each, 5 second holds  TE 3: -  Balance/Neuromuscular Re-Education  NMR 2: Open books: 2 x 10 reps each side  NMR 5: supine nerve glides, supine peroneal bias nerve glides: 2 x 10 reps each side  NMR 6: resisted rows (RedTB): 3 x 10 reps  NMR 7: DKTC 3 x 10 reps, Swiss ball rolls outs 30 reps    Time Entry(in minutes):  Neuromuscular Re-Education Time Entry: 32  Therapeutic Exercise Time Entry: 23    Assessment & Plan   Assessment: Upon reassessment, Buffy demonstrates improved symptoms with active range of motion in lumbar spine. She tolerated today's session well. She demonstrates improved independence with exercises throughout session. Will continue to progress as tolerated. Will continue with 1x/week.  Evaluation/Treatment Tolerance: Patient tolerated treatment well    Patient will continue to benefit from skilled outpatient physical therapy to address the deficits listed in the problem list box on initial evaluation, provide pt/family education and to maximize pt's level of independence in the home and community environment.     Patient's spiritual, cultural, and educational needs considered and patient agreeable to plan of care and goals.           Plan: Resume periscapular resistance training.    Goals:   Active       Ambulation/movement       Patient will walk for 20 minutes with </= 3/10 pain to demonstrate improved functional strength (Progressing)       Start:  03/26/25    Expected End:  05/30/25               Functional outcome       Patient will show a significant change in FOTO patient-reported outcome tool to demonstrate subjective improvement (Progressing)       Start:  03/26/25    Expected End:  05/30/25            Patient stated goal: to decrease lumbar pain and return to prior level of function   (Progressing)       Start:  03/26/25    Expected End:  05/30/25            Patient will demonstrate independence in home program for support of progression (Met)       Start:  03/26/25    Expected End:  04/30/25    Resolved:  04/10/25           Resolved       Pain       Patient will report a 2 point reduction in pain while performing physical therapy exercises to demonstrate functional mobility (Met)       Start:  03/26/25    Expected End:  04/30/25    Resolved:  05/02/25             Felicitas Aguayo, PT, DPT

## 2025-05-08 ENCOUNTER — CLINICAL SUPPORT (OUTPATIENT)
Dept: REHABILITATION | Facility: HOSPITAL | Age: 65
End: 2025-05-08
Payer: COMMERCIAL

## 2025-05-08 DIAGNOSIS — M54.50 LUMBAR PAIN WITH RADIATION DOWN BOTH LEGS: Primary | ICD-10-CM

## 2025-05-08 DIAGNOSIS — M79.605 LUMBAR PAIN WITH RADIATION DOWN BOTH LEGS: Primary | ICD-10-CM

## 2025-05-08 DIAGNOSIS — M79.604 LUMBAR PAIN WITH RADIATION DOWN BOTH LEGS: Primary | ICD-10-CM

## 2025-05-08 PROCEDURE — 97110 THERAPEUTIC EXERCISES: CPT

## 2025-05-08 PROCEDURE — 97112 NEUROMUSCULAR REEDUCATION: CPT

## 2025-05-08 PROCEDURE — 97530 THERAPEUTIC ACTIVITIES: CPT

## 2025-05-08 NOTE — PROGRESS NOTES
Outpatient Rehab    Physical Therapy Visit    Patient Name: Buffy Morin  MRN: [id number]  YOB: 1960  Encounter Date: 5/8/2025    Therapy Diagnosis:   Encounter Diagnosis   Name Primary?    Lumbar pain with radiation down both legs Yes     Physician: Oli Arndt MD    Physician Orders: Eval and Treat  Medical Diagnosis: Radiculopathy, lumbar region  Other cervical disc degeneration, unspecified cervical region  Radiculopathy, cervical region    Visit # / Visits Authorized:  9 / 20  Insurance Authorization Period: 3/19/2025 to 12/31/2025  Date of Evaluation: 3/25/2025  Plan of Care Certification: 3/25/2025 to 5/30/2025     PT/PTA: PT   Number of PTA visits since last PT visit:0  Time In: 1359   Time Out: 1456  Total Time (in minutes): 57   Total Billable Time (in minutes):  57 minutes    FOTO: See media section.        Subjective   Patient reports that she had one bad day this week where her back pain increased. Reports shoulder pain as well..         Objective            Treatment:  Therapeutic Exercise  TE 1: Nustep for endogenous release of opioids: 10 minutes  Balance/Neuromuscular Re-Education  NMR 5: supine peroneal bias nerve glides: 2 x 10 reps each side  NMR 6: resisted rows (GreenTB), resisted IR/ER (RedTB): 3 x 10 reps  NMR 8: BKFO (RedTB): 3 x 10 reps  NMR 9: FOTO administered  Therapeutic Activity  TA 2: Shuttle DL (75 lbs): 3 x 12 reps    Time Entry(in minutes):  Neuromuscular Re-Education Time Entry: 38  Therapeutic Activity Time Entry: 9  Therapeutic Exercise Time Entry: 10    Assessment & Plan   Assessment: Buffy reports to PT with no new reports of pain. She tolerated today's session well and had one exacerbation of low back pain. Her pain resolved after performing supine nerve glides. Will continue to progress home exercises.  Evaluation/Treatment Tolerance: Patient tolerated treatment well    Patient will continue to benefit from skilled outpatient physical therapy to address  the deficits listed in the problem list box on initial evaluation, provide pt/family education and to maximize pt's level of independence in the home and community environment.     Patient's spiritual, cultural, and educational needs considered and patient agreeable to plan of care and goals.           Plan: Resume periscapular resistance training.    Goals:   Active       Ambulation/movement       Patient will walk for 20 minutes with </= 3/10 pain to demonstrate improved functional strength (Progressing)       Start:  03/26/25    Expected End:  05/30/25               Functional outcome       Patient will show a significant change in FOTO patient-reported outcome tool to demonstrate subjective improvement (Progressing)       Start:  03/26/25    Expected End:  05/30/25            Patient stated goal: to decrease lumbar pain and return to prior level of function  (Progressing)       Start:  03/26/25    Expected End:  05/30/25            Patient will demonstrate independence in home program for support of progression (Met)       Start:  03/26/25    Expected End:  04/30/25    Resolved:  04/10/25           Resolved       Pain       Patient will report a 2 point reduction in pain while performing physical therapy exercises to demonstrate functional mobility (Met)       Start:  03/26/25    Expected End:  04/30/25    Resolved:  05/02/25             Felicitas Aguayo, PT, DPT

## 2025-05-12 ENCOUNTER — TELEPHONE (OUTPATIENT)
Dept: PAIN MEDICINE | Facility: CLINIC | Age: 65
End: 2025-05-12
Payer: COMMERCIAL

## 2025-05-12 NOTE — TELEPHONE ENCOUNTER
----- Message from Jayshree sent at 5/12/2025  7:46 AM CDT -----  Nature of Call: requesting clarity on injection name Best Call Back Number:  [phone] Additional Information:YUMIKO BOBBY calling regarding Patient Advice for needing to get the name of the injection that was given to her on 09/20/23. please call the PT to clarify if this is correct

## 2025-05-12 NOTE — TELEPHONE ENCOUNTER
Returned patient's call. Patient had questions about an injection. Informed that the date in question she did not receive and injection. Patient verbalized understanding.

## 2025-05-15 ENCOUNTER — CLINICAL SUPPORT (OUTPATIENT)
Dept: REHABILITATION | Facility: HOSPITAL | Age: 65
End: 2025-05-15
Payer: COMMERCIAL

## 2025-05-15 DIAGNOSIS — M79.604 LUMBAR PAIN WITH RADIATION DOWN BOTH LEGS: Primary | ICD-10-CM

## 2025-05-15 DIAGNOSIS — M54.50 LUMBAR PAIN WITH RADIATION DOWN BOTH LEGS: Primary | ICD-10-CM

## 2025-05-15 DIAGNOSIS — M79.605 LUMBAR PAIN WITH RADIATION DOWN BOTH LEGS: Primary | ICD-10-CM

## 2025-05-15 PROCEDURE — 97112 NEUROMUSCULAR REEDUCATION: CPT

## 2025-05-15 PROCEDURE — 97530 THERAPEUTIC ACTIVITIES: CPT

## 2025-05-16 NOTE — PROGRESS NOTES
Outpatient Rehab    Physical Therapy Visit    Patient Name: Buffy Morin  MRN: [id number]  YOB: 1960  Encounter Date: 5/15/2025    Therapy Diagnosis:   Encounter Diagnosis   Name Primary?    Lumbar pain with radiation down both legs Yes     Physician: Oli Arndt MD    Physician Orders: Eval and Treat  Medical Diagnosis: Radiculopathy, lumbar region  Other cervical disc degeneration, unspecified cervical region  Radiculopathy, cervical region    Visit # / Visits Authorized:  10 / 20  Insurance Authorization Period: 3/19/2025 to 12/31/2025  Date of Evaluation: 3/25/2025  Plan of Care Certification: 3/26/2025 to 5/30/2025      PT/PTA: PT   Number of PTA visits since last PT visit:0  Time In: 1359   Time Out: 1450  Total Time (in minutes): 51   Total Billable Time (in minutes):  51 minutes    FOTO: See media section.     Precautions:       Subjective   Patient reports no new low back pain.         Objective            Treatment:  Balance/Neuromuscular Re-Education  NMR 1: Standing hip extensions: 3 x 10 reps  NMR 6: cable rows (10#), resistedextensions (15#): 3 x 10 reps  NMR 9: CC palof press (15#): 3 x 10 reps, 5 seconds  NMR 10: Education on progression of home exercise program and pain management  Therapeutic Activity  TA 2: Shuttle DL (75 lbs): 3 x 10 reps  TA 3: Sit to stands from standard chair: 3 x 10 reps    Time Entry(in minutes):  Neuromuscular Re-Education Time Entry: 38  Therapeutic Activity Time Entry: 13    Assessment & Plan   Assessment: Buffy reports to PT with no new reports of pain. She tolerated today's session well. Progressed home exercises to include comprehensive lumbar mobility, hip strengthening, and core strengthening. Will prepare for discharge at next visit. Will continue to progress home exercises.  Evaluation/Treatment Tolerance: Patient tolerated treatment well    Patient will continue to benefit from skilled outpatient physical therapy to address the deficits listed  in the problem list box on initial evaluation, provide pt/family education and to maximize pt's level of independence in the home and community environment.     Patient's spiritual, cultural, and educational needs considered and patient agreeable to plan of care and goals.           Plan: Discharge at next visit    Goals:   Active       Ambulation/movement       Patient will walk for 20 minutes with </= 3/10 pain to demonstrate improved functional strength (Progressing)       Start:  03/26/25    Expected End:  05/30/25               Functional outcome       Patient will show a significant change in FOTO patient-reported outcome tool to demonstrate subjective improvement (Progressing)       Start:  03/26/25    Expected End:  05/30/25            Patient stated goal: to decrease lumbar pain and return to prior level of function  (Progressing)       Start:  03/26/25    Expected End:  05/30/25            Patient will demonstrate independence in home program for support of progression (Met)       Start:  03/26/25    Expected End:  04/30/25    Resolved:  04/10/25           Resolved       Pain       Patient will report a 2 point reduction in pain while performing physical therapy exercises to demonstrate functional mobility (Met)       Start:  03/26/25    Expected End:  04/30/25    Resolved:  05/02/25             Felicitas Aguayo, PT, DPT

## 2025-05-20 DIAGNOSIS — M17.0 BILATERAL PRIMARY OSTEOARTHRITIS OF KNEE: Primary | ICD-10-CM

## 2025-05-22 ENCOUNTER — CLINICAL SUPPORT (OUTPATIENT)
Dept: REHABILITATION | Facility: HOSPITAL | Age: 65
End: 2025-05-22
Payer: COMMERCIAL

## 2025-05-22 DIAGNOSIS — M54.50 LUMBAR PAIN WITH RADIATION DOWN BOTH LEGS: Primary | ICD-10-CM

## 2025-05-22 DIAGNOSIS — M79.604 LUMBAR PAIN WITH RADIATION DOWN BOTH LEGS: Primary | ICD-10-CM

## 2025-05-22 DIAGNOSIS — M79.605 LUMBAR PAIN WITH RADIATION DOWN BOTH LEGS: Primary | ICD-10-CM

## 2025-05-22 PROCEDURE — 97112 NEUROMUSCULAR REEDUCATION: CPT

## 2025-05-22 PROCEDURE — 97110 THERAPEUTIC EXERCISES: CPT

## 2025-05-22 PROCEDURE — 97530 THERAPEUTIC ACTIVITIES: CPT

## 2025-05-22 NOTE — PROGRESS NOTES
Outpatient Rehab    Physical Therapy Discharge    Patient Name: Buffy Morin  MRN: [id number]  YOB: 1960  Encounter Date: 5/22/2025    Therapy Diagnosis:   Encounter Diagnosis   Name Primary?    Lumbar pain with radiation down both legs Yes     Physician: Oli Arndt MD    Physician Orders: Eval and Treat  Medical Diagnosis: Radiculopathy, lumbar region  Other cervical disc degeneration, unspecified cervical region  Radiculopathy, cervical region    Visit # / Visits Authorized:  11 / 20  Insurance Authorization Period: 3/19/2025 to 12/31/2025  Date of Evaluation: 3/25/2025  Plan of Care Certification: 3/26/2025 to 5/30/2025      PT/PTA:     Number of PTA visits since last PT visit:   Time In: 1359   Time Out: 1448  Total Time (in minutes): 49   Total Billable Time (in minutes):  49 minutes    FOTO:  See media section.     Precautions: standard       Subjective   Patient reports that her knee is limiting her most and is going to get injections soon. She is agreeable to discharge at this time. She is not interested in the [facility] program at this time..         Objective            Treatment:  Therapeutic Exercise  TE 1: Nustep for endogenous release of opioids: 10 minutes  Balance/Neuromuscular Re-Education  NMR 4: Swill ball roll outs for neural tension: 3 x 10 reps  NMR 6: resisted rows (GreenTB), resisted palof press (GreenTB), resisted extensions (GreenTB): 3 x 10 reps  Therapeutic Activity  TA 1: Suitcase carries (12#): 5 laps each hand  TA 4: Education on home exercise program and physical activity regimen    Time Entry(in minutes):  Neuromuscular Re-Education Time Entry: 31  Therapeutic Activity Time Entry: 8  Therapeutic Exercise Time Entry: 10    Assessment & Plan   Assessment: Buffy reports to PT with reports of overall improved lumbar pain. She is following up with MD for knee injecions.  She is agreeable to discharge today with home exercise program.  Evaluation/Treatment  Tolerance: Patient tolerated treatment well    The patient's spiritual, cultural, and educational needs were considered, and the patient is agreeable to the plan of care and goals.           Plan: Patient is discharged from PT with home exercise program.    Goals:   Active       Functional outcome       Patient will show a significant change in FOTO patient-reported outcome tool to demonstrate subjective improvement (Progressing)       Start:  03/26/25    Expected End:  05/30/25            Patient stated goal: to decrease lumbar pain and return to prior level of function  (Met)       Start:  03/26/25    Expected End:  05/30/25    Resolved:  05/22/25         Patient will demonstrate independence in home program for support of progression (Met)       Start:  03/26/25    Expected End:  04/30/25    Resolved:  04/10/25           Resolved       Ambulation/movement       Patient will walk for 20 minutes with </= 3/10 pain to demonstrate improved functional strength (Met)       Start:  03/26/25    Expected End:  05/30/25    Resolved:  05/22/25            Pain       Patient will report a 2 point reduction in pain while performing physical therapy exercises to demonstrate functional mobility (Met)       Start:  03/26/25    Expected End:  04/30/25    Resolved:  05/02/25             Felicitas Aguayo, PT, DPT

## 2025-05-29 ENCOUNTER — OFFICE VISIT (OUTPATIENT)
Dept: ORTHOPEDICS | Facility: CLINIC | Age: 65
End: 2025-05-29
Payer: COMMERCIAL

## 2025-05-29 VITALS — WEIGHT: 171.94 LBS | HEIGHT: 62 IN | BODY MASS INDEX: 31.64 KG/M2

## 2025-05-29 DIAGNOSIS — M17.0 BILATERAL PRIMARY OSTEOARTHRITIS OF KNEE: Primary | ICD-10-CM

## 2025-05-29 PROCEDURE — 99999 PR PBB SHADOW E&M-EST. PATIENT-LVL IV: CPT | Mod: PBBFAC,,, | Performed by: PHYSICIAN ASSISTANT

## 2025-05-29 PROCEDURE — 99499 UNLISTED E&M SERVICE: CPT | Mod: S$GLB,,, | Performed by: PHYSICIAN ASSISTANT

## 2025-05-29 NOTE — PROGRESS NOTES
Buffy Morin is a 64 y.o. year old her here today for her 1st Euflexxa injection for degenerative changes of her bilateral knee . she was last seen and treated in the clinic on 10/13/2023. There has been no significant change in her medical status since her last visit. No Fever, chills, malaise, or unexplained weight change.      Allergies, Medications, past medical and surgical history were reviewed .    Examination of the knee demonstrates  No evidence of edema, erythema , echymosis strength and range of motion are unchanged from previous visit.    The risks, benefits, pros, cons, and potential side effects of the procedure were discussed with the patient in detail all questions were answered.  The patient is comfortable and willing to proceed with the procedure. Verbal consent was obtained and the proper joint was identified by the patient and provider.     The injection site was identified and the skin was prepared with a chlorhexidine solution. The  bilateral knee  joint was injected with 2 ml of Euflexxa solution under sterile technique. Buffy Morin tolerated the procedure well, she was advised to rest the knee today, ice and elevation. I may take 3 -6 weeks following the last injection to get the full benefit of the medication.  I will see her back in 1 week. Sooner if he has any problems or concerns.           .     ICD-10-CM ICD-9-CM   1. Bilateral primary osteoarthritis of knee  M17.0 715.16

## 2025-06-05 ENCOUNTER — OFFICE VISIT (OUTPATIENT)
Dept: ORTHOPEDICS | Facility: CLINIC | Age: 65
End: 2025-06-05
Payer: COMMERCIAL

## 2025-06-05 DIAGNOSIS — M17.0 PRIMARY OSTEOARTHRITIS OF BOTH KNEES: Primary | ICD-10-CM

## 2025-06-05 PROCEDURE — 99999 PR PBB SHADOW E&M-EST. PATIENT-LVL III: CPT | Mod: PBBFAC,,, | Performed by: PHYSICIAN ASSISTANT

## 2025-06-12 ENCOUNTER — OFFICE VISIT (OUTPATIENT)
Dept: ORTHOPEDICS | Facility: CLINIC | Age: 65
End: 2025-06-12
Payer: COMMERCIAL

## 2025-06-12 VITALS — HEIGHT: 62 IN | WEIGHT: 167.56 LBS | BODY MASS INDEX: 30.83 KG/M2

## 2025-06-12 DIAGNOSIS — M17.0 PRIMARY OSTEOARTHRITIS OF BOTH KNEES: Primary | ICD-10-CM

## 2025-06-12 PROCEDURE — 99999 PR PBB SHADOW E&M-EST. PATIENT-LVL III: CPT | Mod: PBBFAC,,, | Performed by: PHYSICIAN ASSISTANT

## 2025-06-12 NOTE — PROGRESS NOTES
Buffy Morin is a 64 y.o. year old her here today for her 3rd Euflexxa injection for degenerative changes of her bilateral knee . she was last seen and treated in the clinic on 6/5/2025. There has been no significant change in her medical status since her last visit. No Fever, chills, malaise, or unexplained weight change.      Allergies, Medications, past medical and surgical history were reviewed .    Examination of the knee demonstrates  No evidence of edema, erythema , echymosis strength and range of motion are unchanged from previous visit.    The risks, benefits, pros, cons, and potential side effects of the procedure were discussed with the patient in detail all questions were answered.  The patient is comfortable and willing to proceed with the procedure. Verbal consent was obtained and the proper joint was identified by the patient and provider.     The injection site was identified and the skin was prepared with a chlorhexidine solution. The  bilateral knee  joint was injected with 2 ml of Euflexxa solution under sterile technique. Buffy Morin tolerated the procedure well, she was advised to rest the knee today, ice and elevation. I may take 3 -6 weeks following the last injection to get the full benefit of the medication.  I will see her back in 6 months. Sooner if he has any problems or concerns.           .     ICD-10-CM ICD-9-CM   1. Primary osteoarthritis of both knees  M17.0 715.16

## 2025-06-19 ENCOUNTER — OFFICE VISIT (OUTPATIENT)
Dept: PAIN MEDICINE | Facility: CLINIC | Age: 65
End: 2025-06-19
Payer: COMMERCIAL

## 2025-06-19 VITALS
WEIGHT: 168.44 LBS | OXYGEN SATURATION: 100 % | TEMPERATURE: 98 F | HEART RATE: 66 BPM | HEIGHT: 62 IN | BODY MASS INDEX: 31 KG/M2 | DIASTOLIC BLOOD PRESSURE: 81 MMHG | RESPIRATION RATE: 18 BRPM | SYSTOLIC BLOOD PRESSURE: 191 MMHG

## 2025-06-19 DIAGNOSIS — M43.16 SPONDYLOLISTHESIS OF LUMBAR REGION: ICD-10-CM

## 2025-06-19 DIAGNOSIS — M54.16 LUMBAR RADICULOPATHY: Primary | ICD-10-CM

## 2025-06-19 DIAGNOSIS — M51.362 DEGENERATION OF INTERVERTEBRAL DISC OF LUMBAR REGION WITH DISCOGENIC BACK PAIN AND LOWER EXTREMITY PAIN: ICD-10-CM

## 2025-06-19 PROCEDURE — 3288F FALL RISK ASSESSMENT DOCD: CPT | Mod: CPTII,S$GLB,, | Performed by: STUDENT IN AN ORGANIZED HEALTH CARE EDUCATION/TRAINING PROGRAM

## 2025-06-19 PROCEDURE — 99999 PR PBB SHADOW E&M-EST. PATIENT-LVL IV: CPT | Mod: PBBFAC,,, | Performed by: STUDENT IN AN ORGANIZED HEALTH CARE EDUCATION/TRAINING PROGRAM

## 2025-06-19 PROCEDURE — 1159F MED LIST DOCD IN RCRD: CPT | Mod: CPTII,S$GLB,, | Performed by: STUDENT IN AN ORGANIZED HEALTH CARE EDUCATION/TRAINING PROGRAM

## 2025-06-19 PROCEDURE — G2211 COMPLEX E/M VISIT ADD ON: HCPCS | Mod: S$GLB,,, | Performed by: STUDENT IN AN ORGANIZED HEALTH CARE EDUCATION/TRAINING PROGRAM

## 2025-06-19 PROCEDURE — 3077F SYST BP >= 140 MM HG: CPT | Mod: CPTII,S$GLB,, | Performed by: STUDENT IN AN ORGANIZED HEALTH CARE EDUCATION/TRAINING PROGRAM

## 2025-06-19 PROCEDURE — 3079F DIAST BP 80-89 MM HG: CPT | Mod: CPTII,S$GLB,, | Performed by: STUDENT IN AN ORGANIZED HEALTH CARE EDUCATION/TRAINING PROGRAM

## 2025-06-19 PROCEDURE — 1160F RVW MEDS BY RX/DR IN RCRD: CPT | Mod: CPTII,S$GLB,, | Performed by: STUDENT IN AN ORGANIZED HEALTH CARE EDUCATION/TRAINING PROGRAM

## 2025-06-19 PROCEDURE — 99214 OFFICE O/P EST MOD 30 MIN: CPT | Mod: S$GLB,,, | Performed by: STUDENT IN AN ORGANIZED HEALTH CARE EDUCATION/TRAINING PROGRAM

## 2025-06-19 PROCEDURE — 3008F BODY MASS INDEX DOCD: CPT | Mod: CPTII,S$GLB,, | Performed by: STUDENT IN AN ORGANIZED HEALTH CARE EDUCATION/TRAINING PROGRAM

## 2025-06-19 PROCEDURE — 1101F PT FALLS ASSESS-DOCD LE1/YR: CPT | Mod: CPTII,S$GLB,, | Performed by: STUDENT IN AN ORGANIZED HEALTH CARE EDUCATION/TRAINING PROGRAM

## 2025-06-19 PROCEDURE — 1125F AMNT PAIN NOTED PAIN PRSNT: CPT | Mod: CPTII,S$GLB,, | Performed by: STUDENT IN AN ORGANIZED HEALTH CARE EDUCATION/TRAINING PROGRAM

## 2025-06-19 RX ORDER — GABAPENTIN 100 MG/1
100 CAPSULE ORAL 3 TIMES DAILY
Qty: 90 CAPSULE | Refills: 2 | Status: SHIPPED | OUTPATIENT
Start: 2025-06-19

## 2025-06-19 RX ORDER — LIDOCAINE 50 MG/G
1 PATCH TOPICAL DAILY
Qty: 30 PATCH | Refills: 2 | Status: SHIPPED | OUTPATIENT
Start: 2025-06-19

## 2025-06-19 NOTE — PROGRESS NOTES
Chronic patient Established Note (Follow up visit)      SUBJECTIVE:  INTERVAL HISTORY 6/19/2025:  Ms. Morin returns for back and left > right leg pain. Current Pain level is 2/10.  She feels that the gabapentin, along with the lidocaine patches, and the physical therapy home exercises have been helping a great deal. She does feel that her pain eases up throughout the day. She does report that pain sometimes radiates down the entire left leg. She takes gabapentin, meloxicam.  She states that duloxetine made her space out. She does feel gabapentin has been helpful (once a day).  She also recently had knee euflexxa injections with orthopedics and feels this is helping a great deal as well.    INTERVAL HISTORY 3/12/2025:  Buffy Morin presents to the clinic for a follow-up appointment for chronic pain. Current pain intensity is 7/10.  Since the last visit, Buffy Morin states:  she has started to have recurrence of her left leg pain.  She denies any right lower leg pain, but she admits to bilateral thigh pain.  She has been taking meloxicam for the pain, which has also been helping with her sciatica pain.  She has tried a OTC patch which was helping, but it's been 2 weeks since the pain started.    11/8/2023- Buffy Morin is a 63 y.o. female who  has a past medical history of Abnormal Pap smear of cervix, Anxiety, At high risk for breast cancer: TC 7 > 20% 2019 (2/6/2019), Carpal tunnel syndrome, Elevated blood pressure, Family history of breast cancer in sister (10/1/2013), Hypertension, and S/P hysterectomy (9/23/2017).  By history and examination this patient has chronic neck and shoulder  pain without  radiculopathy in the distribution of C4 and C5.  The underlying cause cause is facet arthritis, degenerative disc disease, foraminal stenosis, central canal stenosis, and muscle dysfunction.  Pathology is confirmed by imaging.  We discussed the underlying diagnoses and multiple treatment options including  non-opioid medications, interventional procedures, physical therapy, home exercise, activity modification, and weight loss.  The risks and benefits of each treatment option were discussed and all questions were answered.      PRIOR HISTORY (Dr. Cooney):   The pain is located in the shoulders and doesn't radiate. The pain is described as aching. Exacerbating factors: Lifting. Mitigating factors heat. Symptoms interfere with sleep, work. The patient feels like symptoms have been improving. Patient denies night fever/night sweats, urinary incontinence, bowel incontinence, significant weight loss, significant motor weakness, and loss of sensations.    Original PAIN SCORES:  Best: Pain is 4  Worst: Pain is 8  Current: Pain is 4    INTERVAL HISTORY: (Newest visit at the bottom)   Interval History (Date):   11/8/2023- patient presents for a follow up for bilateral shoulder pain that doesn't radiate. She has been attending PT which has helped her pain and improved  her functionality.  She denies any new pain, denies any profound weakness denies any B/B dysfunction.     Pain Disability Index Review:      6/19/2025     3:19 PM 3/12/2025     3:14 PM 11/8/2023     2:53 PM   Last 3 PDI Scores   Pain Disability Index (PDI) 12 56 8       Pain Medications:   - meloxicam 15mg    Opioid Contract: not applicable     report:  Reviewed and consistent with medication use as prescribed.    Pain Procedures:   12/23/19 - B/L L4/5 TFESI (Eshraghi)  1/16/20 - B/L L4/5 TFESI (Eissa)  8/3/22 - C7/T1 PELON  6/2025 - Euflexxa injections with DISHA Sampson    Physical Therapy/Home Exercise: no, does not recall therapy for her back in the past    Imaging:   MRI LUMBAR SPINE WITHOUT CONTRAST     CLINICAL HISTORY:  lumbar radiculopathy; Spondylolisthesis, lumbar region     TECHNIQUE:  Multiplanar, multisequence MR images were acquired from the thoracolumbar junction to the sacrum without the administration of contrast.     COMPARISON:  None.      FINDINGS:  There is minimal grade I anterolisthesis of L4 on L5.  Lumbar spine alignment otherwise appears within normal limits.  Lumbar vertebral body heights are appropriately maintained.  There is mild chronic anterior wedging of the T12 vertebral body may be degenerative in etiology, noting prominent chronic endplate degenerative changes at T11-T12. There is no evidence of diffuse bone marrow placement process.  There is disc desiccation at L3-L4 and L4-L5.  The conus is normal in appearance and terminates at the L2 level.     T11-T12: There is a broad-based disc bulge with effacement of the anterior thecal sac and at most mild spinal canal narrowing.  There is mild right-sided neural foraminal narrowing.     T12-L1: There is no focal disc herniation.  No significant spinal canal or neural foraminal narrowing.     L1-L2: There is mild bilateral facet arthropathy.  No significant spinal canal or neural foraminal narrowing.     L2-L3: There is a mild circumferential disc bulge, ligamentum flavum thickening, and mild bilateral facet arthropathy.  No significant spinal canal or neural foraminal narrowing.     L3-L4: There is a circumferential disc bulge, ligamentum flavum thickening, and bilateral facet arthropathy.  There is mild spinal canal stenosis.  There is moderate left-sided and mild right-sided neural foraminal narrowing.     L4-L5: There is a circumferential disc bulge with superimposed small central disc protrusion.  There is ligamentum flavum thickening and advanced bilateral facet arthropathy.  Prominent fluid is noted within the bilateral facet articulations with mild adjacent soft tissue edema.  There is moderate spinal canal stenosis.  There is moderate to severe left-sided and mild-to-moderate right-sided neural foraminal narrowing     L5-S1: There is a broad-based disc bulge and bilateral facet arthropathy.  There is mild bilateral neural foraminal narrowing.  No significant spinal canal  stenosis.     Limited views of the posterior abdomen demonstrate bilateral renal T2 hyperintensities which are incompletely characterized but suggestive of cysts.     Impression:     1.  Grade 1 anterolisthesis of L4 on L5.  Multilevel degenerative changes of the lumbar spine, most pronounced at L4-L5 demonstrating moderate spinal canal stenosis as well as bilateral neural foraminal narrowing, left greater than right.  Please see above for level by level details.     2.  Mild chronic anterior wedging of the T12 vertebral body which may be degenerative in etiology, noting intervertebral disc space height loss at T11-T12 with associated chronic endplate degenerative changes.        Electronically signed by:Paul Bledsoe MD  Date:                                            12/14/2019  Time:                                           01:24    MRI CERVICAL SPINE WITHOUT CONTRAST     CLINICAL HISTORY:  Neck pain, chronic, degenerative changes on xray;Myelopathy, acute, cervical spine; Other cervical disc degeneration, unspecified cervical region     TECHNIQUE:  Multiplanar, multisequence MR images of the cervical spine were performed without the administration of contrast.     COMPARISON:  X-ray 04/28/2022     FINDINGS:  Alignment: There is straightening of the cervical lordosis.  Subtle grade 1 anterolisthesis C2 on C3 and C5 on C4.     Vertebrae: Multilevel degenerative endplate sclerosis most severe at C4-5.     Discs: Degenerative findings:     C2-C3: Circumferential disc bulging and mild facet arthrosis.     C3-C4: Circumferential disc osteophyte complex and mild facet arthrosis.     C4-C5: Circumferential disc osteophyte complex and mild facet arthrosis contribute to moderate central canal stenosis and moderate left and mild right-sided foraminal stenosis.     C5-C6: Circumferential disc osteophyte complex mild facet arthrosis.  No central canal stenosis.  Mild bilateral foraminal stenosis.     C6-C7:  Circumferential disc osteophyte complex and facet arthrosis.     C7-T1: Bilateral facet arthrosis.     T1-T2: None     Cord: Normal     Skull base and craniocervical junction: Normal.     Paraspinal muscles & soft tissues: Unremarkable.     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        Electronically signed by:Kwabena Mishra Jr  Date:                                            07/11/2022  Time:                                           09:17    Allergies:   Review of patient's allergies indicates:   Allergen Reactions    No known drug allergies        Current Medications:   Current Medications[1]    REVIEW OF SYSTEMS:    GENERAL:  No new weight loss, malaise or fevers.  HEENT:  Negative for new frequent or significant headaches.  NECK:  Negative for new lumps, goiter, and significant neck swelling.  RESPIRATORY:  Negative for new cough, wheezing or shortness of breath.  CARDIOVASCULAR:  Negative for new chest pain, leg swelling or palpitations.  GI:  Negative for new abdominal discomfort, blood in stools or black stools or change in bowel habits.  MUSCULOSKELETAL:  See HPI.  SKIN:  Negative for new lesions, rash, and itching.  PSYCH:  Negative for new sleep disturbance, mood disorder  HEMATOLOGY/LYMPHOLOGY:  Negative for new prolonged bleeding, bruising easily or swollen nodes.  NEURO:   No new headaches, syncope, paralysis, seizures or tremors.  All other reviewed and negative other than HPI.    Past Medical History:  Past Medical History:   Diagnosis Date    Abnormal Pap smear of cervix     Anxiety     At high risk for breast cancer: TC 7 > 20% 2019 2/6/2019    Carpal tunnel syndrome     Elevated blood pressure     Family history of breast cancer in sister 10/1/2013    Hypertension     S/P hysterectomy 9/23/2017       Past Surgical History:  Past Surgical  History:   Procedure Laterality Date    BREAST BIOPSY Right     2007    COLONOSCOPY N/A 8/19/2019    Procedure: COLONOSCOPY;  Surgeon: ROHIT Romero MD;  [facility]  Service: Endoscopy;  Laterality: N/A;    EPIDURAL STEROID INJECTION INTO CERVICAL SPINE N/A 8/3/2022    Procedure: Injection-steroid-epidural-cervical IL C7-T1;  Surgeon: Justina Hollingsworth MD;  Location[facility] PAIN MGT;  Service: Pain Management;  Laterality: N/A;    HYSTERECTOMY      Total    TRANSFORAMINAL EPIDURAL INJECTION OF STEROID Bilateral 12/23/2019    Procedure: LUMBAR TRANSFORAMINAL BILATERAL L4/5 TF JASON;  Surgeon: Hernesto Lazo MD;  Location: [facility] PAIN MGT;  Service: Pain Management;  Laterality: Bilateral;  NEEDS CONSENT    TRANSFORAMINAL EPIDURAL INJECTION OF STEROID Bilateral 1/16/2020    Procedure: LUMBAR TRANSFORAMINAL BILATERAL L4/5 TF JASON DIRECT REFERRAL;  Surgeon: Pamela Erazo MD;  Location: [facility] PAIN MGT;  Service: Pain Management;  Laterality: Bilateral;  NEEDS CONSENT    TUBAL LIGATION         Family History:  Family History   Problem Relation Name Age of Onset    Cancer Mother          unknown origin    Hypertension Mother      Hypertension Father blood clot     Thyroid disease Sister 7     Breast cancer Sister 7         in her mid-60s, unilateral    Depression Sister 7     Cancer Sister 7         Breast    Anxiety disorder Sister 7     No Known Problems Son Joe     No Known Problems Son Foreign     Ovarian cancer Neg Hx         Social History:  Social History     Socioeconomic History    Marital status:     Number of children: 2   Tobacco Use    Smoking status: Never    Smokeless tobacco: Never   Substance and Sexual Activity    Alcohol use: Yes     Comment: rare    Drug use: No    Sexual activity: Yes   Social History Narrative    Oldest son- Joe Carrasquillo is second son with 2 kids- [place]; Akshat 8 months; Aravind age 5 2023 (grandkids)     Social Drivers of Health     Stress: No Stress Concern  "Present (6/25/2019)    [facility] [place] [facility] [employer]ational [facility] - Occupational Stress Questionnaire     Feeling of Stress : Only a little       OBJECTIVE:    BP (!) 191/81 (BP Location: Left arm, Patient Position: Sitting)   Pulse 66   Temp 98 °F (36.7 °C) (Oral)   Resp 18   Ht 5' 2" (1.575 m)   Wt 76.4 kg (168 lb 6.9 oz)   SpO2 100%   BMI 30.81 kg/m²     PHYSICAL EXAMINATION:  General appearance: Well appearing, in no acute distress, alert and appropriately communicative.  Psych:  Mood and affect appropriate.  Skin: Skin color, texture, turgor normal, no rashes or lesions, in both upper and lower body for exposed skin.  Head/face:  Atraumatic, normocephalic.  Cor: regular rate  Pulm: non-labored breathing  GI: Abdomen non-distended and non-tender.  Back: Straight leg raising in the sitting and supine positions is negative to radicular pain. No pain to palpation over the spine or paraspinal muscles. Slight pain on extension/facet loading  Extremities: No deformities, significant lymphedema, or skin discoloration. No significant open wounds. No major amputations.  Musculoskeletal: hip, sacroiliac and knee provocative maneuvers are negative. Bilateral upper and lower extremity strength is normal and symmetric.  No atrophy or tone abnormalities are noted.  Neuro: Bilateral upper and lower extremity coordination and muscle stretch reflexes abnormalities noted: none.  Clark and/or Clonus: negative; loss of sensation to light touch: none  Gait: normal    CMP  Sodium   Date Value Ref Range Status   08/16/2024 141 136 - 145 mmol/L Final     Potassium   Date Value Ref Range Status   08/16/2024 4.4 3.5 - 5.1 mmol/L Final     Chloride   Date Value Ref Range Status   08/16/2024 107 95 - 110 mmol/L Final     CO2   Date Value Ref Range Status   08/16/2024 24 23 - 29 mmol/L Final     Glucose   Date Value Ref Range Status   08/16/2024 81 70 - 110 mg/dL Final     BUN   Date Value Ref Range Status   08/16/2024 11 8 - 23 " mg/dL Final     Creatinine   Date Value Ref Range Status   08/16/2024 0.9 0.5 - 1.4 mg/dL Final     Calcium   Date Value Ref Range Status   08/16/2024 10.0 8.7 - 10.5 mg/dL Final     Total Protein   Date Value Ref Range Status   08/16/2024 8.3 6.0 - 8.4 g/dL Final     Albumin   Date Value Ref Range Status   08/16/2024 4.4 3.5 - 5.2 g/dL Final     Total Bilirubin   Date Value Ref Range Status   08/16/2024 0.4 0.1 - 1.0 mg/dL Final     Comment:     For infants and newborns, interpretation of results should be based  on gestational age, weight and in agreement with clinical  observations.    Premature Infant recommended reference ranges:  Up to 24 hours.............<8.0 mg/dL  Up to 48 hours............<12.0 mg/dL  3-5 days..................<15.0 mg/dL  6-29 days.................<15.0 mg/dL       Alkaline Phosphatase   Date Value Ref Range Status   08/16/2024 139 (H) 55 - 135 U/L Final     AST   Date Value Ref Range Status   08/16/2024 28 10 - 40 U/L Final     ALT   Date Value Ref Range Status   08/16/2024 31 10 - 44 U/L Final     Anion Gap   Date Value Ref Range Status   08/16/2024 10 8 - 16 mmol/L Final     eGFR   Date Value Ref Range Status   08/16/2024 >60.0 >60 mL/min/1.73 m^2 Final     ASSESSMENT: 64 y.o. year old female with chronic pain, consistent with:     1. Lumbar radiculopathy  gabapentin (NEURONTIN) 100 MG capsule    LIDOcaine (LIDODERM) 5 %      2. Degeneration of intervertebral disc of lumbar region with discogenic back pain and lower extremity pain  gabapentin (NEURONTIN) 100 MG capsule    LIDOcaine (LIDODERM) 5 %      3. Spondylolisthesis of lumbar region          IMPRESSION: Buffy Morin presents today for lower back and left > right leg pain. History and physical exam are consistent with lumbar radiculopathy.  Imaging is consistent with lumbar degenerative disc disease with listhesis at L4/5 and associated foraminal stenosis (remote).  They are doing well today.  They feel they have improved in  terms of their pain control and their functional abilities. They are satisfied with their management of pain thus far.  We can continue with conservative management with a robust home exercise program and medications, as appropriate.    PLAN:   - I have stressed the importance of physical activity and a home exercise plan to help with pain and improve health.  - Patient can continue with medications for now since they are providing benefits, using them appropriately, and without side effects.  - continue gabapentin 100mg TID (currently taking once daily)  - script given for lidoderm 5% patches  - continue home exercise program for lower back pain and lumbar radiculopathy  - RTC as needed  - Counseled patient regarding the importance of activity modification and physical therapy.    The above plan and management options were discussed at length with patient. Patient is in agreement with the above and verbalized understanding.    Oli Hair  06/19/2025             [1]   Current Outpatient Medications   Medication Sig Dispense Refill    amLODIPine (NORVASC) 10 MG tablet Take 0.5 tablets (5 mg total) by mouth once daily. 45 tablet 3    atorvastatin (LIPITOR) 80 MG tablet TAKE 1 TABLET (80 MG TOTAL) BY MOUTH ONCE DAILY. 90 tablet 3    cholecalciferol, vitamin D3, (VITAMIN D3) 50 mcg (2,000 unit) Cap Take 1 capsule by mouth Daily. Take vitamin D 2000 units every day.      diclofenac sodium (VOLTAREN) 1 % Gel Apply two grams to affected area 3-4 times as needed 1 each 3    LIDOcaine (LIDODERM) 5 % Place 1 patch onto the skin once daily. Remove & Discard patch within 12 hours or as directed by MD 30 patch 2    meloxicam (MOBIC) 15 MG tablet TAKE 1 TABLET BY MOUTH EVERY DAY 30 tablet 2    DULoxetine (CYMBALTA) 30 MG capsule Take 1 capsule (30 mg total) by mouth once daily. (Patient not taking: Reported on 6/19/2025) 30 capsule 2    gabapentin (NEURONTIN) 100 MG capsule Take 1 capsule (100 mg total) by mouth 3 (three) times  daily. 90 capsule 2    LIDOcaine (LIDODERM) 5 % Place 1 patch onto the skin once daily. Remove & Discard patch within 12 hours or as directed by MD 30 patch 2    loratadine (CLARITIN) 10 mg tablet Take 1 tablet (10 mg total) by mouth daily as needed for Allergies. 30 tablet 0    LORazepam (ATIVAN) 0.5 MG tablet Take 1 tablet (0.5 mg total) by mouth daily as needed for Anxiety. 30 tablet 3    meclizine (ANTIVERT) 12.5 mg tablet Take 1 tablet (12.5 mg total) by mouth 3 (three) times daily as needed for Dizziness or Nausea. 60 tablet 0     Current Facility-Administered Medications   Medication Dose Route Frequency Provider Last Rate Last Admin    perflutren protein-A microsphr 0.22 mg/mL IV susp  0.5 mL Intravenous Once Esdras Agudelo MD

## 2025-07-05 DIAGNOSIS — I10 ESSENTIAL HYPERTENSION: ICD-10-CM

## 2025-07-05 NOTE — TELEPHONE ENCOUNTER
Care Due:                  Date            Visit Type   Department     Provider  --------------------------------------------------------------------------------                                EP -                              PRIMARY      [facility] INTERNAL  Last Visit: 11-      CARE ([facility])   MEDICINE       Minoo Carrasquillo  Next Visit: None Scheduled  None         None Found                                                            Last  Test          Frequency    Reason                     Performed    Due Date  --------------------------------------------------------------------------------    CMP.........  12 months..  atorvastatin.............  08- 08-    Lipid Panel.  12 months..  atorvastatin.............  08- 08-    [employer] Embedded Care Due Messages. Reference number: [id number].   7/05/2025 12:15:33 PM CDT

## 2025-07-07 DIAGNOSIS — I10 ESSENTIAL HYPERTENSION: ICD-10-CM

## 2025-07-07 RX ORDER — AMLODIPINE BESYLATE 10 MG/1
10 TABLET ORAL
Qty: 90 TABLET | Refills: 1 | Status: SHIPPED | OUTPATIENT
Start: 2025-07-07

## 2025-07-07 NOTE — TELEPHONE ENCOUNTER
Refill Routing Note   Medication(s) are not appropriate for processing by [facility] for the following reason(s):        Required vitals abnormal    [facility] action(s):  Defer     Requires labs : Yes             Appointments  past 12m or future 3m with PCP    Date Provider   Last Visit   11/12/2024 Minoo Carrasquillo MD   Next Visit   Visit date not found Minoo Carrasquillo MD   ED visits in past 90 days: 0        Note composed:12:06 PM 07/07/2025

## 2025-07-07 NOTE — TELEPHONE ENCOUNTER
Please contact her to schedule her annual exam with me around November or December of this year, thank you

## 2025-07-08 RX ORDER — AMLODIPINE BESYLATE 10 MG/1
10 TABLET ORAL DAILY
Qty: 90 TABLET | Refills: 2 | OUTPATIENT
Start: 2025-07-08

## 2025-07-08 NOTE — TELEPHONE ENCOUNTER
Refill Decision Note  Quick DC. Request already responded to by other means (e.g. phone or fax)    Buffy Morin  is requesting a refill authorization.  Brief Assessment and Rationale for Refill:  Quick Discontinue     Medication Therapy Plan:       Medication Reconciliation Completed: No   Comments:           Note composed:8:42 AM 07/08/2025

## 2025-07-08 NOTE — TELEPHONE ENCOUNTER
No care due was identified.  [facility] Embedded Care Due Messages. Reference number: [id number].   7/08/2025 8:11:35 AM CDT

## 2025-07-08 NOTE — TELEPHONE ENCOUNTER
Copied from CRM #[id number]. Topic: Medications - Medication Refill  >> Jul 7, 2025 11:27 AM Jennifer wrote:  Requesting an RX refill or new RX.    Is this a refill or new RX: refill     RX name and strength (amLODIPine (NORVASC) 10 MG tablet      Is this a 30 day or 90 day RX: 90    Pharmacy name and phone #   [id number] - [place] - [address] AT [facility]  [address] [address]  Phone: [phone] Fax: [phone]       Who called and call back number: Patient phone [phone]    Patient need a call back     The doctors have asked that we provide their patients with the following 2 reminders -- prescription refills can take up to 72 hours, and a friendly reminder that in the future you can use your [facility] account to request refills:

## 2025-08-04 ENCOUNTER — PATIENT MESSAGE (OUTPATIENT)
Dept: ADMINISTRATIVE | Facility: HOSPITAL | Age: 65
End: 2025-08-04
Payer: COMMERCIAL

## 2025-08-07 DIAGNOSIS — M17.12 PRIMARY OSTEOARTHRITIS OF LEFT KNEE: ICD-10-CM

## 2025-08-10 RX ORDER — MELOXICAM 15 MG/1
15 TABLET ORAL
Qty: 30 TABLET | Refills: 2 | Status: SHIPPED | OUTPATIENT
Start: 2025-08-10

## 2025-08-20 ENCOUNTER — TELEPHONE (OUTPATIENT)
Dept: FAMILY MEDICINE | Facility: CLINIC | Age: 65
End: 2025-08-20
Payer: COMMERCIAL

## 2025-08-20 DIAGNOSIS — I10 ESSENTIAL HYPERTENSION: ICD-10-CM

## 2025-08-20 DIAGNOSIS — Z12.31 OTHER SCREENING MAMMOGRAM: ICD-10-CM

## 2025-08-25 DIAGNOSIS — Z00.00 ENCOUNTER FOR MEDICARE ANNUAL WELLNESS EXAM: ICD-10-CM

## 2025-08-28 ENCOUNTER — HOSPITAL ENCOUNTER (OUTPATIENT)
Dept: RADIOLOGY | Facility: HOSPITAL | Age: 65
Discharge: HOME | End: 2025-08-28
Attending: INTERNAL MEDICINE
Payer: MEDICARE

## 2025-08-28 DIAGNOSIS — Z12.31 OTHER SCREENING MAMMOGRAM: ICD-10-CM

## 2025-08-28 PROCEDURE — 77067 SCR MAMMO BI INCL CAD: CPT | Mod: TC

## 2025-08-29 ENCOUNTER — OFFICE VISIT (OUTPATIENT)
Dept: URGENT CARE | Facility: CLINIC | Age: 65
End: 2025-08-29
Payer: MEDICARE

## 2025-08-29 VITALS
HEART RATE: 77 BPM | BODY MASS INDEX: 27.66 KG/M2 | WEIGHT: 162 LBS | OXYGEN SATURATION: 97 % | SYSTOLIC BLOOD PRESSURE: 136 MMHG | HEIGHT: 64 IN | RESPIRATION RATE: 20 BRPM | DIASTOLIC BLOOD PRESSURE: 83 MMHG | TEMPERATURE: 99 F

## 2025-08-29 DIAGNOSIS — S29.012A STRAIN OF LATISSIMUS DORSI MUSCLE, INITIAL ENCOUNTER: Primary | ICD-10-CM

## 2025-08-29 RX ORDER — KETOROLAC TROMETHAMINE 30 MG/ML
30 INJECTION, SOLUTION INTRAMUSCULAR; INTRAVENOUS
Status: COMPLETED | OUTPATIENT
Start: 2025-08-29 | End: 2025-08-29

## 2025-08-29 RX ORDER — ESCITALOPRAM OXALATE 20 MG/1
20 TABLET ORAL
COMMUNITY
Start: 2025-08-05

## 2025-08-29 RX ORDER — ESCITALOPRAM OXALATE 20 MG/1
20 TABLET ORAL
COMMUNITY
Start: 2025-05-07

## 2025-08-29 RX ORDER — DEXAMETHASONE 4 MG/1
8 TABLET ORAL DAILY
Qty: 6 TABLET | Refills: 0 | Status: SHIPPED | OUTPATIENT
Start: 2025-08-29 | End: 2025-09-01

## 2025-08-29 RX ADMIN — KETOROLAC TROMETHAMINE 30 MG: 30 INJECTION, SOLUTION INTRAMUSCULAR; INTRAVENOUS at 09:08

## (undated) DEVICE — DRESSING LEUKOPLAST FLEX 1X3IN